# Patient Record
Sex: FEMALE | Race: OTHER | HISPANIC OR LATINO | Employment: FULL TIME | URBAN - METROPOLITAN AREA
[De-identification: names, ages, dates, MRNs, and addresses within clinical notes are randomized per-mention and may not be internally consistent; named-entity substitution may affect disease eponyms.]

---

## 2017-03-30 RX ORDER — ACETAMINOPHEN AND CODEINE PHOSPHATE 120; 12 MG/5ML; MG/5ML
1 SOLUTION ORAL DAILY
Qty: 28 TABLET | Refills: 0 | Status: SHIPPED | OUTPATIENT
Start: 2017-03-30 | End: 2017-04-19 | Stop reason: SDUPTHER

## 2017-03-30 NOTE — TELEPHONE ENCOUNTER
----- Message from Gabby Correa sent at 3/30/2017  1:46 PM CDT -----  Contact: [phone]  Patient called in requesting to speak with you. Did not specify why. Please advise.

## 2017-03-30 NOTE — TELEPHONE ENCOUNTER
Patient was originally scheduled for tomorrow morning but cycle came down. She only has one week of birth control left and would like to know if she can get one refill until she is seen 04/19 @10am.   Last GYN exam 02/15/16 (pap:WNL).  Patient was advised to make sure she keeps appt, if no shows or cancels no additional refills will be given. Patient verbalized understanding. Please advise.

## 2017-04-04 ENCOUNTER — PATIENT MESSAGE (OUTPATIENT)
Dept: OBSTETRICS AND GYNECOLOGY | Facility: CLINIC | Age: 32
End: 2017-04-04

## 2017-04-04 RX ORDER — NORETHINDRONE 0.35 MG
KIT ORAL
Qty: 28 TABLET | Refills: 11
Start: 2017-04-04

## 2017-04-05 ENCOUNTER — PATIENT MESSAGE (OUTPATIENT)
Dept: OBSTETRICS AND GYNECOLOGY | Facility: CLINIC | Age: 32
End: 2017-04-05

## 2017-04-05 NOTE — TELEPHONE ENCOUNTER
----- Message from Юлия Lake sent at 4/5/2017  2:25 PM CDT -----  [facility] called.   No. [phone]    Please call regarding Micronor.

## 2017-04-19 ENCOUNTER — OFFICE VISIT (OUTPATIENT)
Dept: OBSTETRICS AND GYNECOLOGY | Facility: CLINIC | Age: 32
End: 2017-04-19
Payer: COMMERCIAL

## 2017-04-19 VITALS
WEIGHT: 136.25 LBS | BODY MASS INDEX: 25.07 KG/M2 | HEIGHT: 62 IN | SYSTOLIC BLOOD PRESSURE: 120 MMHG | DIASTOLIC BLOOD PRESSURE: 66 MMHG

## 2017-04-19 DIAGNOSIS — Z01.419 WELL WOMAN EXAM WITH ROUTINE GYNECOLOGICAL EXAM: Primary | ICD-10-CM

## 2017-04-19 DIAGNOSIS — R10.9 FLANK PAIN: ICD-10-CM

## 2017-04-19 DIAGNOSIS — Z12.4 PAPANICOLAOU SMEAR FOR CERVICAL CANCER SCREENING: ICD-10-CM

## 2017-04-19 PROCEDURE — 99395 PREV VISIT EST AGE 18-39: CPT | Mod: S$GLB,,, | Performed by: OBSTETRICS & GYNECOLOGY

## 2017-04-19 PROCEDURE — 87086 URINE CULTURE/COLONY COUNT: CPT

## 2017-04-19 PROCEDURE — 88175 CYTOPATH C/V AUTO FLUID REDO: CPT

## 2017-04-19 PROCEDURE — 99999 PR PBB SHADOW E&M-EST. PATIENT-LVL II: CPT | Mod: PBBFAC,,, | Performed by: OBSTETRICS & GYNECOLOGY

## 2017-04-19 RX ORDER — ACETAMINOPHEN AND CODEINE PHOSPHATE 120; 12 MG/5ML; MG/5ML
1 SOLUTION ORAL DAILY
Qty: 28 TABLET | Refills: 10 | Status: SHIPPED | OUTPATIENT
Start: 2017-04-19 | End: 2017-04-22 | Stop reason: SDUPTHER

## 2017-04-19 NOTE — PROGRESS NOTES
"OBSTETRIC HISTORY:   OB History     This patient's OB History needs to be verified. Open OB History to review and resolve any issues.     Para Term  AB TAB SAB Ectopic Multiple Living    4 3 3 0 1 0 0 0 0 3         COMPREHENSIVE GYN HISTORY:  PAP History: Denies abnormal Paps.  Infection History: Denies STDs. Denies PID.  Benign History: Denies uterine fibroids. Denies ovarian cysts. Denies endometriosis.   Cancer History: Denies cervical cancer. Denies uterine cancer or hyperplasia. Denies ovarian cancer. Denies vulvar cancer or pre-cancer. Denies vaginal cancer or pre-cancer. Denies breast cancer. Denies colon cancer.  Sexual Activity History:   reports that she currently engages in sexual activity and has had male partners. She reports using the following methods of birth control/protection: None and OCP.   Menstrual History:  Every 28 days  Contraception: Mini pill      HPI:   31 y.o.  Patient's last menstrual period was 2017 (exact date).   Patient is  here for her annual gynecologic exam.  She has complaints of back pain, stomach pain, nausea and vomiting and temp 100.2. She denies bladder, breast and bowel complaints.    ROS:  GENERAL: Denies weight gain or weight loss. Feeling well overall.   SKIN: Denies rash or lesions.   HEAD: Denies headache.   NODES: Denies enlarged lymph nodes.   CHEST: Denies shortness of breath.   ABDOMEN: No  constipation, diarrhea, or rectal bleeding.   URINARY: No frequency, dysuria, hematuria, or burning on urination.  REPRODUCTIVE: See HPI.   BREASTS: The patient denies pain, lumps, or nipple discharge.   HEMATOLOGIC: No easy bruisability.   MUSCULOSKELETAL: Denies joint pain.   NEUROLOGIC: Denies weakness.   PSYCHIATRIC: Denies depression, anxiety or mood swings.    PE:   /66  Ht 5' 2" (1.575 m)  Wt 61.8 kg (136 lb 3.9 oz)  LMP 2017 (Exact Date)  BMI 24.92 kg/m2  APPEARANCE: Well nourished, well developed, in no acute distress.  NECK: Neck " symmetric without  thyromegaly.  NODES: No inguinal, cervical lymph node enlargement.  CHEST: Lungs clear to auscultation.  HEART: Regular rate and rhythm, no murmurs, rubs or gallops.  ABDOMEN: Soft. No tenderness or masses. No hernias. CVAT on the left  BREASTS: Symmetrical, no skin changes or visible lesions. No palpable masses, nipple discharge or adenopathy bilaterally.  PELVIC:   VULVA: No lesions. Normal female genitalia.  URETHRAL MEATUS: Normal size and location, no lesions, no prolapse.  URETHRA: No masses, tenderness, prolapse or scarring.  VAGINA: Moist and well rugated, no discharge, no significant cystocele or rectocele.  CERVIX: No lesions and discharge.  UTERUS: Normal size, regular shape, mobile, non-tender, bladder base nontender.  ADNEXA: No masses or tenderness.    PROCEDURES:  Pap smear  U/A: +blood    Assessment:  Normal Gynecologic Exam  Hematuria-- urine culture  Back pain-- maybe kidney stone, bladder infection or kidney infection--go to ED for any worsening   N/V-- declines anything for treatment but could be GI virus or above    Plan:  Mammogram and Colonoscopy if indicated by current recommendations.  Return to clinic in one year or for any problems or complaints.    Counseling:  Patient was counseled today on A.C.S. Pap guidelines and recommendations for yearly pelvic exams and monthly self breast exams; to see her PCP for other health maintenance. Regular exercise and healthy diet.

## 2017-04-21 LAB — BACTERIA UR CULT: NORMAL

## 2017-04-21 NOTE — TELEPHONE ENCOUNTER
----- Message from Aimee Forrest sent at 4/21/2017 10:37 AM CDT -----  Contact: Giselle Calling from [facility] - [phone]  Calling to talk to nurse concerning  norethindrone (MICRONOR) 0.35 mg tablet 28 tablet

## 2017-04-21 NOTE — TELEPHONE ENCOUNTER
Patient was recently seen 4/19/17 for annual.      Message left on secured pharmacy voice mailbox that ok to change Micronor 0.35 to 90 day with 3 additional refills.

## 2017-04-21 NOTE — TELEPHONE ENCOUNTER
----- Message from Luz Maria Durán sent at 4/21/2017 12:53 PM CDT -----  Contact: Giselle from [facility]/[phone]  Pharmacy said hey would like to see about a 90 day supply for patient  instead of 30 day supply. Please advise

## 2017-04-22 RX ORDER — ACETAMINOPHEN AND CODEINE PHOSPHATE 120; 12 MG/5ML; MG/5ML
1 SOLUTION ORAL DAILY
Qty: 84 TABLET | Refills: 3 | Status: SHIPPED | OUTPATIENT
Start: 2017-04-22 | End: 2017-06-15

## 2017-06-15 ENCOUNTER — LAB VISIT (OUTPATIENT)
Dept: LAB | Facility: HOSPITAL | Age: 32
End: 2017-06-15
Attending: OBSTETRICS & GYNECOLOGY
Payer: COMMERCIAL

## 2017-06-15 ENCOUNTER — INITIAL PRENATAL (OUTPATIENT)
Dept: OBSTETRICS AND GYNECOLOGY | Facility: CLINIC | Age: 32
End: 2017-06-15
Payer: COMMERCIAL

## 2017-06-15 VITALS
SYSTOLIC BLOOD PRESSURE: 100 MMHG | BODY MASS INDEX: 26.13 KG/M2 | WEIGHT: 142.88 LBS | DIASTOLIC BLOOD PRESSURE: 58 MMHG

## 2017-06-15 DIAGNOSIS — Z34.81 ENCOUNTER FOR SUPERVISION OF OTHER NORMAL PREGNANCY IN FIRST TRIMESTER: ICD-10-CM

## 2017-06-15 PROBLEM — Z34.91 ENCOUNTER FOR SUPERVISION OF NORMAL PREGNANCY IN FIRST TRIMESTER: Status: ACTIVE | Noted: 2017-06-15

## 2017-06-15 LAB
ABO + RH BLD: NORMAL
BLD GP AB SCN CELLS X3 SERPL QL: NORMAL
ERYTHROCYTE [DISTWIDTH] IN BLOOD BY AUTOMATED COUNT: 12.2 %
HCT VFR BLD AUTO: 35.5 %
HGB BLD-MCNC: 12.1 G/DL
MCH RBC QN AUTO: 31.8 PG
MCHC RBC AUTO-ENTMCNC: 34.1 %
MCV RBC AUTO: 93 FL
PLATELET # BLD AUTO: 265 K/UL
PMV BLD AUTO: 9.5 FL
RBC # BLD AUTO: 3.8 M/UL
WBC # BLD AUTO: 9.09 K/UL

## 2017-06-15 PROCEDURE — 87591 N.GONORRHOEAE DNA AMP PROB: CPT

## 2017-06-15 PROCEDURE — 85027 COMPLETE CBC AUTOMATED: CPT

## 2017-06-15 PROCEDURE — 86592 SYPHILIS TEST NON-TREP QUAL: CPT

## 2017-06-15 PROCEDURE — 86900 BLOOD TYPING SEROLOGIC ABO: CPT

## 2017-06-15 PROCEDURE — 86901 BLOOD TYPING SEROLOGIC RH(D): CPT

## 2017-06-15 PROCEDURE — 99999 PR PBB SHADOW E&M-EST. PATIENT-LVL II: CPT | Mod: PBBFAC,,, | Performed by: OBSTETRICS & GYNECOLOGY

## 2017-06-15 PROCEDURE — 86703 HIV-1/HIV-2 1 RESULT ANTBDY: CPT

## 2017-06-15 PROCEDURE — 87086 URINE CULTURE/COLONY COUNT: CPT

## 2017-06-15 PROCEDURE — 86762 RUBELLA ANTIBODY: CPT

## 2017-06-15 PROCEDURE — 36415 COLL VENOUS BLD VENIPUNCTURE: CPT

## 2017-06-15 PROCEDURE — 0500F INITIAL PRENATAL CARE VISIT: CPT | Mod: S$GLB,,, | Performed by: OBSTETRICS & GYNECOLOGY

## 2017-06-15 PROCEDURE — 87340 HEPATITIS B SURFACE AG IA: CPT

## 2017-06-15 NOTE — PROGRESS NOTES
OBSTETRIC HISTORY:   OB History     This patient's OB History needs to be verified. Open OB History to review and resolve any issues.     Para Term  AB Living    5 3 3 0 1 3    SAB TAB Ectopic Multiple Live Births    0 0 0 0 3             HPI:   31 y.o.  Patient's last menstrual period was 2017 (lmp unknown).   Patient is  here to confirm pregnancy and has a positive office UPT. She has some nausea.She denies bladder, breast and bowel complaints.    ROS:  GENERAL: Denies weight gain or weight loss. Feeling well overall.   SKIN: Denies rash or lesions.   HEAD: Denies headache.   NODES: Denies enlarged lymph nodes.   CHEST: Denies shortness of breath.   ABDOMEN: No abdominal pain, constipation, diarrhea, nausea, vomiting or rectal bleeding.   URINARY: No frequency, dysuria, hematuria, or burning on urination.  REPRODUCTIVE: See HPI.   BREASTS: The patient denies pain, lumps, or nipple discharge.   HEMATOLOGIC: No easy bruisability.   MUSCULOSKELETAL: Denies joint pain or back pain.   NEUROLOGIC: Denies weakness.   PSYCHIATRIC: Denies depression, anxiety or mood swings.    PE:   BP (!) 100/58   Wt 64.8 kg (142 lb 13.7 oz)   LMP 2017 (LMP Unknown)   BMI 26.13 kg/m²   APPEARANCE: Well nourished, well developed, in no acute distress.  ABDOMEN: Soft. No tenderness or masses. No hernias.  PELVIC:   VULVA: No lesions. Normal female genitalia.  URETHRAL MEATUS: Normal size and location, no lesions, no prolapse.  URETHRA: No masses, tenderness, prolapse or scarring.  VAGINA: Moist and well rugated, no discharge, no significant cystocele or rectocele.  CERVIX: No lesions and discharge.  UTERUS: Normal size, regular shape, mobile, non-tender, bladder base nontender.  ADNEXA: No masses or tenderness.    ASSESSMENT:  Pregnancy-- OB labs      PLAN:  RTO 4 weeks

## 2017-06-16 ENCOUNTER — PATIENT MESSAGE (OUTPATIENT)
Dept: OBSTETRICS AND GYNECOLOGY | Facility: CLINIC | Age: 32
End: 2017-06-16

## 2017-06-16 LAB
C TRACH DNA SPEC QL NAA+PROBE: NOT DETECTED
HBV SURFACE AG SERPL QL IA: NEGATIVE
HIV 1+2 AB+HIV1 P24 AG SERPL QL IA: NEGATIVE
N GONORRHOEA DNA SPEC QL NAA+PROBE: NOT DETECTED
RPR SER QL: NORMAL
RUBV IGG SER-ACNC: 180 IU/ML
RUBV IGG SER-IMP: REACTIVE

## 2017-06-17 LAB — BACTERIA UR CULT: NO GROWTH

## 2017-06-28 ENCOUNTER — ROUTINE PRENATAL (OUTPATIENT)
Dept: OBSTETRICS AND GYNECOLOGY | Facility: CLINIC | Age: 32
End: 2017-06-28
Payer: COMMERCIAL

## 2017-06-28 ENCOUNTER — TELEPHONE (OUTPATIENT)
Dept: OBSTETRICS AND GYNECOLOGY | Facility: CLINIC | Age: 32
End: 2017-06-28

## 2017-06-28 ENCOUNTER — OFFICE VISIT (OUTPATIENT)
Dept: OBSTETRICS AND GYNECOLOGY | Facility: CLINIC | Age: 32
End: 2017-06-28
Payer: COMMERCIAL

## 2017-06-28 VITALS
BODY MASS INDEX: 26.21 KG/M2 | SYSTOLIC BLOOD PRESSURE: 100 MMHG | DIASTOLIC BLOOD PRESSURE: 56 MMHG | WEIGHT: 143.31 LBS

## 2017-06-28 DIAGNOSIS — R30.0 DYSURIA: ICD-10-CM

## 2017-06-28 DIAGNOSIS — Z34.81 ENCOUNTER FOR SUPERVISION OF OTHER NORMAL PREGNANCY IN FIRST TRIMESTER: Primary | ICD-10-CM

## 2017-06-28 DIAGNOSIS — O20.0 THREATENED ABORTION: ICD-10-CM

## 2017-06-28 DIAGNOSIS — O20.0 THREATENED ABORTION: Primary | ICD-10-CM

## 2017-06-28 PROCEDURE — 87086 URINE CULTURE/COLONY COUNT: CPT

## 2017-06-28 PROCEDURE — 0502F SUBSEQUENT PRENATAL CARE: CPT | Mod: S$GLB,,, | Performed by: OBSTETRICS & GYNECOLOGY

## 2017-06-28 PROCEDURE — 81001 URINALYSIS AUTO W/SCOPE: CPT

## 2017-06-28 PROCEDURE — 76817 TRANSVAGINAL US OBSTETRIC: CPT | Mod: S$GLB,,, | Performed by: OBSTETRICS & GYNECOLOGY

## 2017-06-28 PROCEDURE — 99999 PR PBB SHADOW E&M-EST. PATIENT-LVL I: CPT | Mod: PBBFAC,,, | Performed by: OBSTETRICS & GYNECOLOGY

## 2017-06-28 RX ORDER — NITROFURANTOIN 25; 75 MG/1; MG/1
100 CAPSULE ORAL 2 TIMES DAILY
Qty: 14 CAPSULE | Refills: 0 | Status: SHIPPED | OUTPATIENT
Start: 2017-06-28 | End: 2017-07-05

## 2017-06-28 RX ORDER — ONDANSETRON 8 MG/1
8 TABLET, ORALLY DISINTEGRATING ORAL EVERY 8 HOURS PRN
Qty: 40 TABLET | Refills: 3 | Status: SHIPPED | OUTPATIENT
Start: 2017-06-28 | End: 2017-07-08

## 2017-06-28 NOTE — PROGRESS NOTES
Did not take temperature at home. Here it is at 99.0. She has left sided CVAT. Patient instructed bladder vs kidney infection and if starts running fever needs to be admitted to the hospital. Will start antibiotics while we wait for urine culture. Take Zofran 1 hour prior to antibiotic.

## 2017-06-28 NOTE — TELEPHONE ENCOUNTER
Patient contacted and scheduled for ultrasound at visit at 1400 6-28-17. Patient notified a clean catch urine needs to be given before her ultrasound. Patient verbalized understanding.

## 2017-06-28 NOTE — TELEPHONE ENCOUNTER
----- Message from Юлия Lake sent at 6/28/2017  9:12 AM CDT -----  No. [phone]   Patient is 8 weeks pregnant.  She has a UTI.   [facility] on [address]

## 2017-06-28 NOTE — PROGRESS NOTES
Complaining of dysuria, unable to void urine completely, left flank pain, headache, nausea, low back pain and chills

## 2017-06-28 NOTE — TELEPHONE ENCOUNTER
Returned patients call.   Patient is calling reporting L side pain and abdominal cramping, urinary urgency/frequency, urinary burning, feeling the urge to urinate but being unable to void when she attempts to, and drops of light pink/red blood present upon wiping that she believes is coming from her vagina. Patient states the symptoms began yesterday and have increased in severity today. Patient is negative for fever. Verbalized understanding. Notified the patient her message would be sent to Dr. Comer for review and the office would contact her with the physicians advisement. Patient verbalized understanding. Please advise.

## 2017-06-29 ENCOUNTER — PATIENT MESSAGE (OUTPATIENT)
Dept: OBSTETRICS AND GYNECOLOGY | Facility: CLINIC | Age: 32
End: 2017-06-29

## 2017-06-29 LAB
BACTERIA #/AREA URNS AUTO: NORMAL /HPF
BILIRUB UR QL STRIP: NEGATIVE
CLARITY UR REFRACT.AUTO: CLEAR
COLOR UR AUTO: COLORLESS
GLUCOSE UR QL STRIP: NEGATIVE
HGB UR QL STRIP: ABNORMAL
KETONES UR QL STRIP: NEGATIVE
LEUKOCYTE ESTERASE UR QL STRIP: NEGATIVE
MICROSCOPIC COMMENT: NORMAL
NITRITE UR QL STRIP: NEGATIVE
PH UR STRIP: 7 [PH] (ref 5–8)
PROT UR QL STRIP: NEGATIVE
RBC #/AREA URNS AUTO: 0 /HPF (ref 0–4)
SP GR UR STRIP: 1 (ref 1–1.03)
SQUAMOUS #/AREA URNS AUTO: 0 /HPF
URN SPEC COLLECT METH UR: ABNORMAL
UROBILINOGEN UR STRIP-ACNC: NEGATIVE EU/DL
WBC #/AREA URNS AUTO: 0 /HPF (ref 0–5)

## 2017-06-30 LAB — BACTERIA UR CULT: NORMAL

## 2017-07-13 ENCOUNTER — ROUTINE PRENATAL (OUTPATIENT)
Dept: OBSTETRICS AND GYNECOLOGY | Facility: CLINIC | Age: 32
End: 2017-07-13
Payer: COMMERCIAL

## 2017-07-13 VITALS
SYSTOLIC BLOOD PRESSURE: 110 MMHG | WEIGHT: 140.19 LBS | BODY MASS INDEX: 25.65 KG/M2 | DIASTOLIC BLOOD PRESSURE: 60 MMHG

## 2017-07-13 DIAGNOSIS — Z34.81 ENCOUNTER FOR SUPERVISION OF OTHER NORMAL PREGNANCY IN FIRST TRIMESTER: Primary | ICD-10-CM

## 2017-07-13 PROCEDURE — 99999 PR PBB SHADOW E&M-EST. PATIENT-LVL II: CPT | Mod: PBBFAC,,, | Performed by: OBSTETRICS & GYNECOLOGY

## 2017-07-13 PROCEDURE — 0502F SUBSEQUENT PRENATAL CARE: CPT | Mod: S$GLB,,, | Performed by: OBSTETRICS & GYNECOLOGY

## 2017-07-13 NOTE — PROGRESS NOTES
Complaining of constipation.  Has to take fiber gummies and stool softener then 5 hours later has a BM.  But the problem only repeats itself. Today is day two of not having a BM.    Patient wants to defer [facility]    Baby friendly ed given for weeks 9-12

## 2017-08-08 ENCOUNTER — INITIAL PRENATAL (OUTPATIENT)
Dept: OBSTETRICS AND GYNECOLOGY | Facility: CLINIC | Age: 32
End: 2017-08-08
Payer: COMMERCIAL

## 2017-08-08 ENCOUNTER — PATIENT MESSAGE (OUTPATIENT)
Dept: OBSTETRICS AND GYNECOLOGY | Facility: CLINIC | Age: 32
End: 2017-08-08

## 2017-08-08 VITALS
BODY MASS INDEX: 25.81 KG/M2 | WEIGHT: 141.13 LBS | DIASTOLIC BLOOD PRESSURE: 60 MMHG | SYSTOLIC BLOOD PRESSURE: 106 MMHG

## 2017-08-08 DIAGNOSIS — Z3A.13 13 WEEKS GESTATION OF PREGNANCY: ICD-10-CM

## 2017-08-08 DIAGNOSIS — O21.9 NAUSEA AND VOMITING DURING PREGNANCY: Primary | ICD-10-CM

## 2017-08-08 DIAGNOSIS — Z36.89 ENCOUNTER FOR FETAL ANATOMIC SURVEY: ICD-10-CM

## 2017-08-08 PROCEDURE — 99999 PR PBB SHADOW E&M-EST. PATIENT-LVL II: CPT | Mod: PBBFAC,,, | Performed by: OBSTETRICS & GYNECOLOGY

## 2017-08-08 PROCEDURE — 0502F SUBSEQUENT PRENATAL CARE: CPT | Mod: S$GLB,,, | Performed by: OBSTETRICS & GYNECOLOGY

## 2017-08-08 RX ORDER — DOXYLAMINE SUCCINATE AND PYRIDOXINE HYDROCHLORIDE, DELAYED RELEASE TABLETS 10 MG/10 MG 10; 10 MG/1; MG/1
2 TABLET, DELAYED RELEASE ORAL NIGHTLY
Qty: 120 TABLET | Refills: 1 | Status: SHIPPED | OUTPATIENT
Start: 2017-08-08 | End: 2017-09-12

## 2017-08-08 NOTE — PROGRESS NOTES
Wants to schedule C/S for 2/2/18  Has nausea.  Vomiting improving.  Will try diclegis  Declines genetic testing.

## 2017-09-12 ENCOUNTER — ROUTINE PRENATAL (OUTPATIENT)
Dept: OBSTETRICS AND GYNECOLOGY | Facility: CLINIC | Age: 32
End: 2017-09-12
Payer: COMMERCIAL

## 2017-09-12 VITALS
SYSTOLIC BLOOD PRESSURE: 106 MMHG | WEIGHT: 144.63 LBS | BODY MASS INDEX: 26.45 KG/M2 | DIASTOLIC BLOOD PRESSURE: 62 MMHG

## 2017-09-12 DIAGNOSIS — Z3A.18 18 WEEKS GESTATION OF PREGNANCY: Primary | ICD-10-CM

## 2017-09-12 DIAGNOSIS — Z34.82 ENCOUNTER FOR SUPERVISION OF OTHER NORMAL PREGNANCY, SECOND TRIMESTER: ICD-10-CM

## 2017-09-12 DIAGNOSIS — Z98.891 PREVIOUS CESAREAN SECTION: ICD-10-CM

## 2017-09-12 PROCEDURE — 0502F SUBSEQUENT PRENATAL CARE: CPT | Mod: S$GLB,,, | Performed by: OBSTETRICS & GYNECOLOGY

## 2017-09-12 PROCEDURE — 99999 PR PBB SHADOW E&M-EST. PATIENT-LVL II: CPT | Mod: PBBFAC,,, | Performed by: OBSTETRICS & GYNECOLOGY

## 2017-09-14 ENCOUNTER — OFFICE VISIT (OUTPATIENT)
Dept: MATERNAL FETAL MEDICINE | Facility: CLINIC | Age: 32
End: 2017-09-14
Attending: OBSTETRICS & GYNECOLOGY
Payer: COMMERCIAL

## 2017-09-14 DIAGNOSIS — Z36.89 ENCOUNTER FOR FETAL ANATOMIC SURVEY: ICD-10-CM

## 2017-09-14 PROCEDURE — 76805 OB US >/= 14 WKS SNGL FETUS: CPT | Mod: S$GLB,,, | Performed by: OBSTETRICS & GYNECOLOGY

## 2017-09-14 PROCEDURE — 99499 UNLISTED E&M SERVICE: CPT | Mod: S$GLB,,, | Performed by: OBSTETRICS & GYNECOLOGY

## 2017-09-28 ENCOUNTER — TELEPHONE (OUTPATIENT)
Dept: OBSTETRICS AND GYNECOLOGY | Facility: CLINIC | Age: 32
End: 2017-09-28

## 2017-09-28 ENCOUNTER — PATIENT MESSAGE (OUTPATIENT)
Dept: ADMINISTRATIVE | Facility: OTHER | Age: 32
End: 2017-09-28

## 2017-09-28 NOTE — TELEPHONE ENCOUNTER
Pt said she just went to  her connected moms packet and they told her to call the office to see if she needs to cancel her appt on 10/10. She will be 22 almost 23 weeks.

## 2017-09-28 NOTE — TELEPHONE ENCOUNTER
Advised pt that she will be 23 weeks at that visit and that she could be seen the following week instead. Informed her that the Connected Moms covers weeks 16, 20, and 35. So this week was her 20 week visit and she wouldn't have to return for 4 weeks. Pt verbalized understanding.

## 2017-09-29 ENCOUNTER — PATIENT OUTREACH (OUTPATIENT)
Dept: OTHER | Facility: OTHER | Age: 32
End: 2017-09-29

## 2017-09-29 NOTE — TELEPHONE ENCOUNTER
Called to introduce Patient to the Connected MOM program and also let her know her 20 week urine protein test is due. Was not able to leave voicemail (mailbox full), will try again Monday.

## 2017-10-04 NOTE — TELEPHONE ENCOUNTER
Initial introduction with Ms. Hillary SEGUNDO Александр completed and the role of the health coaches for Connected MOM was explained via [facility].     She is aware of the importance of taking weights and blood pressure readings.  She is aware that the health  can be used as a resource for physical activity and dietary habits.  Patient is aware to check [facility] account for messages containing information for weeks 20, 30, and 37 home urine tests.  She is aware that she should contact her OB for any specific questions regarding her pregnancy.   She is aware that she could contact [facility] on call or 911 in the case of a medical emergency.

## 2017-10-17 ENCOUNTER — ROUTINE PRENATAL (OUTPATIENT)
Dept: OBSTETRICS AND GYNECOLOGY | Facility: CLINIC | Age: 32
End: 2017-10-17
Payer: COMMERCIAL

## 2017-10-17 VITALS
BODY MASS INDEX: 28.02 KG/M2 | DIASTOLIC BLOOD PRESSURE: 62 MMHG | SYSTOLIC BLOOD PRESSURE: 118 MMHG | WEIGHT: 153.25 LBS

## 2017-10-17 DIAGNOSIS — Z3A.23 23 WEEKS GESTATION OF PREGNANCY: Primary | ICD-10-CM

## 2017-10-17 DIAGNOSIS — Z34.82 ENCOUNTER FOR SUPERVISION OF OTHER NORMAL PREGNANCY, SECOND TRIMESTER: ICD-10-CM

## 2017-10-17 PROCEDURE — 0502F SUBSEQUENT PRENATAL CARE: CPT | Mod: S$GLB,,, | Performed by: OBSTETRICS & GYNECOLOGY

## 2017-10-17 PROCEDURE — 99999 PR PBB SHADOW E&M-EST. PATIENT-LVL II: CPT | Mod: PBBFAC,,, | Performed by: OBSTETRICS & GYNECOLOGY

## 2017-11-14 ENCOUNTER — ROUTINE PRENATAL (OUTPATIENT)
Dept: OBSTETRICS AND GYNECOLOGY | Facility: CLINIC | Age: 32
End: 2017-11-14
Payer: COMMERCIAL

## 2017-11-14 ENCOUNTER — HOSPITAL ENCOUNTER (EMERGENCY)
Facility: OTHER | Age: 32
Discharge: HOME | End: 2017-11-14
Attending: OBSTETRICS & GYNECOLOGY
Payer: COMMERCIAL

## 2017-11-14 VITALS
SYSTOLIC BLOOD PRESSURE: 108 MMHG | OXYGEN SATURATION: 97 % | DIASTOLIC BLOOD PRESSURE: 59 MMHG | TEMPERATURE: 98 F | HEART RATE: 76 BPM

## 2017-11-14 VITALS
DIASTOLIC BLOOD PRESSURE: 60 MMHG | BODY MASS INDEX: 29.19 KG/M2 | WEIGHT: 159.63 LBS | SYSTOLIC BLOOD PRESSURE: 110 MMHG

## 2017-11-14 DIAGNOSIS — Z3A.27 27 WEEKS GESTATION OF PREGNANCY: ICD-10-CM

## 2017-11-14 DIAGNOSIS — O99.891 BACK PAIN AFFECTING PREGNANCY IN SECOND TRIMESTER: ICD-10-CM

## 2017-11-14 DIAGNOSIS — O23.40 UTI IN PREGNANCY, ANTEPARTUM: Primary | ICD-10-CM

## 2017-11-14 DIAGNOSIS — Z3A.27 27 WEEKS GESTATION OF PREGNANCY: Primary | ICD-10-CM

## 2017-11-14 DIAGNOSIS — O99.013 ANEMIA IN PREGNANCY, THIRD TRIMESTER: ICD-10-CM

## 2017-11-14 DIAGNOSIS — M54.9 BACK PAIN AFFECTING PREGNANCY IN SECOND TRIMESTER: ICD-10-CM

## 2017-11-14 DIAGNOSIS — Z34.83 ENCOUNTER FOR SUPERVISION OF OTHER NORMAL PREGNANCY, THIRD TRIMESTER: ICD-10-CM

## 2017-11-14 LAB
ALBUMIN SERPL BCP-MCNC: 2.9 G/DL
ALP SERPL-CCNC: 72 U/L
ALT SERPL W/O P-5'-P-CCNC: 13 U/L
ANION GAP SERPL CALC-SCNC: 7 MMOL/L
AST SERPL-CCNC: 19 U/L
BACTERIA #/AREA URNS HPF: NORMAL /HPF
BASOPHILS # BLD AUTO: 0.03 K/UL
BASOPHILS NFR BLD: 0.2 %
BILIRUB SERPL-MCNC: 0.3 MG/DL
BILIRUB UR QL STRIP: NEGATIVE
BUN SERPL-MCNC: 6 MG/DL
CALCIUM SERPL-MCNC: 9.2 MG/DL
CHLORIDE SERPL-SCNC: 106 MMOL/L
CLARITY UR: CLEAR
CO2 SERPL-SCNC: 24 MMOL/L
COLOR UR: YELLOW
CREAT SERPL-MCNC: 0.6 MG/DL
DIFFERENTIAL METHOD: ABNORMAL
EOSINOPHIL # BLD AUTO: 0.1 K/UL
EOSINOPHIL NFR BLD: 0.6 %
ERYTHROCYTE [DISTWIDTH] IN BLOOD BY AUTOMATED COUNT: 12.9 %
EST. GFR  (AFRICAN AMERICAN): >60 ML/MIN/1.73 M^2
EST. GFR  (NON AFRICAN AMERICAN): >60 ML/MIN/1.73 M^2
GLUCOSE SERPL-MCNC: 78 MG/DL
GLUCOSE UR QL STRIP: NEGATIVE
HCT VFR BLD AUTO: 31 %
HGB BLD-MCNC: 10.2 G/DL
HGB UR QL STRIP: ABNORMAL
KETONES UR QL STRIP: NEGATIVE
LEUKOCYTE ESTERASE UR QL STRIP: ABNORMAL
LYMPHOCYTES # BLD AUTO: 2 K/UL
LYMPHOCYTES NFR BLD: 16 %
MCH RBC QN AUTO: 30.9 PG
MCHC RBC AUTO-ENTMCNC: 32.9 G/DL
MCV RBC AUTO: 94 FL
MICROSCOPIC COMMENT: NORMAL
MONOCYTES # BLD AUTO: 0.9 K/UL
MONOCYTES NFR BLD: 7.1 %
NEUTROPHILS # BLD AUTO: 9.3 K/UL
NEUTROPHILS NFR BLD: 75.3 %
NITRITE UR QL STRIP: NEGATIVE
PH UR STRIP: 7 [PH] (ref 5–8)
PLATELET # BLD AUTO: 223 K/UL
PMV BLD AUTO: 10.1 FL
POTASSIUM SERPL-SCNC: 3.5 MMOL/L
PROT SERPL-MCNC: 6.9 G/DL
PROT UR QL STRIP: NEGATIVE
RBC # BLD AUTO: 3.3 M/UL
RBC #/AREA URNS HPF: 1 /HPF (ref 0–4)
SODIUM SERPL-SCNC: 137 MMOL/L
SP GR UR STRIP: <=1.005 (ref 1–1.03)
SQUAMOUS #/AREA URNS HPF: 1 /HPF
URN SPEC COLLECT METH UR: ABNORMAL
UROBILINOGEN UR STRIP-ACNC: NEGATIVE EU/DL
WBC # BLD AUTO: 12.32 K/UL
WBC #/AREA URNS HPF: 4 /HPF (ref 0–5)

## 2017-11-14 PROCEDURE — 81000 URINALYSIS NONAUTO W/SCOPE: CPT

## 2017-11-14 PROCEDURE — 99284 EMERGENCY DEPT VISIT MOD MDM: CPT | Mod: 25,,, | Performed by: OBSTETRICS & GYNECOLOGY

## 2017-11-14 PROCEDURE — 99999 PR PBB SHADOW E&M-EST. PATIENT-LVL II: CPT | Mod: PBBFAC,,, | Performed by: OBSTETRICS & GYNECOLOGY

## 2017-11-14 PROCEDURE — 80053 COMPREHEN METABOLIC PANEL: CPT

## 2017-11-14 PROCEDURE — 99284 EMERGENCY DEPT VISIT MOD MDM: CPT | Mod: 25,27

## 2017-11-14 PROCEDURE — 87077 CULTURE AEROBIC IDENTIFY: CPT

## 2017-11-14 PROCEDURE — 87086 URINE CULTURE/COLONY COUNT: CPT

## 2017-11-14 PROCEDURE — 99212 OFFICE O/P EST SF 10 MIN: CPT | Mod: PBBFAC,PN,25 | Performed by: OBSTETRICS & GYNECOLOGY

## 2017-11-14 PROCEDURE — 87088 URINE BACTERIA CULTURE: CPT

## 2017-11-14 PROCEDURE — 85025 COMPLETE CBC W/AUTO DIFF WBC: CPT

## 2017-11-14 PROCEDURE — 87186 SC STD MICRODIL/AGAR DIL: CPT

## 2017-11-14 PROCEDURE — 0502F SUBSEQUENT PRENATAL CARE: CPT | Mod: S$GLB,,, | Performed by: OBSTETRICS & GYNECOLOGY

## 2017-11-14 PROCEDURE — 59025 FETAL NON-STRESS TEST: CPT

## 2017-11-14 PROCEDURE — 59025 FETAL NON-STRESS TEST: CPT | Mod: 26,,, | Performed by: OBSTETRICS & GYNECOLOGY

## 2017-11-14 RX ORDER — NITROFURANTOIN 25; 75 MG/1; MG/1
100 CAPSULE ORAL 2 TIMES DAILY
Qty: 14 CAPSULE | Refills: 0 | Status: SHIPPED | OUTPATIENT
Start: 2017-11-14 | End: 2017-11-21

## 2017-11-14 NOTE — ED TRIAGE NOTES
Patient from clinic following abnormal urine sample and flank pain.  Patient states + kidney infections in the past

## 2017-11-14 NOTE — PROGRESS NOTES
Patient has a positive urine dip and mild left CVA tenderness  She has had kidney stones when not pregnant and pyelonephritis with and without pregnancy.  She was delivered at 37 weeks last pregnancy due to pyelonephritis.  She has no vomiting and mild nausea yesterday.  She denies fever.  To DAVID to w/u for possible pyelonephritis.  Will need 7 mth labs and Tdap at next visit

## 2017-11-14 NOTE — DISCHARGE INSTRUCTIONS
Call clinic [phone] or L & D after hours at [phone] for vaginal bleeding, leakage of fluids, contractions 4-5 in one hour, decreased fetal movements ( 10 kicks in 2 hours), headache not relieved by Tylenol, blurry vision, or temp of 100.4 or greater.  Begin doing fetal kick counts, at least 10 movements in 2 hours starting at 28 weeks gestation.  Keep next clinic appointment  Take all antibiotics as prescribed.

## 2017-11-14 NOTE — PROGRESS NOTES
Per MD, patient ok to dc home.  Patient given discharge instructions, verbalized understanding.  IV discontinued without difficulty.

## 2017-11-14 NOTE — ED PROVIDER NOTES
Encounter Date: 2017       History     Chief Complaint   Patient presents with    Urinary Tract Infection     vs pylo? sent from MD office     31 yo  @ 27  with left flank pain & nausea & possible UTI. Patient was sent from Dr Salmeron's office today for concern for pyelonephritis vs UTI vs nephrolithiasis.  Patient has a history of kidney stones; but none for a while.     She has 3  deliveries, including 1 delivery at 37 wga.  Her children at home have been sick with Upper respiratory infection.           Review of patient's allergies indicates:  No Known Allergies  Past Medical History:   Diagnosis Date    Kidney infection     kidney stones aand kidney infection 2012/and 13     Past Surgical History:   Procedure Laterality Date    BREAST SURGERY      augmentation     SECTION  2004     SECTION  2010     SECTION  2015    CHOLECYSTECTOMY      2012    DILATION AND CURETTAGE OF UTERUS      TAB     Family History   Problem Relation Age of Onset    Diabetes Maternal Grandfather     Diabetes Father     No Known Problems Mother     Diabetes Paternal Grandfather     Dementia Paternal Grandmother     No Known Problems Maternal Grandmother     No Known Problems Sister     No Known Problems Sister     Breast cancer Neg Hx     Colon cancer Neg Hx     Ovarian cancer Neg Hx     Hypertension Neg Hx     Stroke Neg Hx      Social History   Substance Use Topics    Smoking status: Never Smoker    Smokeless tobacco: Never Used    Alcohol use No     Review of Systems   Constitutional: Positive for fatigue. Negative for activity change, appetite change, chills and fever.   HENT: Negative.    Eyes: Negative.    Respiratory: Negative.    Cardiovascular: Negative.    Gastrointestinal: Positive for nausea. Negative for abdominal pain, constipation, diarrhea and vomiting.   Genitourinary: Positive for flank pain and urgency. Negative for decreased urine  volume, difficulty urinating, dyspareunia, enuresis, vaginal bleeding, vaginal discharge and vaginal pain.   Musculoskeletal: Positive for back pain. Negative for myalgias and neck pain.        Sharp shooting back pain off and on & some dull aching   Skin: Negative.    Neurological: Positive for light-headedness. Negative for dizziness, syncope and numbness.   Hematological: Negative.    Psychiatric/Behavioral: Negative.        Physical Exam     Initial Vitals [11/14/17 1233]   BP Pulse Resp Temp SpO2   (!) 108/59 76 -- 98.4 °F (36.9 °C) 97 %      MAP       75.33         Physical Exam    Vitals reviewed.  Constitutional: She appears well-developed and well-nourished. She is not diaphoretic. No distress.   HENT:   Head: Normocephalic and atraumatic.   Nose: Nose normal.   Mouth/Throat: Oropharynx is clear and moist.   Eyes: Conjunctivae are normal.   Neck: Neck supple.   Cardiovascular: Normal rate, regular rhythm, normal heart sounds and intact distal pulses.   Pulmonary/Chest: Breath sounds normal. No respiratory distress. She has no wheezes. She has no rales.   Abdominal: Soft. Bowel sounds are normal. There is tenderness. There is no rebound.   + suprapubic tenderness; mild tenderness left flank possible CVAT; no masses; no SI joint tenderness   Musculoskeletal: She exhibits no edema or tenderness.   Lymphadenopathy:     She has no cervical adenopathy.   Neurological: She is alert and oriented to person, place, and time. She has normal strength and normal reflexes.   Skin: Skin is warm and dry. Capillary refill takes less than 2 seconds.   Psychiatric: She has a normal mood and affect. Her behavior is normal. Judgment and thought content normal.         ED Course   Obtain Fetal nonstress test (NST)  Date/Time: 11/14/2017 1:41 PM  Performed by: DANIEL CARRASCO  Authorized by: KERRIE CHENG     Nonstress Test:     Variability:  6-25 BPM    Decelerations:  None    Accelerations:  10 bpm    Acoustic  Stimulator: No      Baseline:  140    Uterine Irritability: No      Contractions:  Not present  Biophysical Profile:     Nonstress Test Interpretation: appropriate for gestational age      Overall Impression:  Reassuring  Post-procedure:     Patient tolerance:  Patient tolerated the procedure well with no immediate complications      Labs Reviewed   CBC W/ AUTO DIFFERENTIAL - Abnormal; Notable for the following:        Result Value    RBC 3.30 (*)     Hemoglobin 10.2 (*)     Hematocrit 31.0 (*)     Gran # 9.3 (*)     Gran% 75.3 (*)     Lymph% 16.0 (*)     All other components within normal limits   COMPREHENSIVE METABOLIC PANEL - Abnormal; Notable for the following:     Albumin 2.9 (*)     Anion Gap 7 (*)     All other components within normal limits   CULTURE, URINE   URINALYSIS             Medical Decision Making:   History:   Old Medical Records: I decided to obtain old medical records.  Old Records Summarized: records from clinic visits and records from previous admission(s).       <> Summary of Records: Prenatal records reviewed. U/A in office +leuk & +nitrites; culture pending.  Initial Assessment:   UTI in pregnancy  Clinical Tests:   Lab Tests: Ordered and Reviewed  The following lab test(s) were unremarkable: CBC, CMP and Urinalysis       <> Summary of Lab: Stable anemia  Radiological Study: Ordered and Reviewed  ED Management:  Patient evaluated for pyelonephritis and nephrolithiasis. Findings negative except for UTI.  Cultures pending. Patient non-toxic appearing. Fetal heart rate tracing reassuring.   - Renal ultrasound negative/normal.                    ED Course      Clinical Impression:   The primary encounter diagnosis was UTI in pregnancy, antepartum. Diagnoses of 27 weeks gestation of pregnancy, Anemia in pregnancy, third trimester, and Back pain affecting pregnancy in second trimester were also pertinent to this visit.                           Uzma Gardner MD  11/14/17 5897

## 2017-11-16 LAB — BACTERIA UR CULT: NORMAL

## 2017-11-21 ENCOUNTER — ROUTINE PRENATAL (OUTPATIENT)
Dept: OBSTETRICS AND GYNECOLOGY | Facility: CLINIC | Age: 32
End: 2017-11-21
Payer: COMMERCIAL

## 2017-11-21 ENCOUNTER — CLINICAL SUPPORT (OUTPATIENT)
Dept: OBSTETRICS AND GYNECOLOGY | Facility: CLINIC | Age: 32
End: 2017-11-21
Payer: COMMERCIAL

## 2017-11-21 VITALS
BODY MASS INDEX: 29.84 KG/M2 | WEIGHT: 163.13 LBS | SYSTOLIC BLOOD PRESSURE: 120 MMHG | DIASTOLIC BLOOD PRESSURE: 70 MMHG

## 2017-11-21 DIAGNOSIS — Z34.83 ENCOUNTER FOR SUPERVISION OF OTHER NORMAL PREGNANCY, THIRD TRIMESTER: ICD-10-CM

## 2017-11-21 DIAGNOSIS — Z23 NEED FOR TDAP VACCINATION: Primary | ICD-10-CM

## 2017-11-21 DIAGNOSIS — O23.40 URINARY TRACT INFECTION IN MOTHER DURING PREGNANCY, ANTEPARTUM: ICD-10-CM

## 2017-11-21 DIAGNOSIS — Z3A.28 28 WEEKS GESTATION OF PREGNANCY: Primary | ICD-10-CM

## 2017-11-21 PROCEDURE — 99999 PR PBB SHADOW E&M-EST. PATIENT-LVL II: CPT | Mod: PBBFAC,,, | Performed by: OBSTETRICS & GYNECOLOGY

## 2017-11-21 PROCEDURE — 90472 IMMUNIZATION ADMIN EACH ADD: CPT | Mod: PBBFAC,PN

## 2017-11-21 PROCEDURE — 99212 OFFICE O/P EST SF 10 MIN: CPT | Mod: PBBFAC,27,PN,25 | Performed by: OBSTETRICS & GYNECOLOGY

## 2017-11-21 PROCEDURE — 0502F SUBSEQUENT PRENATAL CARE: CPT | Mod: S$GLB,,, | Performed by: OBSTETRICS & GYNECOLOGY

## 2017-11-21 PROCEDURE — 90471 IMMUNIZATION ADMIN: CPT | Mod: PBBFAC,PN

## 2017-11-21 PROCEDURE — 87086 URINE CULTURE/COLONY COUNT: CPT

## 2017-11-21 PROCEDURE — 99211 OFF/OP EST MAY X REQ PHY/QHP: CPT | Mod: PBBFAC,PN

## 2017-11-21 PROCEDURE — 99999 PR PBB SHADOW E&M-EST. PATIENT-LVL I: CPT | Mod: PBBFAC,,,

## 2017-11-21 PROCEDURE — 90715 TDAP VACCINE 7 YRS/> IM: CPT | Mod: PBBFAC,PN

## 2017-11-21 NOTE — PROGRESS NOTES
Educational flyer reviewed the Tdap vaccination reviewed and provided to pt. Pt received Tdap injection as ordered by Dr Salmeron to left deltoid, pt toelrated well. Pt denies pain. Pt instructed and observed for 15 min post injection. No reaction noted.

## 2017-11-21 NOTE — PROGRESS NOTES
+ucx 11/14/17 - treated with Macrobid  Repeat urine cx today. Finished meds yesterday.  No back pian.  No N/V  Tdap and flu today  Prev C/S x 3

## 2017-11-22 LAB — BACTERIA UR CULT: NO GROWTH

## 2017-11-28 ENCOUNTER — LAB VISIT (OUTPATIENT)
Dept: LAB | Facility: HOSPITAL | Age: 32
End: 2017-11-28
Attending: OBSTETRICS & GYNECOLOGY
Payer: COMMERCIAL

## 2017-11-28 DIAGNOSIS — Z3A.27 27 WEEKS GESTATION OF PREGNANCY: ICD-10-CM

## 2017-11-28 LAB
BASOPHILS # BLD AUTO: 0.06 K/UL
BASOPHILS NFR BLD: 0.5 %
DIFFERENTIAL METHOD: ABNORMAL
EOSINOPHIL # BLD AUTO: 0.1 K/UL
EOSINOPHIL NFR BLD: 0.7 %
ERYTHROCYTE [DISTWIDTH] IN BLOOD BY AUTOMATED COUNT: 13 %
GLUCOSE SERPL-MCNC: 151 MG/DL
HCT VFR BLD AUTO: 28.7 %
HGB BLD-MCNC: 9.3 G/DL
IMM GRANULOCYTES # BLD AUTO: 0.19 K/UL
IMM GRANULOCYTES NFR BLD AUTO: 1.6 %
LYMPHOCYTES # BLD AUTO: 1.6 K/UL
LYMPHOCYTES NFR BLD: 13.4 %
MCH RBC QN AUTO: 30.5 PG
MCHC RBC AUTO-ENTMCNC: 32.4 G/DL
MCV RBC AUTO: 94 FL
MONOCYTES # BLD AUTO: 0.8 K/UL
MONOCYTES NFR BLD: 6.9 %
NEUTROPHILS # BLD AUTO: 9.1 K/UL
NEUTROPHILS NFR BLD: 76.9 %
NRBC BLD-RTO: 0 /100 WBC
PLATELET # BLD AUTO: 220 K/UL
PMV BLD AUTO: 10.3 FL
RBC # BLD AUTO: 3.05 M/UL
WBC # BLD AUTO: 11.81 K/UL

## 2017-11-28 PROCEDURE — 85025 COMPLETE CBC W/AUTO DIFF WBC: CPT

## 2017-11-28 PROCEDURE — 82950 GLUCOSE TEST: CPT

## 2017-11-29 ENCOUNTER — TELEPHONE (OUTPATIENT)
Dept: OBSTETRICS AND GYNECOLOGY | Facility: CLINIC | Age: 32
End: 2017-11-29

## 2017-11-29 DIAGNOSIS — R73.09 ABNORMAL GLUCOSE TOLERANCE TEST (GTT): Primary | ICD-10-CM

## 2017-11-29 NOTE — TELEPHONE ENCOUNTER
----- Message from Tanisha Salmeron MD sent at 11/29/2017 11:58 AM CST -----  Please call pt.  She has anemia so please take fergon over the counter daily.  Also elevated 1hgtt so needs to schedule 3hgtt fasting x 8 hrs

## 2017-12-01 ENCOUNTER — PATIENT MESSAGE (OUTPATIENT)
Dept: ADMINISTRATIVE | Facility: OTHER | Age: 32
End: 2017-12-01

## 2017-12-08 ENCOUNTER — LAB VISIT (OUTPATIENT)
Dept: LAB | Facility: HOSPITAL | Age: 32
End: 2017-12-08
Attending: OBSTETRICS & GYNECOLOGY
Payer: COMMERCIAL

## 2017-12-08 DIAGNOSIS — R73.09 ABNORMAL GLUCOSE TOLERANCE TEST (GTT): ICD-10-CM

## 2017-12-08 LAB
GLUCOSE SERPL-MCNC: 125 MG/DL
GLUCOSE SERPL-MCNC: 133 MG/DL
GLUCOSE SERPL-MCNC: 90 MG/DL
GLUCOSE SERPL-MCNC: 98 MG/DL

## 2017-12-08 PROCEDURE — 82951 GLUCOSE TOLERANCE TEST (GTT): CPT

## 2017-12-15 ENCOUNTER — TELEPHONE (OUTPATIENT)
Dept: OBSTETRICS AND GYNECOLOGY | Facility: CLINIC | Age: 32
End: 2017-12-15

## 2017-12-19 ENCOUNTER — ROUTINE PRENATAL (OUTPATIENT)
Dept: OBSTETRICS AND GYNECOLOGY | Facility: CLINIC | Age: 32
End: 2017-12-19
Payer: COMMERCIAL

## 2017-12-19 ENCOUNTER — CLINICAL SUPPORT (OUTPATIENT)
Dept: OBSTETRICS AND GYNECOLOGY | Facility: CLINIC | Age: 32
End: 2017-12-19
Payer: COMMERCIAL

## 2017-12-19 VITALS
WEIGHT: 164.25 LBS | SYSTOLIC BLOOD PRESSURE: 128 MMHG | DIASTOLIC BLOOD PRESSURE: 76 MMHG | BODY MASS INDEX: 30.04 KG/M2

## 2017-12-19 DIAGNOSIS — O36.8130 DECREASED FETAL MOVEMENTS IN THIRD TRIMESTER, SINGLE OR UNSPECIFIED FETUS: Primary | ICD-10-CM

## 2017-12-19 DIAGNOSIS — Z34.83 ENCOUNTER FOR SUPERVISION OF OTHER NORMAL PREGNANCY, THIRD TRIMESTER: ICD-10-CM

## 2017-12-19 DIAGNOSIS — Z3A.32 32 WEEKS GESTATION OF PREGNANCY: Primary | ICD-10-CM

## 2017-12-19 PROCEDURE — 99999 PR PBB SHADOW E&M-EST. PATIENT-LVL II: CPT | Mod: PBBFAC,,, | Performed by: OBSTETRICS & GYNECOLOGY

## 2017-12-19 PROCEDURE — 0502F SUBSEQUENT PRENATAL CARE: CPT | Mod: S$GLB,,, | Performed by: OBSTETRICS & GYNECOLOGY

## 2017-12-19 PROCEDURE — 59025 FETAL NON-STRESS TEST: CPT | Mod: S$GLB,,, | Performed by: OBSTETRICS & GYNECOLOGY

## 2017-12-19 NOTE — PROCEDURES
Procedures    FETAL ASSESSMENT REPORT    RE: Hillary Fountain  MRN:  [id number]  :  1985  AGE:  32 y.o.    Date:  2017    Physician:  Dr. Tanisha Salmeron    Allergies: Patient has no known allergies.    Hillary is a 32 y.o.  at 32w5d gestational age here today for a NST.    2018, by Last Menstrual Period    MEDICATIONS AT TIME OF TEST:  Current Outpatient Prescriptions   Medication Sig Dispense Refill    PEDIATRIC MULTIVIT COMB #19/FA (CHILDREN'S MULTI-VIT GUMMIES ORAL) Take 1 tablet by mouth once daily.       No current facility-administered medications for this visit.        Indication: decreased fetal movement    Interpretation:  140 BPM baseline    Variability:  Good {> 6 bpm)    Accelerations:  Reactive    Decelerations:  none    Assessment: reactive    Plan:  NST reactive      Tanisha Salmeron MD  2017; 12:11 PM

## 2017-12-19 NOTE — PROGRESS NOTES
She complains of carpal tunnel on right side.  Normal  strength.  Also right sided sciatica.  Still has normal mobility. Position change and stretching recommended.  She complains of decreased fetal movement.  NST reactive

## 2018-01-02 ENCOUNTER — ROUTINE PRENATAL (OUTPATIENT)
Dept: OBSTETRICS AND GYNECOLOGY | Facility: CLINIC | Age: 33
End: 2018-01-02
Payer: COMMERCIAL

## 2018-01-02 VITALS
DIASTOLIC BLOOD PRESSURE: 82 MMHG | BODY MASS INDEX: 31.05 KG/M2 | SYSTOLIC BLOOD PRESSURE: 130 MMHG | WEIGHT: 169.75 LBS

## 2018-01-02 DIAGNOSIS — Z34.83 ENCOUNTER FOR SUPERVISION OF OTHER NORMAL PREGNANCY, THIRD TRIMESTER: ICD-10-CM

## 2018-01-02 DIAGNOSIS — Z3A.34 34 WEEKS GESTATION OF PREGNANCY: Primary | ICD-10-CM

## 2018-01-02 PROCEDURE — 0502F SUBSEQUENT PRENATAL CARE: CPT | Mod: S$GLB,,, | Performed by: OBSTETRICS & GYNECOLOGY

## 2018-01-02 PROCEDURE — 99999 PR PBB SHADOW E&M-EST. PATIENT-LVL II: CPT | Mod: PBBFAC,,, | Performed by: OBSTETRICS & GYNECOLOGY

## 2018-01-09 ENCOUNTER — ROUTINE PRENATAL (OUTPATIENT)
Dept: OBSTETRICS AND GYNECOLOGY | Facility: CLINIC | Age: 33
End: 2018-01-09
Payer: COMMERCIAL

## 2018-01-09 VITALS
DIASTOLIC BLOOD PRESSURE: 66 MMHG | BODY MASS INDEX: 30.67 KG/M2 | WEIGHT: 167.69 LBS | SYSTOLIC BLOOD PRESSURE: 114 MMHG

## 2018-01-09 DIAGNOSIS — Z34.83 ENCOUNTER FOR SUPERVISION OF OTHER NORMAL PREGNANCY, THIRD TRIMESTER: ICD-10-CM

## 2018-01-09 DIAGNOSIS — Z3A.35 35 WEEKS GESTATION OF PREGNANCY: Primary | ICD-10-CM

## 2018-01-09 PROCEDURE — 0502F SUBSEQUENT PRENATAL CARE: CPT | Mod: S$GLB,,, | Performed by: OBSTETRICS & GYNECOLOGY

## 2018-01-09 PROCEDURE — 87081 CULTURE SCREEN ONLY: CPT

## 2018-01-09 PROCEDURE — 99999 PR PBB SHADOW E&M-EST. PATIENT-LVL III: CPT | Mod: PBBFAC,,, | Performed by: OBSTETRICS & GYNECOLOGY

## 2018-01-09 RX ORDER — SODIUM CHLORIDE, SODIUM LACTATE, POTASSIUM CHLORIDE, CALCIUM CHLORIDE 600; 310; 30; 20 MG/100ML; MG/100ML; MG/100ML; MG/100ML
INJECTION, SOLUTION INTRAVENOUS CONTINUOUS
Status: CANCELLED | OUTPATIENT
Start: 2018-01-09

## 2018-01-09 RX ORDER — SODIUM CITRATE AND CITRIC ACID MONOHYDRATE 334; 500 MG/5ML; MG/5ML
30 SOLUTION ORAL
Status: CANCELLED | OUTPATIENT
Start: 2018-01-09

## 2018-01-09 RX ORDER — MISOPROSTOL 100 UG/1
800 TABLET ORAL
Status: CANCELLED | OUTPATIENT
Start: 2018-01-09

## 2018-01-09 RX ORDER — MUPIROCIN 20 MG/G
OINTMENT TOPICAL
Status: CANCELLED | OUTPATIENT
Start: 2018-01-09

## 2018-01-09 NOTE — PROGRESS NOTES
She complains of bilateral carpal tunnel.  Has brace.  Discussed elevation  and brace.  BP normal  No other complaints

## 2018-01-09 NOTE — H&P
History and Physical       Obstetrics - Planned           Subjective:       Hillary Fountain is a 32 y.o.  female with IUP at 39w0d weeks gestation on 18 who presents for scheduled  section due to previous  delivery x 3.    Patient denies contractions, denies vaginal bleeding, denies LOF.   Fetal Movement: normal.     PMHx:   Past Medical History:   Diagnosis Date    Kidney infection     kidney stones aand kidney infection 2012/and 13       PSHx:   Past Surgical History:   Procedure Laterality Date    BREAST SURGERY      augmentation     SECTION  2004     SECTION  2010     SECTION  2015    CHOLECYSTECTOMY      2012    DILATION AND CURETTAGE OF UTERUS  2005    TAB       All: Review of patient's allergies indicates:  No Known Allergies    Meds:   (Not in a hospital admission)    SH:   Social History     Social History    Marital status:      Spouse name: N/A    Number of children: N/A    Years of education: N/A     Occupational History    Not on file.     Social History Main Topics    Smoking status: Never Smoker    Smokeless tobacco: Never Used    Alcohol use No    Drug use: No    Sexual activity: Yes     Partners: Male     Birth control/ protection: None     Other Topics Concern    Not on file     Social History Narrative    No narrative on file       FH:   Family History   Problem Relation Age of Onset    Diabetes Maternal Grandfather     Diabetes Father     No Known Problems Mother     Diabetes Paternal Grandfather     Dementia Paternal Grandmother     No Known Problems Maternal Grandmother     No Known Problems Sister     No Known Problems Sister     Breast cancer Neg Hx     Colon cancer Neg Hx     Ovarian cancer Neg Hx     Hypertension Neg Hx     Stroke Neg Hx        OBHx:   Obstetric History       T3      L3     SAB0   TAB0   Ectopic0   Multiple0   Live Births3       # Outcome  Date GA Lbr Tex/2nd Weight Sex Delivery Anes PTL Lv   5 Current            4 Term 01/09/15 39w3d  3.502 kg (7 lb 11.5 oz) M CS-LTranv Spinal N FABIAN      Apgar1:  9                Apgar5: 9   3 Term 03/05/10 38w0d  2.977 kg (6 lb 9 oz) M CS-LTranv Spinal  FABIAN      Name: Dana   2 AB 2005           1 Term 08/18/04 37w0d  3.544 kg (7 lb 13 oz) M CS-LTranv EPI  FABIAN      Name: Joce      Complications: Fetal Intolerance      Obstetric Comments   Age at menarche 14       Objective:       /66   Wt 76.1 kg (167 lb 10.6 oz)   LMP 05/04/2017 (LMP Unknown)   BMI 30.67 kg/m²     [unfilled]    General:   alert, appears stated age and cooperative   Lungs:   clear to auscultation bilaterally   Heart:   regular rate and rhythm, S1, S2 normal, no murmur, click, rub or gallop   Abdomen:  soft, non-tender; bowel sounds normal; no masses,  no organomegaly   Extremities negative edema, negative erythema     Cervical exam: closed    Fetal Heart Tones: 150    Lab Review:  Blood Type A POS       1st Trimester (0-14 wks)     Test Value Date Time   ABORH - Soft  A POS  06/15/17 1545   ABSC  NEG  06/15/17 1545   HGB  12.1 g/dL 06/15/17 1545   HCT  35.5 % (L) 06/15/17 1545   MCV  93 fL 06/15/17 1545   Platelet Count  265 K/uL 06/15/17 1545   Rubella Immune Status  Reactive  06/15/17 1545   RPR  Non-reactive  06/15/17 1545   HBsAg  Negative  06/15/17 1545   HIV 1/2 Ab - Soft  Negative  06/15/17 1545   HIV 1/2 Ab      Chlamydia, Amplified  Not Detected  06/15/17 1534   GC, Amplified  Not Detected  06/15/17 1534   PAP      Hemoglobin Bands      Urine Culture  No growth  11/21/17 0927   Glucose - Random  78 mg/dL 11/14/17 1310   TSH      2nd Trimester (14-28 wks)     Test Value Date Time   ABSC      HGB  10.2 g/dL (L) 11/14/17 1310   HCT  31.0 % (L) 11/14/17 1310   MCV  94 fL 11/14/17 1310   Platelet Count  223 K/uL 11/14/17 1310   OB Glucose Screen  151 mg/dL (H) 11/28/17 1103   Gluc Fast  90 mg/dL 12/08/17 0830   Gluc 1 HR  133 mg/dL  17 0830   Gluc 2 HR  125 mg/dL 1730   Gluc 3 HR  98 mg/dL 17    3rd Trimester (28-40 wks)     Test Value Date Time   HGB  9.3 g/dL (L) 17 0959   HCT  28.7 % (L) 17 0959   MCV  94 fL 17 0959   Platelet Count  220 K/uL 17 0959   GBS      RPR      HIV 1/2 Ab - Soft      HIV 1/2 Ab      Chlamydia      GC           GBS pending  Assessment:       39w0d weeks gestation on 18 admitted for a  delivery due to previous  x 3  Declines BTL     Plan:   - Admit to Labor and Delivery unit  - Consents for delivery including vaginal or  section and blood transfusion signed and to chart  - Risks, benefits, alternatives and possible complications have been discussed in detail with the patient.     Post-Partum Hemorrhage risk - medium    Tanisha Salmeron MD

## 2018-01-11 ENCOUNTER — PATIENT MESSAGE (OUTPATIENT)
Dept: ADMINISTRATIVE | Facility: OTHER | Age: 33
End: 2018-01-11

## 2018-01-11 ENCOUNTER — TELEPHONE (OUTPATIENT)
Dept: OBSTETRICS AND GYNECOLOGY | Facility: CLINIC | Age: 33
End: 2018-01-11

## 2018-01-11 NOTE — TELEPHONE ENCOUNTER
----- Message from Tanisha Salmeron MD sent at 1/11/2018  9:24 AM CST -----  Call pt.  GBS negative

## 2018-01-12 LAB — BACTERIA SPEC AEROBE CULT: NORMAL

## 2018-01-15 ENCOUNTER — TELEPHONE (OUTPATIENT)
Dept: OBSTETRICS AND GYNECOLOGY | Facility: CLINIC | Age: 33
End: 2018-01-15

## 2018-01-15 NOTE — TELEPHONE ENCOUNTER
36 4/7 week OB c/o right flank pain and voiding.  States it doesn't feel like she is emptying bladder.  Denies pain today. Afebrile.  Scheduled tomorrow with Dr. Salmeron.  Advised if she starts having pain again or fever to go to the [facility].

## 2018-01-15 NOTE — TELEPHONE ENCOUNTER
Dr. Salmeron-- Pt is 36 wks ob and states that she may have a UTI or something going on with her kidneys. Pt states that this has happened before and she had to go to the ED for observation. Pt states that she has been experiencing right flank pain and nausea that started getting worse yesterday. Pt would like to know what she should do. Pt's # [phone]

## 2018-01-16 ENCOUNTER — ROUTINE PRENATAL (OUTPATIENT)
Dept: OBSTETRICS AND GYNECOLOGY | Facility: CLINIC | Age: 33
End: 2018-01-16
Payer: COMMERCIAL

## 2018-01-16 ENCOUNTER — LAB VISIT (OUTPATIENT)
Dept: LAB | Facility: HOSPITAL | Age: 33
End: 2018-01-16
Attending: OBSTETRICS & GYNECOLOGY
Payer: COMMERCIAL

## 2018-01-16 VITALS
DIASTOLIC BLOOD PRESSURE: 80 MMHG | WEIGHT: 167.56 LBS | BODY MASS INDEX: 30.65 KG/M2 | SYSTOLIC BLOOD PRESSURE: 122 MMHG

## 2018-01-16 DIAGNOSIS — Z34.83 ENCOUNTER FOR SUPERVISION OF OTHER NORMAL PREGNANCY, THIRD TRIMESTER: ICD-10-CM

## 2018-01-16 DIAGNOSIS — Z3A.36 36 WEEKS GESTATION OF PREGNANCY: Primary | ICD-10-CM

## 2018-01-16 DIAGNOSIS — R11.0 NAUSEA: ICD-10-CM

## 2018-01-16 DIAGNOSIS — R10.9 ACUTE RIGHT FLANK PAIN: ICD-10-CM

## 2018-01-16 DIAGNOSIS — Z3A.35 35 WEEKS GESTATION OF PREGNANCY: ICD-10-CM

## 2018-01-16 LAB
ALBUMIN SERPL BCP-MCNC: 2.7 G/DL
ALP SERPL-CCNC: 117 U/L
ALT SERPL W/O P-5'-P-CCNC: 10 U/L
ANION GAP SERPL CALC-SCNC: 7 MMOL/L
AST SERPL-CCNC: 19 U/L
BASOPHILS # BLD AUTO: 0.05 K/UL
BASOPHILS NFR BLD: 0.4 %
BILIRUB SERPL-MCNC: 0.2 MG/DL
BUN SERPL-MCNC: 9 MG/DL
CALCIUM SERPL-MCNC: 8.7 MG/DL
CHLORIDE SERPL-SCNC: 106 MMOL/L
CO2 SERPL-SCNC: 23 MMOL/L
CREAT SERPL-MCNC: 0.6 MG/DL
DIFFERENTIAL METHOD: ABNORMAL
EOSINOPHIL # BLD AUTO: 0.1 K/UL
EOSINOPHIL NFR BLD: 0.7 %
ERYTHROCYTE [DISTWIDTH] IN BLOOD BY AUTOMATED COUNT: 14.2 %
EST. GFR  (AFRICAN AMERICAN): >60 ML/MIN/1.73 M^2
EST. GFR  (NON AFRICAN AMERICAN): >60 ML/MIN/1.73 M^2
GLUCOSE SERPL-MCNC: 101 MG/DL
HCT VFR BLD AUTO: 28.4 %
HGB BLD-MCNC: 9.1 G/DL
IMM GRANULOCYTES # BLD AUTO: 0.14 K/UL
IMM GRANULOCYTES NFR BLD AUTO: 1.2 %
LYMPHOCYTES # BLD AUTO: 1.9 K/UL
LYMPHOCYTES NFR BLD: 16.5 %
MCH RBC QN AUTO: 29.2 PG
MCHC RBC AUTO-ENTMCNC: 32 G/DL
MCV RBC AUTO: 91 FL
MONOCYTES # BLD AUTO: 1.1 K/UL
MONOCYTES NFR BLD: 9.1 %
NEUTROPHILS # BLD AUTO: 8.3 K/UL
NEUTROPHILS NFR BLD: 72.1 %
NRBC BLD-RTO: 0 /100 WBC
PLATELET # BLD AUTO: 266 K/UL
PMV BLD AUTO: 10.8 FL
POTASSIUM SERPL-SCNC: 4 MMOL/L
PROT SERPL-MCNC: 6.7 G/DL
RBC # BLD AUTO: 3.12 M/UL
SODIUM SERPL-SCNC: 136 MMOL/L
WBC # BLD AUTO: 11.48 K/UL

## 2018-01-16 PROCEDURE — 99999 PR PBB SHADOW E&M-EST. PATIENT-LVL III: CPT | Mod: PBBFAC,,, | Performed by: OBSTETRICS & GYNECOLOGY

## 2018-01-16 PROCEDURE — 86703 HIV-1/HIV-2 1 RESULT ANTBDY: CPT

## 2018-01-16 PROCEDURE — 80053 COMPREHEN METABOLIC PANEL: CPT

## 2018-01-16 PROCEDURE — 85025 COMPLETE CBC W/AUTO DIFF WBC: CPT

## 2018-01-16 PROCEDURE — 87086 URINE CULTURE/COLONY COUNT: CPT

## 2018-01-16 PROCEDURE — 0502F SUBSEQUENT PRENATAL CARE: CPT | Mod: S$GLB,,, | Performed by: OBSTETRICS & GYNECOLOGY

## 2018-01-16 RX ORDER — CEPHALEXIN 500 MG/1
500 CAPSULE ORAL 4 TIMES DAILY
Qty: 28 CAPSULE | Refills: 0 | Status: SHIPPED | OUTPATIENT
Start: 2018-01-16 | End: 2018-01-21 | Stop reason: ALTCHOICE

## 2018-01-16 NOTE — PROGRESS NOTES
She has had a history of kidney infections and stones in the past.  Today she complains of nausea that started this weekend and flank pain.  No fever or vomiting.  Urine dip negative  + right CVA tenderness  CBC, CMP, Ucx  Treat with keflex 500 mg QID x 14 days just in case.  If symptoms worsen, go to [facility] and repeat imaging.

## 2018-01-17 LAB — HIV 1+2 AB+HIV1 P24 AG SERPL QL IA: NEGATIVE

## 2018-01-18 LAB
BACTERIA UR CULT: NORMAL
BACTERIA UR CULT: NORMAL

## 2018-01-21 ENCOUNTER — HOSPITAL ENCOUNTER (EMERGENCY)
Facility: OTHER | Age: 33
Discharge: HOME | End: 2018-01-21
Attending: OBSTETRICS & GYNECOLOGY
Payer: COMMERCIAL

## 2018-01-21 VITALS
SYSTOLIC BLOOD PRESSURE: 101 MMHG | TEMPERATURE: 97 F | HEART RATE: 85 BPM | RESPIRATION RATE: 16 BRPM | DIASTOLIC BLOOD PRESSURE: 60 MMHG | OXYGEN SATURATION: 99 %

## 2018-01-21 DIAGNOSIS — O99.013 ANEMIA IN PREGNANCY, THIRD TRIMESTER: ICD-10-CM

## 2018-01-21 DIAGNOSIS — Z3A.37 37 WEEKS GESTATION OF PREGNANCY: ICD-10-CM

## 2018-01-21 DIAGNOSIS — G44.209 ACUTE NON INTRACTABLE TENSION-TYPE HEADACHE: Primary | ICD-10-CM

## 2018-01-21 LAB
CREAT UR-MCNC: 26.7 MG/DL
PROT UR-MCNC: <7 MG/DL
PROT/CREAT RATIO, UR: NORMAL

## 2018-01-21 PROCEDURE — 99284 EMERGENCY DEPT VISIT MOD MDM: CPT | Mod: 25

## 2018-01-21 PROCEDURE — 25000003 PHARM REV CODE 250: Performed by: OBSTETRICS & GYNECOLOGY

## 2018-01-21 PROCEDURE — 99283 EMERGENCY DEPT VISIT LOW MDM: CPT | Mod: 25,,, | Performed by: OBSTETRICS & GYNECOLOGY

## 2018-01-21 PROCEDURE — 82570 ASSAY OF URINE CREATININE: CPT

## 2018-01-21 PROCEDURE — 59025 FETAL NON-STRESS TEST: CPT | Mod: 26,,, | Performed by: OBSTETRICS & GYNECOLOGY

## 2018-01-21 PROCEDURE — 59025 FETAL NON-STRESS TEST: CPT

## 2018-01-21 RX ORDER — BUTALBITAL, ACETAMINOPHEN AND CAFFEINE 50; 325; 40 MG/1; MG/1; MG/1
1 TABLET ORAL ONCE
Status: COMPLETED | OUTPATIENT
Start: 2018-01-21 | End: 2018-01-21

## 2018-01-21 RX ORDER — METOCLOPRAMIDE 10 MG/1
10 TABLET ORAL ONCE
Status: COMPLETED | OUTPATIENT
Start: 2018-01-21 | End: 2018-01-21

## 2018-01-21 RX ADMIN — METOCLOPRAMIDE 10 MG: 10 TABLET ORAL at 01:01

## 2018-01-21 RX ADMIN — BUTALBITAL, ACETAMINOPHEN AND CAFFEINE 1 TABLET: 50; 325; 40 TABLET ORAL at 01:01

## 2018-01-21 NOTE — ED TRIAGE NOTES
Pt presents to [facility] complaining of lower leg swelling that has increased over the last few days, mild nausea, lower back pain, blurry vision, spots in her vision, and a headache that was made slightly better by taking 350mg of Tylenol at 2345 on 1/20/18. She denies contractions, vaginal bleeding, and LOF. She states positive fetal movement. MD notified. Urine sample obtained. Will place on monitor and obtain vital signs.

## 2018-01-21 NOTE — ED PROVIDER NOTES
Encounter Date: 2018       History     Chief Complaint   Patient presents with    Blurred Vision     33 yo  @ 37 3/7 wga presents with frontal headache off and on x 1 day. Patient states she went to a wedding earlier in the day.  Then she noted a frontal headache and some blurry vision with spots, she took 1 acetaminophen. This got better; but she had then nausea. She thought she might vomit; but did not.  The headache returned, and she came in to the ED for evaluation.   The headache is frontal/parietal/bilateral. Throbbing in nature.  Her eyes feel blurry and earlier she saw some spots.  She denies numbness or tingling or weakness. She has + nausea. NO vomiting.     She notes mild swelling in her hands, feet/shins x 1 day.    She ate gumbo, etouffe & fried zuchini at a wedding earlier.      She reports + fetal movement. She denies contractions/vaginal bleeding.     Prenatal care with Dr Salmeron. Pregnancy complicated by C/S x 3 & hx of pyelonephritis. She was given Keflex for a possible UTI 5 days ago; but notified to stop taking it after her urine culture resulted.          Review of patient's allergies indicates:  No Known Allergies  Past Medical History:   Diagnosis Date    Kidney infection     kidney stones aand kidney infection 2012/and 13     Past Surgical History:   Procedure Laterality Date    BREAST SURGERY      augmentation     SECTION  2004     SECTION  2010     SECTION  2015    CHOLECYSTECTOMY      2012    DILATION AND CURETTAGE OF UTERUS      TAB     Family History   Problem Relation Age of Onset    Diabetes Maternal Grandfather     Diabetes Father     No Known Problems Mother     Diabetes Paternal Grandfather     Dementia Paternal Grandmother     No Known Problems Maternal Grandmother     No Known Problems Sister     No Known Problems Sister     Breast cancer Neg Hx     Colon cancer Neg Hx     Ovarian cancer Neg Hx      Hypertension Neg Hx     Stroke Neg Hx      Social History   Substance Use Topics    Smoking status: Never Smoker    Smokeless tobacco: Never Used    Alcohol use No     Review of Systems   Constitutional: Positive for chills. Negative for diaphoresis and fatigue.   HENT: Negative for congestion, ear pain, hearing loss, rhinorrhea, sinus pain, sinus pressure, sneezing, sore throat and tinnitus.    Eyes: Positive for visual disturbance. Negative for photophobia, discharge, redness and itching.   Respiratory: Negative.    Cardiovascular: Positive for leg swelling. Negative for chest pain and palpitations.   Gastrointestinal: Positive for nausea. Negative for constipation, diarrhea and vomiting.   Genitourinary: Negative for decreased urine volume, dysuria, frequency, pelvic pain, urgency, vaginal bleeding and vaginal discharge.   Musculoskeletal: Positive for back pain. Negative for neck pain.        1 episode of sharp back pain earlier today   Skin: Negative.    Neurological: Positive for headaches. Negative for dizziness, seizures, syncope, facial asymmetry, speech difficulty, weakness, light-headedness and numbness.   Hematological: Negative for adenopathy.       Physical Exam     Initial Vitals   BP Pulse Resp Temp SpO2   01/21/18 0039 01/21/18 0039 01/21/18 0039 01/21/18 0039 01/21/18 0041   120/62 75 16 97 °F (36.1 °C) 100 %      MAP       01/21/18 0039       81.33         Physical Exam    Vitals reviewed.  Constitutional: She appears well-developed and well-nourished. She is not diaphoretic. No distress.   HENT:   Head: Normocephalic and atraumatic.   Nose: Nose normal.   Mouth/Throat: Oropharynx is clear and moist.   Eyes: Conjunctivae and EOM are normal. Right eye exhibits no discharge. Left eye exhibits no discharge. No scleral icterus.   Neck: Neck supple. No thyromegaly present.   Cardiovascular: Normal rate, regular rhythm, normal heart sounds and intact distal pulses.   No murmur heard.  Abdominal:  Soft. Bowel sounds are normal. There is no tenderness. There is no guarding.   Gravid; size= dates   Musculoskeletal: Normal range of motion. She exhibits edema. She exhibits no tenderness.   Trace pedal edema   Lymphadenopathy:     She has no cervical adenopathy.   Neurological: She is alert and oriented to person, place, and time. She has normal strength and normal reflexes. No cranial nerve deficit.   Skin: Skin is warm and dry. Capillary refill takes less than 2 seconds.   Psychiatric: She has a normal mood and affect. Her behavior is normal. Judgment and thought content normal.         ED Course   Obtain Fetal nonstress test (NST)  Date/Time: 1/21/2018 1:03 AM  Performed by: DANIEL CARRASCO  Authorized by: DANIEL CARRASCO     Nonstress Test:     Variability:  6-25 BPM    Decelerations:  None    Accelerations:  15 bpm    Acoustic Stimulator: No      Baseline:  140    Uterine Irritability: No      Contractions:  Not present  Biophysical Profile:     Nonstress Test Interpretation: appropriate for gestational age      Overall Impression:  Reassuring  Post-procedure:     Patient tolerance:  Patient tolerated the procedure well with no immediate complications      Labs Reviewed   PROTEIN / CREATININE RATIO, URINE             Medical Decision Making:   History:   Old Medical Records: I decided to obtain old medical records.  Old Records Summarized: records from clinic visits.       <> Summary of Records: Prenatal records reviewed; anemia in pregnancy noted - stable HCT 28-29  No hx of preeclampsia or HTN  Urine culture reviewed  Initial Assessment:   Headache and nausea likely due to high sodium food/MSG  Pregnancy 37 wga  Prior CD x 3  Differential Diagnosis:   Preeclampsia  Migraine  Food poisoning  Dietary indescretion  Influenza - less likely, no body aches or fever or cough  Clinical Tests:   Lab Tests: Ordered and Reviewed  The following lab test(s) were unremarkable: Urinalysis       <> Summary of  Lab: P: C ratio  ED Management:  Patient evaluated. Fetal status reassuring. Patient well appearing.  BPs normal & urine dip clear.   Will give fioricet for headache & reglan for nausea/headache.  P:C urine ratio sent. Negative.  Anemia of pregnancy noted - patient advised to start iron tx daily to reduce chance of needing blood transfusion at delivery.   Patient did note that she had similar sx once prior to pregnancy after eating Chinese food; so possible MSG related.              Attending Attestation:   Physician Attestation Statement for Resident:  As the supervising MD   Physician Attestation Statement: I have personally seen and examined this patient.   I agree with the above history. -:   As the supervising MD I agree with the above treatment, course, plan, and disposition.  I was personally present during the critical portions of the procedure(s) performed by the resident and was immediately available in the ED to provide services and assistance as needed during the entire procedure.  I have reviewed and agree with the residents interpretation of the following: lab data.  I have reviewed the following: old records at this facility.                    ED Course      Clinical Impression:   The primary encounter diagnosis was Acute non intractable tension-type headache. Diagnoses of 37 weeks gestation of pregnancy and Anemia in pregnancy, third trimester were also pertinent to this visit.                           Uzma Gardner MD  01/25/18 0617

## 2018-01-21 NOTE — DISCHARGE INSTRUCTIONS
Stay well hydrated. OK to take acetaminophen again at 0600 on 1/21/8 if needed. Start Prenatal vitamin with iron daily for anemia.  Call clinic [phone] or L & D after hours at [phone] for vaginal bleeding, leakage of fluids, regular contractions every 5 mins for 2 hours, decreased fetal movements ( 10 kicks in 2 hours), headache not relieved by Tylenol, blurry vision, or temp of 100.4 or greater.  Begin doing fetal kick counts, at least 10 movements in 2 hours starting at 28 weeks gestation.  Keep next clinic appointment

## 2018-01-23 ENCOUNTER — ROUTINE PRENATAL (OUTPATIENT)
Dept: OBSTETRICS AND GYNECOLOGY | Facility: CLINIC | Age: 33
End: 2018-01-23
Payer: COMMERCIAL

## 2018-01-23 VITALS
SYSTOLIC BLOOD PRESSURE: 120 MMHG | BODY MASS INDEX: 31.45 KG/M2 | WEIGHT: 171.94 LBS | DIASTOLIC BLOOD PRESSURE: 68 MMHG

## 2018-01-23 DIAGNOSIS — Z34.83 ENCOUNTER FOR SUPERVISION OF OTHER NORMAL PREGNANCY, THIRD TRIMESTER: ICD-10-CM

## 2018-01-23 DIAGNOSIS — Z3A.37 37 WEEKS GESTATION OF PREGNANCY: Primary | ICD-10-CM

## 2018-01-23 PROCEDURE — 99999 PR PBB SHADOW E&M-EST. PATIENT-LVL II: CPT | Mod: PBBFAC,,, | Performed by: OBSTETRICS & GYNECOLOGY

## 2018-01-23 PROCEDURE — 0502F SUBSEQUENT PRENATAL CARE: CPT | Mod: S$GLB,,, | Performed by: OBSTETRICS & GYNECOLOGY

## 2018-01-23 NOTE — PROGRESS NOTES
No contractions.  Complains of mild carpal tunnel in right hand and sciatica.  Discussed elevating hand and that after delivery edema will resolve.    Labor precautions

## 2018-01-26 ENCOUNTER — TELEPHONE (OUTPATIENT)
Dept: OBSTETRICS AND GYNECOLOGY | Facility: CLINIC | Age: 33
End: 2018-01-26

## 2018-01-26 DIAGNOSIS — Z98.891 PREVIOUS CESAREAN SECTION: Primary | ICD-10-CM

## 2018-01-31 ENCOUNTER — TELEPHONE (OUTPATIENT)
Dept: OBSTETRICS AND GYNECOLOGY | Facility: CLINIC | Age: 33
End: 2018-01-31

## 2018-01-31 NOTE — TELEPHONE ENCOUNTER
Pt thinks she had one contraction at 11:22 but has not had another one since.  Recommended she hydrate and monitor.  Advised if she has more than 5 contractions in 1 hour to go to the [facility] since she is a repeat c/s.

## 2018-01-31 NOTE — TELEPHONE ENCOUNTER
Dr. Salmeron ob patient- going in tomorrow to have baby and just now felt a contraction, what should she do??

## 2018-02-01 ENCOUNTER — SURGERY (OUTPATIENT)
Age: 33
End: 2018-02-01

## 2018-02-01 ENCOUNTER — ANESTHESIA EVENT (OUTPATIENT)
Dept: OBSTETRICS AND GYNECOLOGY | Facility: OTHER | Age: 33
End: 2018-02-01
Payer: COMMERCIAL

## 2018-02-01 ENCOUNTER — HOSPITAL ENCOUNTER (INPATIENT)
Facility: OTHER | Age: 33
LOS: 3 days | Discharge: HOME | End: 2018-02-04
Attending: OBSTETRICS & GYNECOLOGY | Admitting: OBSTETRICS & GYNECOLOGY
Payer: COMMERCIAL

## 2018-02-01 ENCOUNTER — ANESTHESIA (OUTPATIENT)
Dept: OBSTETRICS AND GYNECOLOGY | Facility: OTHER | Age: 33
End: 2018-02-01
Payer: COMMERCIAL

## 2018-02-01 DIAGNOSIS — Z34.83 ENCOUNTER FOR SUPERVISION OF OTHER NORMAL PREGNANCY, THIRD TRIMESTER: ICD-10-CM

## 2018-02-01 DIAGNOSIS — Z3A.35 35 WEEKS GESTATION OF PREGNANCY: ICD-10-CM

## 2018-02-01 LAB
ABO + RH BLD: NORMAL
BASOPHILS # BLD AUTO: 0.04 K/UL
BASOPHILS NFR BLD: 0.4 %
BLD GP AB SCN CELLS X3 SERPL QL: NORMAL
DIFFERENTIAL METHOD: ABNORMAL
EOSINOPHIL # BLD AUTO: 0.1 K/UL
EOSINOPHIL NFR BLD: 0.5 %
ERYTHROCYTE [DISTWIDTH] IN BLOOD BY AUTOMATED COUNT: 15.5 %
HCT VFR BLD AUTO: 31.1 %
HGB BLD-MCNC: 9.8 G/DL
LYMPHOCYTES # BLD AUTO: 2 K/UL
LYMPHOCYTES NFR BLD: 19.6 %
MCH RBC QN AUTO: 28.9 PG
MCHC RBC AUTO-ENTMCNC: 31.5 G/DL
MCV RBC AUTO: 92 FL
MONOCYTES # BLD AUTO: 1 K/UL
MONOCYTES NFR BLD: 9.4 %
NEUTROPHILS # BLD AUTO: 7.1 K/UL
NEUTROPHILS NFR BLD: 68.7 %
PLATELET # BLD AUTO: 241 K/UL
PMV BLD AUTO: 10.2 FL
RBC # BLD AUTO: 3.39 M/UL
RPR SER QL: NORMAL
WBC # BLD AUTO: 10.4 K/UL

## 2018-02-01 PROCEDURE — 25000003 PHARM REV CODE 250: Performed by: OBSTETRICS & GYNECOLOGY

## 2018-02-01 PROCEDURE — 59510 CESAREAN DELIVERY: CPT | Mod: GB,,, | Performed by: OBSTETRICS & GYNECOLOGY

## 2018-02-01 PROCEDURE — 37000008 HC ANESTHESIA 1ST 15 MINUTES: Performed by: OBSTETRICS & GYNECOLOGY

## 2018-02-01 PROCEDURE — 25000003 PHARM REV CODE 250: Performed by: STUDENT IN AN ORGANIZED HEALTH CARE EDUCATION/TRAINING PROGRAM

## 2018-02-01 PROCEDURE — 86592 SYPHILIS TEST NON-TREP QUAL: CPT

## 2018-02-01 PROCEDURE — S0020 INJECTION, BUPIVICAINE HYDRO: HCPCS | Performed by: ANESTHESIOLOGY

## 2018-02-01 PROCEDURE — 59514 CESAREAN DELIVERY ONLY: CPT | Mod: 82,GB,, | Performed by: STUDENT IN AN ORGANIZED HEALTH CARE EDUCATION/TRAINING PROGRAM

## 2018-02-01 PROCEDURE — 11000001 HC ACUTE MED/SURG PRIVATE ROOM

## 2018-02-01 PROCEDURE — 63600175 PHARM REV CODE 636 W HCPCS: Performed by: STUDENT IN AN ORGANIZED HEALTH CARE EDUCATION/TRAINING PROGRAM

## 2018-02-01 PROCEDURE — 86850 RBC ANTIBODY SCREEN: CPT

## 2018-02-01 PROCEDURE — 25000003 PHARM REV CODE 250: Performed by: ANESTHESIOLOGY

## 2018-02-01 PROCEDURE — 59510 CESAREAN DELIVERY: CPT | Mod: ,,, | Performed by: ANESTHESIOLOGY

## 2018-02-01 PROCEDURE — 37000009 HC ANESTHESIA EA ADD 15 MINS: Performed by: OBSTETRICS & GYNECOLOGY

## 2018-02-01 PROCEDURE — 51702 INSERT TEMP BLADDER CATH: CPT

## 2018-02-01 PROCEDURE — 36000685 HC CESAREAN SECTION LEVEL I

## 2018-02-01 PROCEDURE — S0028 INJECTION, FAMOTIDINE, 20 MG: HCPCS | Performed by: OBSTETRICS & GYNECOLOGY

## 2018-02-01 PROCEDURE — 36415 COLL VENOUS BLD VENIPUNCTURE: CPT

## 2018-02-01 PROCEDURE — 85025 COMPLETE CBC W/AUTO DIFF WBC: CPT

## 2018-02-01 PROCEDURE — 63600175 PHARM REV CODE 636 W HCPCS: Performed by: ANESTHESIOLOGY

## 2018-02-01 RX ORDER — KETOROLAC TROMETHAMINE 30 MG/ML
INJECTION, SOLUTION INTRAMUSCULAR; INTRAVENOUS
Status: DISCONTINUED | OUTPATIENT
Start: 2018-02-01 | End: 2018-02-01

## 2018-02-01 RX ORDER — ONDANSETRON 8 MG/1
8 TABLET, ORALLY DISINTEGRATING ORAL EVERY 8 HOURS PRN
Status: DISCONTINUED | OUTPATIENT
Start: 2018-02-01 | End: 2018-02-04 | Stop reason: HOSPADM

## 2018-02-01 RX ORDER — SODIUM CHLORIDE, SODIUM LACTATE, POTASSIUM CHLORIDE, CALCIUM CHLORIDE 600; 310; 30; 20 MG/100ML; MG/100ML; MG/100ML; MG/100ML
INJECTION, SOLUTION INTRAVENOUS CONTINUOUS
Status: ACTIVE | OUTPATIENT
Start: 2018-02-01 | End: 2018-02-01

## 2018-02-01 RX ORDER — ACETAMINOPHEN 325 MG/1
650 TABLET ORAL EVERY 6 HOURS
Status: DISCONTINUED | OUTPATIENT
Start: 2018-02-01 | End: 2018-02-01

## 2018-02-01 RX ORDER — SIMETHICONE 80 MG
1 TABLET,CHEWABLE ORAL EVERY 6 HOURS PRN
Status: DISCONTINUED | OUTPATIENT
Start: 2018-02-01 | End: 2018-02-04 | Stop reason: HOSPADM

## 2018-02-01 RX ORDER — OXYTOCIN/RINGER'S LACTATE 20/1000 ML
41.65 PLASTIC BAG, INJECTION (ML) INTRAVENOUS CONTINUOUS
Status: DISCONTINUED | OUTPATIENT
Start: 2018-02-01 | End: 2018-02-01

## 2018-02-01 RX ORDER — AMOXICILLIN 250 MG
1 CAPSULE ORAL NIGHTLY PRN
Status: DISCONTINUED | OUTPATIENT
Start: 2018-02-01 | End: 2018-02-04 | Stop reason: HOSPADM

## 2018-02-01 RX ORDER — OXYCODONE HYDROCHLORIDE 5 MG/1
10 TABLET ORAL EVERY 4 HOURS PRN
Status: ACTIVE | OUTPATIENT
Start: 2018-02-01 | End: 2018-02-02

## 2018-02-01 RX ORDER — MUPIROCIN 20 MG/G
OINTMENT TOPICAL
Status: DISCONTINUED | OUTPATIENT
Start: 2018-02-01 | End: 2018-02-01

## 2018-02-01 RX ORDER — MUPIROCIN 20 MG/G
1 OINTMENT TOPICAL 2 TIMES DAILY
Status: DISCONTINUED | OUTPATIENT
Start: 2018-02-01 | End: 2018-02-04 | Stop reason: HOSPADM

## 2018-02-01 RX ORDER — BUPIVACAINE HYDROCHLORIDE 7.5 MG/ML
INJECTION, SOLUTION EPIDURAL; RETROBULBAR
Status: DISCONTINUED | OUTPATIENT
Start: 2018-02-01 | End: 2018-02-01

## 2018-02-01 RX ORDER — PHENYLEPHRINE HYDROCHLORIDE 10 MG/ML
INJECTION INTRAVENOUS
Status: DISCONTINUED | OUTPATIENT
Start: 2018-02-01 | End: 2018-02-01

## 2018-02-01 RX ORDER — MISOPROSTOL 200 UG/1
800 TABLET ORAL
Status: DISCONTINUED | OUTPATIENT
Start: 2018-02-01 | End: 2018-02-01

## 2018-02-01 RX ORDER — DIPHENHYDRAMINE HCL 25 MG
25 CAPSULE ORAL EVERY 4 HOURS PRN
Status: DISCONTINUED | OUTPATIENT
Start: 2018-02-01 | End: 2018-02-04 | Stop reason: HOSPADM

## 2018-02-01 RX ORDER — IBUPROFEN 600 MG/1
600 TABLET ORAL EVERY 6 HOURS
Status: DISCONTINUED | OUTPATIENT
Start: 2018-02-02 | End: 2018-02-04 | Stop reason: HOSPADM

## 2018-02-01 RX ORDER — MORPHINE SULFATE 0.5 MG/ML
INJECTION, SOLUTION EPIDURAL; INTRATHECAL; INTRAVENOUS
Status: DISCONTINUED | OUTPATIENT
Start: 2018-02-01 | End: 2018-02-01

## 2018-02-01 RX ORDER — CARBOPROST TROMETHAMINE 250 UG/ML
INJECTION, SOLUTION INTRAMUSCULAR
Status: DISCONTINUED
Start: 2018-02-01 | End: 2018-02-01 | Stop reason: WASHOUT

## 2018-02-01 RX ORDER — ACETAMINOPHEN 10 MG/ML
INJECTION, SOLUTION INTRAVENOUS
Status: DISCONTINUED | OUTPATIENT
Start: 2018-02-01 | End: 2018-02-01

## 2018-02-01 RX ORDER — FAMOTIDINE 10 MG/ML
20 INJECTION INTRAVENOUS
Status: COMPLETED | OUTPATIENT
Start: 2018-02-01 | End: 2018-02-01

## 2018-02-01 RX ORDER — OXYTOCIN 10 [USP'U]/ML
INJECTION, SOLUTION INTRAMUSCULAR; INTRAVENOUS
Status: DISCONTINUED | OUTPATIENT
Start: 2018-02-01 | End: 2018-02-01

## 2018-02-01 RX ORDER — BUTALBITAL, ACETAMINOPHEN AND CAFFEINE 50; 325; 40 MG/1; MG/1; MG/1
1 TABLET ORAL EVERY 4 HOURS
Status: DISCONTINUED | OUTPATIENT
Start: 2018-02-01 | End: 2018-02-04 | Stop reason: HOSPADM

## 2018-02-01 RX ORDER — CEFAZOLIN SODIUM 2 G/50ML
2 SOLUTION INTRAVENOUS
Status: DISCONTINUED | OUTPATIENT
Start: 2018-02-01 | End: 2018-02-01

## 2018-02-01 RX ORDER — MORPHINE SULFATE 0.5 MG/ML
INJECTION, SOLUTION EPIDURAL; INTRATHECAL; INTRAVENOUS
Status: DISPENSED
Start: 2018-02-01 | End: 2018-02-01

## 2018-02-01 RX ORDER — SODIUM CITRATE AND CITRIC ACID MONOHYDRATE 334; 500 MG/5ML; MG/5ML
30 SOLUTION ORAL
Status: DISCONTINUED | OUTPATIENT
Start: 2018-02-01 | End: 2018-02-01

## 2018-02-01 RX ORDER — OXYCODONE AND ACETAMINOPHEN 10; 325 MG/1; MG/1
1 TABLET ORAL EVERY 4 HOURS PRN
Status: DISCONTINUED | OUTPATIENT
Start: 2018-02-02 | End: 2018-02-04 | Stop reason: HOSPADM

## 2018-02-01 RX ORDER — SODIUM CHLORIDE, SODIUM LACTATE, POTASSIUM CHLORIDE, CALCIUM CHLORIDE 600; 310; 30; 20 MG/100ML; MG/100ML; MG/100ML; MG/100ML
INJECTION, SOLUTION INTRAVENOUS CONTINUOUS
Status: ACTIVE | OUTPATIENT
Start: 2018-02-02 | End: 2018-02-02

## 2018-02-01 RX ORDER — OXYCODONE HYDROCHLORIDE 5 MG/1
5 TABLET ORAL EVERY 4 HOURS PRN
Status: ACTIVE | OUTPATIENT
Start: 2018-02-01 | End: 2018-02-02

## 2018-02-01 RX ORDER — KETOROLAC TROMETHAMINE 30 MG/ML
30 INJECTION, SOLUTION INTRAMUSCULAR; INTRAVENOUS EVERY 6 HOURS
Status: DISPENSED | OUTPATIENT
Start: 2018-02-01 | End: 2018-02-02

## 2018-02-01 RX ORDER — METHYLERGONOVINE MALEATE 0.2 MG/ML
INJECTION INTRAVENOUS
Status: DISCONTINUED
Start: 2018-02-01 | End: 2018-02-01 | Stop reason: WASHOUT

## 2018-02-01 RX ORDER — BISACODYL 10 MG
10 SUPPOSITORY, RECTAL RECTAL ONCE AS NEEDED
Status: ACTIVE | OUTPATIENT
Start: 2018-02-01 | End: 2018-02-01

## 2018-02-01 RX ORDER — ONDANSETRON 2 MG/ML
4 INJECTION INTRAMUSCULAR; INTRAVENOUS EVERY 6 HOURS PRN
Status: DISCONTINUED | OUTPATIENT
Start: 2018-02-01 | End: 2018-02-04 | Stop reason: HOSPADM

## 2018-02-01 RX ORDER — SODIUM CHLORIDE, SODIUM LACTATE, POTASSIUM CHLORIDE, CALCIUM CHLORIDE 600; 310; 30; 20 MG/100ML; MG/100ML; MG/100ML; MG/100ML
INJECTION, SOLUTION INTRAVENOUS CONTINUOUS
Status: DISCONTINUED | OUTPATIENT
Start: 2018-02-01 | End: 2018-02-01

## 2018-02-01 RX ORDER — ONDANSETRON HYDROCHLORIDE 2 MG/ML
INJECTION, SOLUTION INTRAMUSCULAR; INTRAVENOUS
Status: DISCONTINUED | OUTPATIENT
Start: 2018-02-01 | End: 2018-02-01

## 2018-02-01 RX ORDER — HYDROCORTISONE 25 MG/G
CREAM TOPICAL 3 TIMES DAILY PRN
Status: DISCONTINUED | OUTPATIENT
Start: 2018-02-01 | End: 2018-02-04 | Stop reason: HOSPADM

## 2018-02-01 RX ORDER — LIDOCAINE HYDROCHLORIDE AND EPINEPHRINE 15; 5 MG/ML; UG/ML
INJECTION, SOLUTION EPIDURAL
Status: DISCONTINUED | OUTPATIENT
Start: 2018-02-01 | End: 2018-02-01

## 2018-02-01 RX ORDER — OXYCODONE AND ACETAMINOPHEN 5; 325 MG/1; MG/1
1 TABLET ORAL EVERY 4 HOURS PRN
Status: DISCONTINUED | OUTPATIENT
Start: 2018-02-02 | End: 2018-02-04 | Stop reason: HOSPADM

## 2018-02-01 RX ORDER — DOCUSATE SODIUM 100 MG/1
200 CAPSULE, LIQUID FILLED ORAL 2 TIMES DAILY
Status: DISCONTINUED | OUTPATIENT
Start: 2018-02-01 | End: 2018-02-04 | Stop reason: HOSPADM

## 2018-02-01 RX ORDER — ADHESIVE BANDAGE
30 BANDAGE TOPICAL 2 TIMES DAILY PRN
Status: DISCONTINUED | OUTPATIENT
Start: 2018-02-02 | End: 2018-02-04 | Stop reason: HOSPADM

## 2018-02-01 RX ORDER — FENTANYL CITRATE 50 UG/ML
INJECTION, SOLUTION INTRAMUSCULAR; INTRAVENOUS
Status: COMPLETED
Start: 2018-02-01 | End: 2018-02-01

## 2018-02-01 RX ORDER — FENTANYL CITRATE 50 UG/ML
INJECTION, SOLUTION INTRAMUSCULAR; INTRAVENOUS
Status: DISCONTINUED | OUTPATIENT
Start: 2018-02-01 | End: 2018-02-01

## 2018-02-01 RX ADMIN — BUPIVACAINE HYDROCHLORIDE 1.6 ML: 7.5 INJECTION, SOLUTION EPIDURAL; RETROBULBAR at 07:02

## 2018-02-01 RX ADMIN — KETOROLAC TROMETHAMINE 30 MG: 30 INJECTION, SOLUTION INTRAMUSCULAR at 07:02

## 2018-02-01 RX ADMIN — DEXTROSE 2 G: 50 INJECTION, SOLUTION INTRAVENOUS at 07:02

## 2018-02-01 RX ADMIN — SODIUM CHLORIDE, SODIUM LACTATE, POTASSIUM CHLORIDE, AND CALCIUM CHLORIDE: .6; .31; .03; .02 INJECTION, SOLUTION INTRAVENOUS at 11:02

## 2018-02-01 RX ADMIN — BUTALBITAL, ACETAMINOPHEN AND CAFFEINE 1 TABLET: 50; 325; 40 TABLET ORAL at 11:02

## 2018-02-01 RX ADMIN — ONDANSETRON 4 MG: 2 INJECTION, SOLUTION INTRAMUSCULAR; INTRAVENOUS at 07:02

## 2018-02-01 RX ADMIN — DOCUSATE SODIUM 200 MG: 100 CAPSULE, LIQUID FILLED ORAL at 08:02

## 2018-02-01 RX ADMIN — PHENYLEPHRINE HYDROCHLORIDE 200 MCG: 10 INJECTION INTRAVENOUS at 07:02

## 2018-02-01 RX ADMIN — KETOROLAC TROMETHAMINE 30 MG: 30 INJECTION, SOLUTION INTRAMUSCULAR; INTRAVENOUS at 08:02

## 2018-02-01 RX ADMIN — PHENYLEPHRINE HYDROCHLORIDE 100 MCG: 10 INJECTION INTRAVENOUS at 07:02

## 2018-02-01 RX ADMIN — SODIUM CITRATE AND CITRIC ACID MONOHYDRATE 30 ML: 500; 334 SOLUTION ORAL at 06:02

## 2018-02-01 RX ADMIN — ACETAMINOPHEN 650 MG: 325 TABLET ORAL at 07:02

## 2018-02-01 RX ADMIN — SODIUM CHLORIDE, SODIUM LACTATE, POTASSIUM CHLORIDE, AND CALCIUM CHLORIDE: 600; 310; 30; 20 INJECTION, SOLUTION INTRAVENOUS at 07:02

## 2018-02-01 RX ADMIN — ACETAMINOPHEN 1000 MG: 10 INJECTION, SOLUTION INTRAVENOUS at 07:02

## 2018-02-01 RX ADMIN — FAMOTIDINE 20 MG: 10 INJECTION, SOLUTION INTRAVENOUS at 06:02

## 2018-02-01 RX ADMIN — OXYTOCIN 2 UNITS: 10 INJECTION, SOLUTION INTRAMUSCULAR; INTRAVENOUS at 07:02

## 2018-02-01 RX ADMIN — OXYTOCIN 2 UNITS: 10 INJECTION, SOLUTION INTRAMUSCULAR; INTRAVENOUS at 08:02

## 2018-02-01 RX ADMIN — Medication 0.15 MG: at 07:02

## 2018-02-01 RX ADMIN — LIDOCAINE HYDROCHLORIDE,EPINEPHRINE BITARTRATE 3 ML: 15; .005 INJECTION, SOLUTION EPIDURAL; INFILTRATION; INTRACAUDAL; PERINEURAL at 07:02

## 2018-02-01 RX ADMIN — FENTANYL CITRATE 10 MCG: 50 INJECTION, SOLUTION INTRAMUSCULAR; INTRAVENOUS at 07:02

## 2018-02-01 RX ADMIN — ONDANSETRON 8 MG: 8 TABLET, ORALLY DISINTEGRATING ORAL at 11:02

## 2018-02-01 NOTE — DISCHARGE INSTRUCTIONS
Breastfeeding Discharge Instructions       Feed the baby at the earliest sign of hunger or comfort  o Hands to mouth, sucking motions  o Rooting or searching for something to suck on  o Dont wait for crying - it is a late sign of hunger and comfort.     The feedings may be 8-12 times per 24hrs and will not follow a schedule   Avoid pacifiers and bottles for the first 4 weeks   Alternate the breast you start the feeding with, or start with the breast that feels the fullest   Switch breasts when the baby takes himself off the breast or falls asleep   Keep offering breasts until the baby looks full, no longer gives hunger signs, and stays asleep when placed on his back in the crib   If the baby is sleepy and wont wake for a feeding, put the baby skin-to-skin dressed in a diaper against the mothers bare chest   Sleep near your baby   The baby should be positioned and latched on to the breast correctly  o Chest-to-chest, chin in the breast  o Babys lips are flipped outward  o Babys mouth is stretched open wide like a shout  o Babys sucking should feel like tugging to the mother  - The baby should be drinking at the breast:  o You should hear swallowing or gulping throughout the feeding  o You should see milk on the babys lips when he comes off the breast  o Your breasts should be softer when the baby is finished feeding  o The baby should look relaxed at the end of feedings  o After the 4th day and your milk is in:  o The babys poop should turn bright yellow and be loose, watery, and seedy  o The baby should have at least 3-4 poops the size of the palm of your hand per day  o The baby should have at least 6-8 wet diapers per day  o The urine should be light yellow in color  You should drink when you are thirsty and eat a healthy diet when you are    hungry.     Take naps to get the rest you need.   Take medications and/or drink alcohol only with permission of your obstetrician    or the babys  pediatrician.  You can also call the [facility] Risk [facility],   ([phone]), Monday-Friday, 8am-5pm Central time, to get the most   up-to-date evidence-based information on the use of medications during   pregnancy and breastfeeding.      The baby should be examined by a pediatrician at 3-5 days of age.  Once your   milk comes in, the baby should be gaining at least ½ - 1oz each day and should be back to birthweight no later than 10-14 days of age.          Community Resources    [facility] Breastfeeding Warmline: [phone]   Local [facility] clinics: provide incentives and breastpumps to eligible mothers  La Leche League International (LLLI):  mother-to-mother support group website        www.[employer].Virtual 3-D Display for Smartphones  Local La Leche League mother-to-mother support groups:        www.MXP4        La Leche League [facility]   Dr. Norberto Whatley website for latch videos and general information:        www.breastfeedinginc.ca  [facility] is a call center that provides information about the safety of taking medications while breastfeeding.  Call 6-748-876-2135, M-F, 8am-5pm, CT.  International Lactation Consultant Association provides resources for assistance:        www.ilca.org  [facility] Breastfeeding Coalition provides informationand resources for parents  and the community    http://louisianabreastfeeding.org     Meghan Estevez is a mom-to-mom support group:                             www.[employer]com//breastfeedng-support/  [employer] for Healthy Babies:  3-396-674-BABY(0215)  [employer]: a breastfeeding support group for women of color, 851.688.3417

## 2018-02-01 NOTE — ANESTHESIA PREPROCEDURE EVALUATION
2018  Hillary Fountain is a 32 y.o., female.    Hillary Fountain is a 32 y.o. female  @ 39w0d     OB History    Para Term  AB Living   5 3 3 0 1 3   SAB TAB Ectopic Multiple Live Births   0 0 0 0 3      # Outcome Date GA Lbr Tex/2nd Weight Sex Delivery Anes PTL Lv   5 Current            4 Term 01/09/15 39w3d  3.502 kg (7 lb 11.5 oz) M CS-LTranv Spinal N FABIAN   3 Term 03/05/10 38w0d  2.977 kg (6 lb 9 oz) M CS-LTranv Spinal  FABIAN   2 AB 2005           1 Term 04 37w0d  3.544 kg (7 lb 13 oz) M CS-LTranv EPI  FABIAN      Complications: Fetal Intolerance      Obstetric Comments   Age at menarche 14       Wt Readings from Last 1 Encounters:   18 0837 78 kg (171 lb 15.3 oz)       BP Readings from Last 3 Encounters:   18 120/68   18 101/60   18 122/80       Patient Active Problem List   Diagnosis    Encounter for supervision of normal pregnancy in first trimester    Previous  section    35 weeks gestation of pregnancy       Past Surgical History:   Procedure Laterality Date    BREAST SURGERY      augmentation     SECTION  2004     SECTION  2010     SECTION  2015    CHOLECYSTECTOMY      2012    DILATION AND CURETTAGE OF UTERUS  2005    TAB       Social History     Social History    Marital status:      Spouse name: N/A    Number of children: N/A    Years of education: N/A     Occupational History    Not on file.     Social History Main Topics    Smoking status: Never Smoker    Smokeless tobacco: Never Used    Alcohol use No    Drug use: No    Sexual activity: Yes     Partners: Male     Birth control/ protection: None     Other Topics Concern    Not on file     Social History Narrative    No narrative on file         Chemistry        Component Value Date/Time     2018 1511    K 4.0  01/16/2018 1511     01/16/2018 1511    CO2 23 01/16/2018 1511    BUN 9 01/16/2018 1511    CREATININE 0.6 01/16/2018 1511     01/16/2018 1511        Component Value Date/Time    CALCIUM 8.7 01/16/2018 1511    ALKPHOS 117 01/16/2018 1511    AST 19 01/16/2018 1511    ALT 10 01/16/2018 1511    BILITOT 0.2 01/16/2018 1511    ESTGFRAFRICA >60.0 01/16/2018 1511    EGFRNONAA >60.0 01/16/2018 1511            Lab Results   Component Value Date    WBC 11.48 01/16/2018    HGB 9.1 (L) 01/16/2018    HCT 28.4 (L) 01/16/2018    MCV 91 01/16/2018     01/16/2018       No results for input(s): PT, INR, PROTIME, APTT in the last 72 hours.      Anesthesia Evaluation         Review of Systems         Anesthesia Plan  Type of Anesthesia, risks & benefits discussed:  Anesthesia Type:  CSE  Patient's Preference:   Intra-op Monitoring Plan: standard ASA monitors  Intra-op Monitoring Plan Comments:   Post Op Pain Control Plan:   Post Op Pain Control Plan Comments:   Induction:    Beta Blocker:  Patient is not currently on a Beta-Blocker (No further documentation required).       Informed Consent: Patient understands risks and agrees with Anesthesia plan.  Questions answered. Anesthesia consent signed with patient.  ASA Score: 2     Day of Surgery Review of History & Physical:            Ready For Surgery From Anesthesia Perspective.

## 2018-02-01 NOTE — TRANSFER OF CARE
"Anesthesia Transfer of Care Note    Patient: Hillary Fountain    Procedure(s) Performed: Procedure(s) (LRB):  DELIVERY- SECTION (N/A)    Patient location: PACU    Anesthesia Type: spinal    Transport from OR: Transported from OR on room air with adequate spontaneous ventilation    Post pain: adequate analgesia    Post assessment: no apparent anesthetic complications    Post vital signs: stable    Level of consciousness: awake, alert and oriented    Nausea/Vomiting: no nausea/vomiting    Complications: none    Transfer of care protocol was followed      Last vitals:   Visit Vitals  /65   Pulse 75   Resp 16   Ht 5' 2" (1.575 m)   Wt 76.7 kg (169 lb)   LMP 2017 (LMP Unknown)   SpO2 100%   Breastfeeding? No   BMI 30.91 kg/m²     "

## 2018-02-01 NOTE — H&P
H&P completed on 18 has been reviewed, the patient has been examined and:  I concur with the findings and no changes have occurred since H&P was written.    Active Hospital Problems    Diagnosis  POA    * delivery delivered [O82]  Unknown    35 weeks gestation of pregnancy [Z3A.35]  Not Applicable      Resolved Hospital Problems    Diagnosis Date Resolved POA   No resolved problems to display.

## 2018-02-01 NOTE — OP NOTE
DATE OF PROCEDURE:  2018    PREOPERATIVE DIAGNOSES:  1.  A 32-year-old G5, P3-0-1-3 at 39 weeks.  2.  Previous  section x3.    POSTOPERATIVE DIAGNOSES:  1.  A 32-year-old G5, P3-0-1-3 at 39-weeks.  2.  Previous  section x3.    PROCEDURE:  Repeat low transverse  section via Pfannenstiel skin   incision with scar revision.    SURGEON:  Tanisha Salmeron M.D.    ASSISTANT:  Kirsten Tai MD.  No qualified resident available.    COMPLICATIONS:  None.    ANESTHESIA:  Spinal.    ESTIMATED BLOOD LOSS:  500 mL.    URINE OUTPUT AND INTRAVENOUS FLUIDS:  Per Anesthesia sheet.    INDICATIONS:  Mrs. Fountain is a 32-year-old G5, P3-0-1-3 at 39 weeks with history of 3   previous  deliveries, risks and benefits of repeat  delivery   explained including bleeding, infection, injury to any internal organ or vessel,   possible hysterectomy, thromboembolism and death.  The patient understood the   risks and benefits and wished to proceed.    PROCEDURE IN DETAIL:  After adequate anesthesia was obtained, the patient was   prepped and draped in usual sterile fashion.  The incision was made to previous   skin incision site and that scar was removed.  The fascia was then incised in   both directions using curved Mayos and the rectus muscles dissected off   superiorly and inferiorly using the curved Mayos.  The rectus muscles were   scarred in the midline, so I used the scalpel to make an incision superficially   midline until I could see the peritoneum.  At this point, the peritoneum was   entered into bluntly using the surgeon's fingers and extended through traction.    The bladder blade was inserted.  A transverse incision was made in the lower   uterine segment above the level of bladder.  The amniotic sac was entered into   using the surgeon's finger, bluntly and clear fluid was noted and the incision   was extended to traction.  The infant was delivered without difficulty followed   by  the rest of the infant's body.  The cord was clamped and cut, 2 g Ancef was   given prior to making the initial skin incision and the mouth and nose were   suctioned with the bulb prior to handing off to pediatric team.  At this point,   the placenta was manually extracted.  The uterus then exteriorized and cleared   of all clots and debris using a moist laparotomy sponge.  The uterine incision   was repaired in a running locked fashion using a 0 chromic and an imbricating   layer 0 chromic was placed.  I then irrigated behind the uterus to remove all   clots and debris and placed uterus back into the abdomen, there was no bleeding   at the incision site.  I then irrigated both gutters with 60 mL of normal   saline.  We then reapproximated the peritoneum and rectus muscle in midline   using 2-0 Vicryl in a running fashion.  All bleeding vessels were then   cauterized on the rectus muscles.  The fascia was reapproximated in running   fashion using #1 Vicryl, subcutaneous tissue was irrigated copiously and all   bleeding vessels were cauterized and 2-0 plain gut was used to reapproximate   this layer.  The skin was then reapproximated using 4-0 Monocryl in a   subcuticular manner.  Pressure dressing was applied.  Sponge, lap, needle counts   correct x2.  The patient returned to Recovery Room in stable condition.    FINDINGS:  Viable female infant in a vertex presentation, weight 7 pounds 9.7   ounces, Apgars 9 and 9.            /ls [id number] review        KS/REESE  dd: 02/01/2018 13:04:11 (CST)  td: 02/01/2018 19:33:07 (CST)  Doc ID   #[id number]  Job ID #[id number]    CC:

## 2018-02-01 NOTE — LACTATION NOTE
Seen pt for lactation counseling.  Latch assist done, encouraged breast compression to transfer more milk to baby.  Discussed basics of breastfeeding.  Encouraged skin to skin during feeding.  Observed feeding session.  number on the board.      02/01/18 5288   Maternal Medical Surgical History   Surgical History yes   Surgical Procedure breast augmentation   Maternal Infant Assessment   Breast Shape pendulous   Breast Density soft   Areola elastic   Nipple(s) everted   Infant Assessment   Sucking Reflex present   Rooting Reflex present   Swallow Reflex present   LATCH Score   Latch 1-->repeated attempts, holds nipple in mouth, stimulate to suck   Audible Swallowing 1-->a few with stimulation   Type Of Nipple 2-->everted (after stimulation)   Comfort (Breast/Nipple) 2-->soft/nontender   Hold (Positioning) 1-->minimal assist, teach one side: mother does other, staff holds   Score (less than 7 for 2/more consecutive times, consult Lactation Consultant) 7       Number Scale   Presence of Pain denies   Maternal Infant Feeding   Maternal Emotional State assist needed   Signs of Milk Transfer audible swallow   Time Spent (min) 15-30 min   Latch Assistance yes   Breastfeeding Education adequate infant intake;adequate milk volume;importance of skin-to-skin contact;milk expression, hand   Feeding Infant   Effective Latch During Feeding yes   Audible Swallow yes   Skin-to-Skin Contact During Feeding yes   Lactation Referrals   Lactation Consult Breastfeeding assessment;Initial assessment   Lactation Interventions   Attachment Promotion skin-to-skin contact encouraged   Breastfeeding Assistance assisted with positioning;support offered;feeding session observed   Maternal Breastfeeding Support lactation counseling provided;encouragement offered   Latch Promotion suck stimulated with colostrum drop

## 2018-02-01 NOTE — ANESTHESIA PROCEDURE NOTES
CSE    Patient location during procedure: OR  Start time: 2/1/2018 7:17 AM  Timeout: 2/1/2018 7:16 AM  Staffing  Anesthesiologist: COBY LANGE  Resident/CRNA: BUD PATRICIO  Performed: anesthesiologist and resident/CRNA   Preanesthetic Checklist  Completed: patient identified, site marked, surgical consent, pre-op evaluation, timeout performed, IV checked, risks and benefits discussed and monitors and equipment checked  CSE  Patient position: sitting  Prep: ChloraPrep  Patient monitoring: heart rate, continuous pulse ox and frequent blood pressure checks  Approach: midline  Spinal Needle  Needle type: pencil-tip   Needle gauge: 25 G  Needle length: 3.5 in  Epidural Needle  Injection technique: RODRIGUEZ saline  Needle type: Tuohy   Needle gauge: 17 G  Needle length: 3.5 in  Needle insertion depth: 5 cm  Catheter  Catheter type: springwound  Catheter size: 19 G  Catheter at skin depth: 9 cm  Assessment  Intrathecal Medications:  Bolus administered: 1.6 mL of 0.75 bupivacaine  administered: primary anesthetic and 10 mcg of  fentanyl  Additional Notes  Accidental dural puncture on 1st attempt

## 2018-02-02 LAB
BASOPHILS # BLD AUTO: 0.03 K/UL
BASOPHILS NFR BLD: 0.3 %
DIFFERENTIAL METHOD: ABNORMAL
EOSINOPHIL # BLD AUTO: 0.1 K/UL
EOSINOPHIL NFR BLD: 0.6 %
ERYTHROCYTE [DISTWIDTH] IN BLOOD BY AUTOMATED COUNT: 15.7 %
HCT VFR BLD AUTO: 27.1 %
HGB BLD-MCNC: 8.4 G/DL
LYMPHOCYTES # BLD AUTO: 1.1 K/UL
LYMPHOCYTES NFR BLD: 10.8 %
MCH RBC QN AUTO: 28.6 PG
MCHC RBC AUTO-ENTMCNC: 31 G/DL
MCV RBC AUTO: 92 FL
MONOCYTES # BLD AUTO: 0.9 K/UL
MONOCYTES NFR BLD: 8.9 %
NEUTROPHILS # BLD AUTO: 8.1 K/UL
NEUTROPHILS NFR BLD: 78.5 %
PLATELET # BLD AUTO: 208 K/UL
PMV BLD AUTO: 9.8 FL
RBC # BLD AUTO: 2.94 M/UL
WBC # BLD AUTO: 10.35 K/UL

## 2018-02-02 PROCEDURE — 85025 COMPLETE CBC W/AUTO DIFF WBC: CPT

## 2018-02-02 PROCEDURE — 11000001 HC ACUTE MED/SURG PRIVATE ROOM

## 2018-02-02 PROCEDURE — 36415 COLL VENOUS BLD VENIPUNCTURE: CPT

## 2018-02-02 PROCEDURE — 99024 POSTOP FOLLOW-UP VISIT: CPT | Mod: ,,, | Performed by: OBSTETRICS & GYNECOLOGY

## 2018-02-02 PROCEDURE — 25000003 PHARM REV CODE 250: Performed by: OBSTETRICS & GYNECOLOGY

## 2018-02-02 PROCEDURE — 63600175 PHARM REV CODE 636 W HCPCS: Performed by: STUDENT IN AN ORGANIZED HEALTH CARE EDUCATION/TRAINING PROGRAM

## 2018-02-02 PROCEDURE — 25000003 PHARM REV CODE 250: Performed by: STUDENT IN AN ORGANIZED HEALTH CARE EDUCATION/TRAINING PROGRAM

## 2018-02-02 RX ADMIN — OXYCODONE HYDROCHLORIDE AND ACETAMINOPHEN 1 TABLET: 10; 325 TABLET ORAL at 05:02

## 2018-02-02 RX ADMIN — KETOROLAC TROMETHAMINE 30 MG: 30 INJECTION, SOLUTION INTRAMUSCULAR at 02:02

## 2018-02-02 RX ADMIN — BUTALBITAL, ACETAMINOPHEN AND CAFFEINE 1 TABLET: 50; 325; 40 TABLET ORAL at 05:02

## 2018-02-02 RX ADMIN — DOCUSATE SODIUM 200 MG: 100 CAPSULE, LIQUID FILLED ORAL at 08:02

## 2018-02-02 RX ADMIN — BUTALBITAL, ACETAMINOPHEN AND CAFFEINE 1 TABLET: 50; 325; 40 TABLET ORAL at 09:02

## 2018-02-02 RX ADMIN — BUTALBITAL, ACETAMINOPHEN AND CAFFEINE 1 TABLET: 50; 325; 40 TABLET ORAL at 03:02

## 2018-02-02 RX ADMIN — BUTALBITAL, ACETAMINOPHEN AND CAFFEINE 1 TABLET: 50; 325; 40 TABLET ORAL at 08:02

## 2018-02-02 RX ADMIN — IBUPROFEN 600 MG: 600 TABLET, FILM COATED ORAL at 04:02

## 2018-02-02 RX ADMIN — IBUPROFEN 600 MG: 600 TABLET, FILM COATED ORAL at 09:02

## 2018-02-02 NOTE — ANESTHESIA POSTPROCEDURE EVALUATION
"Anesthesia Post Evaluation    Patient: Hillary Fountain    Procedure(s) Performed: Procedure(s) (LRB):  DELIVERY- SECTION (N/A)    Final Anesthesia Type: CSE  Patient location during evaluation: floor  Patient participation: Yes- Able to Participate  Level of consciousness: awake and alert and oriented  Post-procedure vital signs: reviewed and stable  Pain management: adequate  Airway patency: patent  PONV status at discharge: No PONV  Anesthetic complications: yes  Perioperative Events: post-dural puncture headache    Cardiovascular status: blood pressure returned to baseline and hemodynamically stable  Respiratory status: unassisted, room air and spontaneous ventilation  Hydration status: euvolemic  Follow-up needed (Continue follow up for PDPH. Currently doing well after receiving continous IVF and Fiorcet scheduled. Will continue with this regimen and re-assess tomorrow.)         Visit Vitals  BP (!) 101/51 (BP Location: Right arm, Patient Position: Standing)   Pulse 83   Temp 36.9 °C (98.4 °F) (Oral)   Resp 18   Ht 5' 2" (1.575 m)   Wt 76.7 kg (169 lb)   LMP 2017 (LMP Unknown)   SpO2 100%   Breastfeeding? Yes   BMI 30.91 kg/m²       Pain/Apple Score: Pain Rating Prior to Med Admin: 2 (2018  8:39 AM)  Pain Rating Post Med Admin: 0 (2018  2:31 PM)      "

## 2018-02-02 NOTE — ASSESSMENT & PLAN NOTE
Continue routine care  Anesthesia monitoring headache - mild -but could be Spinal due to difficulty getting Spinal anesthesia.

## 2018-02-02 NOTE — LACTATION NOTE
"This note was copied from a baby's chart.     02/02/18 1200   Maternal Infant Assessment   Breast Density soft   Areola elastic   Nipple(s) everted   Nipple Symptoms tender   LATCH Score   Latch 2-->grasps breast, tongue down, lips flanged, rhythmic sucking   Audible Swallowing 1-->a few with stimulation   Type Of Nipple 2-->everted (after stimulation)   Comfort (Breast/Nipple) 2-->soft/nontender   Hold (Positioning) 1-->minimal assist, teach one side: mother does other, staff holds   Score (less than 7 for 2/more consecutive times, consult Lactation Consultant) 8   Maternal Infant Feeding   Maternal Emotional State assist needed   Infant Positioning clutch/"football";cross-cradle   Time Spent (min) 15-30 min   Milk Ejection Reflex present   Latch Assistance yes   Breastfeeding Education milk expression, hand    Following Delivery yes   Lactation Referrals   Lactation Consult Breastfeeding assessment;Follow up;Knowledge deficit   Lactation Interventions   Attachment Promotion breastfeeding assistance provided;face-to-face positioning promoted;skin-to-skin contact encouraged;rooming-in promoted   Breastfeeding Assistance assisted with positioning;infant latch-on verified;infant suck/swallow verified;support offered;feeding session observed   Maternal Breastfeeding Support lactation counseling provided   LC asst latching baby in cross chest. Baby nursing with swallows.  "

## 2018-02-02 NOTE — SUBJECTIVE & OBJECTIVE
Hospital course: HD#1 -  delivery  - no complications.   POD#1 - patient doing well; but mild mid-line headache possibly due to CSE - anesthesia treating with Fioricet and PO fluids.     Interval History: POD#1    She is doing well this morning. She is tolerating a regular diet without nausea or vomiting. She is voiding spontaneously. She is ambulating. She has passed flatus, and has not a BM. Vaginal bleeding is mild. She denies fever or chills. Abdominal pain is moderate and controlled with oral medications. She is breastfeeding. Headache midline , radiates from back to front, improved with fioricet.  No N&V.    Objective:     Vital Signs (Most Recent):  Temp: 98 °F (36.7 °C) (18)  Pulse: 83 (18)  Resp: 18 (18)  BP: (!) 104/58 (18)  SpO2: 98 % (18) Vital Signs (24h Range):  Temp:  [97.6 °F (36.4 °C)-98.2 °F (36.8 °C)] 98 °F (36.7 °C)  Pulse:  [55-83] 83  Resp:  [18-20] 18  SpO2:  [96 %-100 %] 98 %  BP: ()/(50-62) 104/58     Weight: 76.7 kg (169 lb)  Body mass index is 30.91 kg/m².      Intake/Output Summary (Last 24 hours) at 18 09  Last data filed at 18 0234   Gross per 24 hour   Intake                0 ml   Output             3150 ml   Net            -3150 ml       Significant Labs:  Lab Results   Component Value Date    GROUPTRH A POS 2018    HEPBSAG Negative 06/15/2017    STREPBCULT No Group B Streptococcus isolated 2018    AFP 0.69 MoM (21.7 ng/mL) 2009       Recent Labs  Lab 18  0410   HGB 8.4*   HCT 27.1*       CBC:   Recent Labs  Lab 18  0410   WBC 10.35   RBC 2.94*   HGB 8.4*   HCT 27.1*      MCV 92   MCH 28.6   MCHC 31.0*     I have personallly reviewed all pertinent lab results from the last 24 hours.    Physical Exam:   Constitutional: She is oriented to person, place, and time. She appears well-developed and well-nourished. No distress.    HENT:   Head: Normocephalic and atraumatic.    Nose: Nose normal.    Eyes: Conjunctivae are normal.    Neck: Neck supple.    Cardiovascular: Normal rate, regular rhythm, normal heart sounds and intact distal pulses.  Exam reveals edema.     Pulmonary/Chest: Effort normal and breath sounds normal. She has no wheezes.        Abdominal: Soft. Bowel sounds are normal. She exhibits abdominal incision. There is tenderness. There is no guarding.   Incision: bandaged, clean & dry  Uterus firm, non-tender and below the umbilicus               Musculoskeletal: Moves all extremeties. She exhibits edema. She exhibits no tenderness.       Neurological: She is alert and oriented to person, place, and time.    Skin: Skin is warm and dry. No rash noted. She is not diaphoretic.    Psychiatric: She has a normal mood and affect. Her behavior is normal. Judgment and thought content normal.

## 2018-02-02 NOTE — HOSPITAL COURSE
HD#1 -  delivery  - no complications.   POD#1 - patient doing well; but mild mid-line headache possibly due to CSE - anesthesia treating with Fioricet and PO fluids.   POD#2 - patient is without complaints, HA resolved, pain controlled.    POD#3 - Ms Fountain is resting, no major complaints.  HA controlled with fioricet

## 2018-02-02 NOTE — ASSESSMENT & PLAN NOTE
showing mild anemia due to intrapartum blood loss. The patient is asymptomatic of the anemia - no intervention is indicated

## 2018-02-02 NOTE — PROGRESS NOTES
[facility]  Obstetrics  Postpartum Progress Note    Patient Name: Hillary Fountain  MRN: [id number]  Admission Date: 2018  Hospital Length of Stay: 1 days  Attending Physician: Tanisha Salmeron MD  Primary Care Provider: Primary Doctor No    Subjective:     Principal Problem: delivery delivered    Hospital course: HD#1 -  delivery  - no complications.   POD#1 - patient doing well; but mild mid-line headache possibly due to CSE - anesthesia treating with Fioricet and PO fluids.     Interval History: POD#1    She is doing well this morning. She is tolerating a regular diet without nausea or vomiting. She is voiding spontaneously. She is ambulating. She has passed flatus, and has not a BM. Vaginal bleeding is mild. She denies fever or chills. Abdominal pain is moderate and controlled with oral medications. She is breastfeeding. Headache midline , radiates from back to front, improved with fioricet.  No N&V.    Objective:     Vital Signs (Most Recent):  Temp: 98 °F (36.7 °C) (18)  Pulse: 83 (18)  Resp: 18 (18)  BP: (!) 104/58 (18)  SpO2: 98 % (18) Vital Signs (24h Range):  Temp:  [97.6 °F (36.4 °C)-98.2 °F (36.8 °C)] 98 °F (36.7 °C)  Pulse:  [55-83] 83  Resp:  [18-20] 18  SpO2:  [96 %-100 %] 98 %  BP: ()/(50-62) 104/58     Weight: 76.7 kg (169 lb)  Body mass index is 30.91 kg/m².      Intake/Output Summary (Last 24 hours) at 18  Last data filed at 18 0234   Gross per 24 hour   Intake                0 ml   Output             3150 ml   Net            -3150 ml       Significant Labs:  Lab Results   Component Value Date    GROUPTRH A POS 2018    HEPBSAG Negative 06/15/2017    STREPBCULT No Group B Streptococcus isolated 2018    AFP 0.69 MoM (21.7 ng/mL) 2009       Recent Labs  Lab 18  0410   HGB 8.4*   HCT 27.1*       CBC:   Recent Labs  Lab 18   WBC 10.35   RBC 2.94*   HGB  8.4*   HCT 27.1*      MCV 92   MCH 28.6   MCHC 31.0*     I have personallly reviewed all pertinent lab results from the last 24 hours.    Physical Exam:   Constitutional: She is oriented to person, place, and time. She appears well-developed and well-nourished. No distress.    HENT:   Head: Normocephalic and atraumatic.   Nose: Nose normal.    Eyes: Conjunctivae are normal.    Neck: Neck supple.    Cardiovascular: Normal rate, regular rhythm, normal heart sounds and intact distal pulses.  Exam reveals edema.     Pulmonary/Chest: Effort normal and breath sounds normal. She has no wheezes.        Abdominal: Soft. Bowel sounds are normal. She exhibits abdominal incision. There is tenderness. There is no guarding.   Incision: bandaged, clean & dry  Uterus firm, non-tender and below the umbilicus               Musculoskeletal: Moves all extremeties. She exhibits edema. She exhibits no tenderness.       Neurological: She is alert and oriented to person, place, and time.    Skin: Skin is warm and dry. No rash noted. She is not diaphoretic.    Psychiatric: She has a normal mood and affect. Her behavior is normal. Judgment and thought content normal.       Assessment/Plan:     32 y.o. female  for:    *  delivery delivered    Continue routine care  Anesthesia monitoring headache - mild -but could be Spinal due to difficulty getting Spinal anesthesia.        Anemia in pregnancy, delivered, current hospitalization    showing mild anemia due to intrapartum blood loss. The patient is asymptomatic of the anemia - no intervention is indicated              Disposition: As patient meets milestones, will plan to discharge POD3.    Uzma Gardner MD  Obstetrics  [facility]

## 2018-02-02 NOTE — PROGRESS NOTES
Pt c/o severe headache at this time. Blood pressure 110/55. Notified on call anesthesia Dr. Meng to inform him of patient's status. M.D. rounded on patient and discussed plan of care. Orders received to hang continuous IV fluids on patient and scheduled fioricet to be administered. Will encourage pt to lay flat when not breastfeeding and to drink plenty of fluids as recommended by M.D. Will continue to monitor patient throughout the night and update M.BETTY. If h/a continues.

## 2018-02-02 NOTE — PROGRESS NOTES
Nursing called ~2230 about the pt's c/o HA. At bedside, pt states her HA was initially frontal, but has since worsened and encompasses her entire head. No relief from tylenol or toradol. HA is constant and positional in nature. No other deficits noted. Discussed with pt that she is likely experiencing a PDPH and how it occurred and what to expect. Advised pt to lay as flat as is comfortable and drink lots of fluids. Ordered IVF cont @ 125ml/h and Fiorecets q4 scheduled. Will cont to monitor.    D/W OB team and staff.    Taqueria Lainez, CA-2

## 2018-02-03 PROCEDURE — 25000003 PHARM REV CODE 250: Performed by: STUDENT IN AN ORGANIZED HEALTH CARE EDUCATION/TRAINING PROGRAM

## 2018-02-03 PROCEDURE — 11000001 HC ACUTE MED/SURG PRIVATE ROOM

## 2018-02-03 PROCEDURE — 99024 POSTOP FOLLOW-UP VISIT: CPT | Mod: ,,, | Performed by: OBSTETRICS & GYNECOLOGY

## 2018-02-03 PROCEDURE — 25000003 PHARM REV CODE 250: Performed by: OBSTETRICS & GYNECOLOGY

## 2018-02-03 RX ADMIN — BUTALBITAL, ACETAMINOPHEN AND CAFFEINE 1 TABLET: 50; 325; 40 TABLET ORAL at 01:02

## 2018-02-03 RX ADMIN — BUTALBITAL, ACETAMINOPHEN AND CAFFEINE 1 TABLET: 50; 325; 40 TABLET ORAL at 05:02

## 2018-02-03 RX ADMIN — DOCUSATE SODIUM 200 MG: 100 CAPSULE, LIQUID FILLED ORAL at 08:02

## 2018-02-03 RX ADMIN — BUTALBITAL, ACETAMINOPHEN AND CAFFEINE 1 TABLET: 50; 325; 40 TABLET ORAL at 06:02

## 2018-02-03 RX ADMIN — IBUPROFEN 600 MG: 600 TABLET, FILM COATED ORAL at 05:02

## 2018-02-03 RX ADMIN — IBUPROFEN 600 MG: 600 TABLET, FILM COATED ORAL at 06:02

## 2018-02-03 RX ADMIN — IBUPROFEN 600 MG: 600 TABLET, FILM COATED ORAL at 11:02

## 2018-02-03 RX ADMIN — OXYCODONE HYDROCHLORIDE AND ACETAMINOPHEN 1 TABLET: 5; 325 TABLET ORAL at 04:02

## 2018-02-03 RX ADMIN — BUTALBITAL, ACETAMINOPHEN AND CAFFEINE 1 TABLET: 50; 325; 40 TABLET ORAL at 10:02

## 2018-02-03 RX ADMIN — BUTALBITAL, ACETAMINOPHEN AND CAFFEINE 1 TABLET: 50; 325; 40 TABLET ORAL at 02:02

## 2018-02-03 RX ADMIN — DOCUSATE SODIUM 200 MG: 100 CAPSULE, LIQUID FILLED ORAL at 10:02

## 2018-02-03 NOTE — PROGRESS NOTES
[facility]  Obstetrics  Postpartum Progress Note    Patient Name: Hillary Fountain  MRN: [id number]  Admission Date: 2018  Hospital Length of Stay: 2 days  Attending Physician: Tanisha Salmeron MD  Primary Care Provider: Primary Doctor No    Subjective:     Principal Problem: delivery delivered    Hospital course: HD#1 -  delivery  - no complications.   POD#1 - patient doing well; but mild mid-line headache possibly due to CSE - anesthesia treating with Fioricet and PO fluids.   POD#2 - patient is without complaints, HA resolved, pain controlled.      She is doing well this morning. She is tolerating a regular diet without nausea or vomiting. She is voiding spontaneously. She is ambulating. She has passed flatus, and has not a BM. Vaginal bleeding is mild. She denies fever or chills. Abdominal pain is mild and controlled with oral medications. She is breastfeeding.     Objective:     Vital Signs (Most Recent):  Temp: 98.7 °F (37.1 °C) (18)  Pulse: 74 (18)  Resp: 18 (18)  BP: (!) 96/51 (18)  SpO2: 97 % (18) Vital Signs (24h Range):  Temp:  [98 °F (36.7 °C)-98.7 °F (37.1 °C)] 98.7 °F (37.1 °C)  Pulse:  [70-83] 74  Resp:  [17-18] 18  SpO2:  [97 %-100 %] 97 %  BP: ()/(51-60) 96/51     Weight: 76.7 kg (169 lb)  Body mass index is 30.91 kg/m².      Intake/Output Summary (Last 24 hours) at 18 0825  Last data filed at 18 1000   Gross per 24 hour   Intake                0 ml   Output             1200 ml   Net            -1200 ml       Significant Labs:  Lab Results   Component Value Date    GROUPTRH A POS 2018    HEPBSAG Negative 06/15/2017    STREPBCULT No Group B Streptococcus isolated 2018    AFP 0.69 MoM (21.7 ng/mL) 2009       Recent Labs  Lab 18  0410   HGB 8.4*   HCT 27.1*       I have personallly reviewed all pertinent lab results from the last 24 hours.    Physical Exam:    Constitutional: She is oriented to person, place, and time. She appears well-developed and well-nourished. No distress.       Cardiovascular: Normal rate.     Pulmonary/Chest: No respiratory distress.        Abdominal: Soft. She exhibits abdominal incision. She exhibits no distension. There is no tenderness. There is no rebound and no guarding.   Incision healing well without drainage or erythema     Genitourinary:   Genitourinary Comments: Fundus firm, Nt, below umbilicus           Musculoskeletal: She exhibits no edema or tenderness.       Neurological: She is alert and oriented to person, place, and time.    Skin: Skin is warm and dry.    Psychiatric: She has a normal mood and affect.       Assessment/Plan:     32 y.o. female  for:    *  delivery delivered    Continue routine care  Anesthesia monitoring headache - mild -but could be Spinal due to difficulty getting Spinal anesthesia.  HA now resolved, continue routine post op care, anticipate discharge tomorrow.         Anemia in pregnancy, delivered, current hospitalization    showing mild anemia due to intrapartum blood loss. The patient is asymptomatic of the anemia - no intervention is indicated              Disposition: As patient meets milestones, will plan to discharge tomorrow.    Elisa Power MD  Obstetrics  [facility]

## 2018-02-03 NOTE — PROGRESS NOTES
Went by to speak with Mrs. Fountain today. Pt is doing well with no headache at the time of the visit. Pt states Fioricet and caffeine alleviate her headache. She is concerned about going home and wants a prescription prior to going home. Reassured her that someone from the anesthesia team will be by to see her tomorrow prior to discharge.     Vipul Johnson MD  CA-3

## 2018-02-03 NOTE — ADDENDUM NOTE
Addendum  created 02/03/18 5564 by Vipul Johnson Jr., MD    Actions taken from a BestPractice Advisory, Sign clinical note

## 2018-02-03 NOTE — LACTATION NOTE
"   02/03/18 9320   Maternal Infant Assessment   Breast Shape Bilateral:;round   Breast Density Bilateral:;soft   Areola Bilateral:;elastic   Nipple(s) Bilateral:;everted   Infant Assessment   Sucking Reflex present   Rooting Reflex present   Swallow Reflex present   LATCH Score   Latch 1-->repeated attempts, holds nipple in mouth, stimulate to suck   Audible Swallowing 1-->a few with stimulation   Type Of Nipple 2-->everted (after stimulation)   Comfort (Breast/Nipple) 2-->soft/nontender   Hold (Positioning) 1-->minimal assist, teach one side: mother does other, staff holds   Score (less than 7 for 2/more consecutive times, consult Lactation Consultant) 7   Maternal Infant Feeding   Maternal Emotional State tense   Infant Positioning clutch/"football";cross-cradle   Signs of Milk Transfer audible swallow   Time Spent (min) 30-60 min   Latch Assistance yes   Breastfeeding Education adequate infant intake;importance of skin-to-skin contact;increasing milk supply   Breastfeeding History   Currently Breastfeeding yes   Feeding Infant   Effective Latch During Feeding yes   Audible Swallow yes   Suck/Swallow Coordination present   Skin-to-Skin Contact During Feeding yes   Lactation Referrals   Lactation Consult Breastfeeding assessment;Follow up   Lactation Interventions   Attachment Promotion breastfeeding assistance provided;counseling provided;skin-to-skin contact encouraged   Breastfeeding Assistance assisted with positioning;feeding cue recognition promoted;infant latch-on verified;infant suck/swallow verified;support offered   Maternal Breastfeeding Support lactation counseling provided   Latch Promotion positioning assisted;infant moved to breast   pt states baby was "on breast all night." Discuss with pt if baby was sucking while latch or just latched. Pt said baby did not suck much and would come off breast and become fussy. Assisted pt with position and latch. Baby was able to latch easily to breast in football " and cross cradle. Baby latches to breast and sucks a few times. Pt shown how to keep baby actively sucking using breast compression and stimulation to keep baby actively nursing. Breastfeeding education given. Questions answered.

## 2018-02-03 NOTE — ASSESSMENT & PLAN NOTE
Continue routine care  Anesthesia monitoring headache - mild -but could be Spinal due to difficulty getting Spinal anesthesia.  HA now resolved, continue routine post op care, anticipate discharge tomorrow.

## 2018-02-03 NOTE — SUBJECTIVE & OBJECTIVE
Hospital course: HD#1 -  delivery  - no complications.   POD#1 - patient doing well; but mild mid-line headache possibly due to CSE - anesthesia treating with Fioricet and PO fluids.   POD#2 - patient is without complaints, HA resolved, pain controlled.      She is doing well this morning. She is tolerating a regular diet without nausea or vomiting. She is voiding spontaneously. She is ambulating. She has passed flatus, and has not a BM. Vaginal bleeding is mild. She denies fever or chills. Abdominal pain is mild and controlled with oral medications. She is breastfeeding.     Objective:     Vital Signs (Most Recent):  Temp: 98.7 °F (37.1 °C) (18)  Pulse: 74 (18)  Resp: 18 (18)  BP: (!) 96/51 (18)  SpO2: 97 % (18) Vital Signs (24h Range):  Temp:  [98 °F (36.7 °C)-98.7 °F (37.1 °C)] 98.7 °F (37.1 °C)  Pulse:  [70-83] 74  Resp:  [17-18] 18  SpO2:  [97 %-100 %] 97 %  BP: ()/(51-60) 96/51     Weight: 76.7 kg (169 lb)  Body mass index is 30.91 kg/m².      Intake/Output Summary (Last 24 hours) at 18 0825  Last data filed at 18 1000   Gross per 24 hour   Intake                0 ml   Output             1200 ml   Net            -1200 ml       Significant Labs:  Lab Results   Component Value Date    GROUPTRH A POS 2018    HEPBSAG Negative 06/15/2017    STREPBCULT No Group B Streptococcus isolated 2018    AFP 0.69 MoM (21.7 ng/mL) 2009       Recent Labs  Lab 18  0410   HGB 8.4*   HCT 27.1*       I have personallly reviewed all pertinent lab results from the last 24 hours.    Physical Exam:   Constitutional: She is oriented to person, place, and time. She appears well-developed and well-nourished. No distress.       Cardiovascular: Normal rate.     Pulmonary/Chest: No respiratory distress.        Abdominal: Soft. She exhibits abdominal incision. She exhibits no distension. There is no tenderness. There is no rebound and no  guarding.   Incision healing well without drainage or erythema     Genitourinary:   Genitourinary Comments: Fundus firm, Nt, below umbilicus           Musculoskeletal: She exhibits no edema or tenderness.       Neurological: She is alert and oriented to person, place, and time.    Skin: Skin is warm and dry.    Psychiatric: She has a normal mood and affect.

## 2018-02-04 VITALS
HEART RATE: 101 BPM | SYSTOLIC BLOOD PRESSURE: 118 MMHG | DIASTOLIC BLOOD PRESSURE: 61 MMHG | RESPIRATION RATE: 18 BRPM | TEMPERATURE: 98 F | OXYGEN SATURATION: 97 % | WEIGHT: 169 LBS | BODY MASS INDEX: 31.1 KG/M2 | HEIGHT: 62 IN

## 2018-02-04 PROBLEM — Z3A.35 35 WEEKS GESTATION OF PREGNANCY: Status: RESOLVED | Noted: 2018-02-01 | Resolved: 2018-02-04

## 2018-02-04 PROCEDURE — 25000003 PHARM REV CODE 250: Performed by: OBSTETRICS & GYNECOLOGY

## 2018-02-04 PROCEDURE — 25000003 PHARM REV CODE 250: Performed by: STUDENT IN AN ORGANIZED HEALTH CARE EDUCATION/TRAINING PROGRAM

## 2018-02-04 PROCEDURE — 99024 POSTOP FOLLOW-UP VISIT: CPT | Mod: ,,, | Performed by: OBSTETRICS & GYNECOLOGY

## 2018-02-04 RX ORDER — OXYCODONE AND ACETAMINOPHEN 5; 325 MG/1; MG/1
1 TABLET ORAL EVERY 4 HOURS PRN
Qty: 40 TABLET | Refills: 0 | Status: SHIPPED | OUTPATIENT
Start: 2018-02-04 | End: 2018-02-21

## 2018-02-04 RX ORDER — IBUPROFEN 600 MG/1
600 TABLET ORAL EVERY 6 HOURS
Qty: 45 TABLET | Refills: 1 | Status: SHIPPED | OUTPATIENT
Start: 2018-02-04 | End: 2018-02-21

## 2018-02-04 RX ORDER — OXYCODONE AND ACETAMINOPHEN 5; 325 MG/1; MG/1
1 TABLET ORAL EVERY 4 HOURS PRN
Qty: 40 TABLET | Refills: 0 | Status: SHIPPED | OUTPATIENT
Start: 2018-02-04 | End: 2018-02-04

## 2018-02-04 RX ORDER — BUTALBITAL, ACETAMINOPHEN AND CAFFEINE 50; 325; 40 MG/1; MG/1; MG/1
1 TABLET ORAL EVERY 4 HOURS
Qty: 30 TABLET | Refills: 0 | Status: SHIPPED | OUTPATIENT
Start: 2018-02-04 | End: 2018-02-21

## 2018-02-04 RX ORDER — LANOLIN ALCOHOL/MO/W.PET/CERES
400 CREAM (GRAM) TOPICAL ONCE
Status: COMPLETED | OUTPATIENT
Start: 2018-02-04 | End: 2018-02-04

## 2018-02-04 RX ORDER — LANOLIN ALCOHOL/MO/W.PET/CERES
400 CREAM (GRAM) TOPICAL DAILY
Refills: 0 | COMMUNITY
Start: 2018-02-04 | End: 2018-02-21

## 2018-02-04 RX ADMIN — BUTALBITAL, ACETAMINOPHEN AND CAFFEINE 1 TABLET: 50; 325; 40 TABLET ORAL at 02:02

## 2018-02-04 RX ADMIN — OXYCODONE HYDROCHLORIDE AND ACETAMINOPHEN 1 TABLET: 10; 325 TABLET ORAL at 12:02

## 2018-02-04 RX ADMIN — IBUPROFEN 600 MG: 600 TABLET, FILM COATED ORAL at 06:02

## 2018-02-04 RX ADMIN — IBUPROFEN 600 MG: 600 TABLET, FILM COATED ORAL at 12:02

## 2018-02-04 RX ADMIN — Medication 400 MG: at 02:02

## 2018-02-04 RX ADMIN — BUTALBITAL, ACETAMINOPHEN AND CAFFEINE 1 TABLET: 50; 325; 40 TABLET ORAL at 10:02

## 2018-02-04 RX ADMIN — OXYCODONE HYDROCHLORIDE AND ACETAMINOPHEN 1 TABLET: 10; 325 TABLET ORAL at 08:02

## 2018-02-04 RX ADMIN — BUTALBITAL, ACETAMINOPHEN AND CAFFEINE 1 TABLET: 50; 325; 40 TABLET ORAL at 06:02

## 2018-02-04 RX ADMIN — DOCUSATE SODIUM 200 MG: 100 CAPSULE, LIQUID FILLED ORAL at 08:02

## 2018-02-04 NOTE — PROGRESS NOTES
Pt seen again, she did not remember being seen this AM.  We discussed her headaches, and she has read online that fioricet causes feeding problems, and her baby is having feeding problems and losing weight.  This has her very concerned.  She asked if there are alternatives to fioricet.    She is first, reassured that we use fioricet safely post-partum with minimal complications.  As far as headache relief, she could use tylenol, percocet and motrin - with care not to exceed 4g of tylenol a day.  We could also try magensium oxide, although I am not sure this would help with HA caused by spinal placement.  Magnesium, however, is safe in breast feeding, so I will give her the dosage so that she can buy it OTC.  She can see if it helps.  If not, she can always pump and dump while taking fioricet, and supplement with formula until headache improves which should be over the next week.      All of these options d/w Ms. Fountain.  She will sent home with fioricet and magnesium oxide so that she can use either one.     Aishwarya Reinoso, DO

## 2018-02-04 NOTE — PLAN OF CARE
Problem: Patient Care Overview  Goal: Plan of Care Review  Outcome: Ongoing (interventions implemented as appropriate)  Patient is nursing baby on cue 8 or more times/24 hours; if baby does not latch and nurse with audible swallows until content she will pump x 20-30 min then supplement baby with EBM/formula only if needed.

## 2018-02-04 NOTE — NURSING
Pt states she was instructed by Lesley evans to pump beginning tonight.      Medela Symphony pump, tubing, collections containers and labels brought to bedside.  Discussed proper pump setting, Preemie+ or Standard for babys gestational age.  Instructed on proper usage of pump and to adjust suction according to maximum comfort level.  Verified appropriate flange fit.  Educated on the frequency and duration of pumping in order to promote and maintain a full milk supply.  Hands on pumping technique reviewed.  Encouraged hand expression after pumping.  Instructed on cleaning of breast pump parts.  Written instructions also given.  Pt verbalized understanding and appropriate recall for proper milk handling, collection, labeling, storage and transportation.

## 2018-02-04 NOTE — SUBJECTIVE & OBJECTIVE
Hospital course: HD#1 -  delivery  - no complications.   POD#1 - patient doing well; but mild mid-line headache possibly due to CSE - anesthesia treating with Fioricet and PO fluids.   POD#2 - patient is without complaints, HA resolved, pain controlled.    POD#3 - Ms Fountain is resting, no major complaints.  HA controlled with fioricet      She is doing well this morning. She is tolerating a regular diet without nausea or vomiting. She is voiding spontaneously. She is ambulating. She has passed flatus, and has not a BM. Vaginal bleeding is moderate. She denies fever or chills. Abdominal pain is mild and controlled with oral medications. She is breastfeeding.     Objective:     Vital Signs (Most Recent):  Temp: 98.4 °F (36.9 °C) (18 0005)  Pulse: 101 (18 0005)  Resp: 18 (18 0005)  BP: 118/61 (18 0005)  SpO2: 97 % (18 0005) Vital Signs (24h Range):  Temp:  [98.4 °F (36.9 °C)-98.8 °F (37.1 °C)] 98.4 °F (36.9 °C)  Pulse:  [] 101  Resp:  [18] 18  SpO2:  [97 %] 97 %  BP: (114-118)/(57-61) 118/61     Weight: 76.7 kg (169 lb)  Body mass index is 30.91 kg/m².    No intake or output data in the 24 hours ending 18 0821    Significant Labs:  Lab Results   Component Value Date    GROUPTRH A POS 2018    HEPBSAG Negative 06/15/2017    STREPBCULT No Group B Streptococcus isolated 2018    AFP 0.69 MoM (21.7 ng/mL) 2009     No results for input(s): HGB, HCT in the last 48 hours.    Physical Exam:   Constitutional: She appears well-developed and well-nourished. No distress.    HENT:   Head: Normocephalic and atraumatic.       Pulmonary/Chest: Effort normal. No respiratory distress.        Abdominal: Soft. She exhibits abdominal incision (healing well without sign of infection or breakdown). She exhibits no distension. There is no tenderness. There is no rebound and no guarding.   Fundus firm, NT, below umbilicus               Musculoskeletal: She exhibits no edema.        Neurological: She is alert.    Skin: Skin is warm and dry. She is not diaphoretic.    Psychiatric: She has a normal mood and affect.

## 2018-02-04 NOTE — PLAN OF CARE
Problem: Patient Care Overview  Goal: Plan of Care Review  Pt doing well, ok to d/c home with baby. Pt will rtc in 2 weeks.

## 2018-02-04 NOTE — LACTATION NOTE
"   02/04/18 0930   Maternal Medical Surgical History   Surgical History yes   Surgical Procedure breast augmentation   Maternal Infant Assessment   Breast Shape Bilateral:;round;pendulous   Breast Density Bilateral:;full   Areola Bilateral:;elastic   Nipple(s) Bilateral:;everted   Infant Assessment   Sucking Reflex present   Rooting Reflex present   Swallow Reflex present   LATCH Score   Latch 1-->repeated attempts, holds nipple in mouth, stimulate to suck   Audible Swallowing 1-->a few with stimulation   Type Of Nipple 2-->everted (after stimulation)   Comfort (Breast/Nipple) 1-->filling, red/small blisters/bruises, mild/mod discomfort   Hold (Positioning) 0-->full assist (staff holds infant at breast)   Score (less than 7 for 2/more consecutive times, consult Lactation Consultant) 5   Maternal Infant Feeding   Maternal Emotional State assist needed   Infant Positioning clutch/"football"   Time Spent (min) 60-90 min   Latch Assistance yes   Engorgement Measures complete emptying encouraged   Breastfeeding History   Currently Breastfeeding yes   Breastfeeding History yes   Previous Exclusive Breastfeeding yes   Previous Breastfeeding Success other (see comments)  (second chikd unsuccessful)   Feeding Infant   Feeding Readiness Cues crying;rooting   Feeding Tolerance/Success arousal required;suck inconsistent   Effective Latch During Feeding yes   Audible Swallow yes  (several only with breast compression and let down)   Skin-to-Skin Contact During Feeding yes   Equipment Type/Education   Pump Type Symphony   Breast Pump Type double electric, hospital grade   Breast Pump Flange Type hard   Breast Pump Flange Size 30 mm   Breast Pumping Bilateral Breasts:;pumped until emptied   Lactation Referrals   Lactation Consult Breastfeeding assessment;Knowledge deficit;Pump teaching;Follow up   Lactation Interventions   Attachment Promotion breastfeeding assistance provided;counseling provided;family involvement promoted;role " responsibility promoted;skin-to-skin contact encouraged   Breastfeeding Assistance assisted with positioning;electric breast pump used;feeding cue recognition promoted;feeding session observed;infant latch-on verified;infant stimulated to wakeful state;infant suck/swallow verified;milk expression/pumping;support offered;supplemental feeding provided   Maternal Breastfeeding Support diary/feeding log utilized;encouragement offered;lactation counseling provided;maternal hydration promoted;maternal nutrition promoted;maternal rest encouraged   Latch Promotion positioning assisted;infant moved to breast;suck stimulated with breast milk drop   Baby laying in crib rooting and crying. Patient stated she had fed the baby a little over an hour ago. Discussed feeding baby on cue and signs of milk transfer. FOB states baby has had one large and one smear stool in the past 24 hours. Parents also state baby never seems satisfied with feeding and often will not suck at the breast. With permission, assisted mother with feeding session. Mother's breasts are full. States she was engorged last night and was brought a pump that she used several times. EBM stored in refrigerator at present. Mother positioned baby to L breast in cross cradle with assistance. Baby latches well with wide gape but will not suck consistently and will either unlatch, cry, or just keep nipple in her mouth without suckling. After attempting for 5-10 min with no success baby repositioned to L breast in football and latched with wide gape. Baby continued to show same pattern of inconsistent sucking. With maximum assistance, breast compression, and stimulation baby nursed with several audible swallows with mother's let down (flow). After 15-20 min of nursing baby was unlatched. Assisted mother setting up pump and pumped x 20 minutes until empty. Demonstrated cleaning of pump pieces. Demonstrated also paced bottle feeding and FOB was able to return demonstrate and  fed the baby 30 mls of EBM (from earlier pumping). Baby fell asleep and was content. Mother has Medela Pump In Style and Spectra pump at home. Reinforced proper flange fit (provided with 30 mm flanges) and suction level. Discussed POC for feedings for discharge: attempt to nurse the baby at each feeding using breast compression and stimulation; if after 10-15 min of attempts if baby is not nursing vigorously with audible swallows (looking for signs of milk transfer discussed) pump x20-30 min or 2 min beyond last drop seen and supplement baby with breast milk until baby is content. Use formula if no breast milk available. Encouraged to keep I&O log and to call pediatrician and/or Lactation for any questions or concerns. Parents verbalized understanding.

## 2018-02-04 NOTE — PROGRESS NOTES
[facility]  Obstetrics  Postpartum Progress Note    Patient Name: Hillary Fountain  MRN: [id number]  Admission Date: 2018  Hospital Length of Stay: 3 days  Attending Physician: Tanisha Salmeron MD  Primary Care Provider: Primary Doctor No    Subjective:     Principal Problem: delivery delivered    Hospital course: HD#1 -  delivery  - no complications.   POD#1 - patient doing well; but mild mid-line headache possibly due to CSE - anesthesia treating with Fioricet and PO fluids.   POD#2 - patient is without complaints, HA resolved, pain controlled.    POD#3 - Ms Fountain is resting, no major complaints.  HA controlled with fioricet      She is doing well this morning. She is tolerating a regular diet without nausea or vomiting. She is voiding spontaneously. She is ambulating. She has passed flatus, and has not a BM. Vaginal bleeding is moderate. She denies fever or chills. Abdominal pain is mild and controlled with oral medications. She is breastfeeding.     Objective:     Vital Signs (Most Recent):  Temp: 98.4 °F (36.9 °C) (18 0005)  Pulse: 101 (18 0005)  Resp: 18 (18 0005)  BP: 118/61 (18 0005)  SpO2: 97 % (18 0005) Vital Signs (24h Range):  Temp:  [98.4 °F (36.9 °C)-98.8 °F (37.1 °C)] 98.4 °F (36.9 °C)  Pulse:  [] 101  Resp:  [18] 18  SpO2:  [97 %] 97 %  BP: (114-118)/(57-61) 118/61     Weight: 76.7 kg (169 lb)  Body mass index is 30.91 kg/m².    No intake or output data in the 24 hours ending 18 0821    Significant Labs:  Lab Results   Component Value Date    GROUPTRH A POS 2018    HEPBSAG Negative 06/15/2017    STREPBCULT No Group B Streptococcus isolated 2018    AFP 0.69 MoM (21.7 ng/mL) 2009     No results for input(s): HGB, HCT in the last 48 hours.    Physical Exam:   Constitutional: She appears well-developed and well-nourished. No distress.    HENT:   Head: Normocephalic and atraumatic.       Pulmonary/Chest:  Effort normal. No respiratory distress.        Abdominal: Soft. She exhibits abdominal incision (healing well without sign of infection or breakdown). She exhibits no distension. There is no tenderness. There is no rebound and no guarding.   Fundus firm, NT, below umbilicus               Musculoskeletal: She exhibits no edema.       Neurological: She is alert.    Skin: Skin is warm and dry. She is not diaphoretic.    Psychiatric: She has a normal mood and affect.       Assessment/Plan:     32 y.o. female  for:    *  delivery delivered    Stable for d/c home        Anemia in pregnancy, delivered, current hospitalization    showing mild anemia due to intrapartum blood loss. The patient is asymptomatic of the anemia - no intervention is indicated              Disposition: As patient meets milestones, will plan to discharge today.    Aishwarya Reinoso DO  Obstetrics  [facility]

## 2018-02-06 ENCOUNTER — TELEPHONE (OUTPATIENT)
Dept: OBSTETRICS AND GYNECOLOGY | Facility: CLINIC | Age: 33
End: 2018-02-06

## 2018-02-06 ENCOUNTER — SOCIAL WORK (OUTPATIENT)
Dept: CASE MANAGEMENT | Facility: OTHER | Age: 33
End: 2018-02-06

## 2018-02-06 ENCOUNTER — TELEPHONE (OUTPATIENT)
Dept: LACTATION | Facility: CLINIC | Age: 33
End: 2018-02-06

## 2018-02-06 ENCOUNTER — HOSPITAL ENCOUNTER (EMERGENCY)
Facility: OTHER | Age: 33
Discharge: HOME | End: 2018-02-06
Attending: OBSTETRICS & GYNECOLOGY
Payer: COMMERCIAL

## 2018-02-06 VITALS
TEMPERATURE: 97 F | SYSTOLIC BLOOD PRESSURE: 116 MMHG | RESPIRATION RATE: 20 BRPM | DIASTOLIC BLOOD PRESSURE: 63 MMHG | OXYGEN SATURATION: 99 % | HEART RATE: 79 BPM

## 2018-02-06 DIAGNOSIS — M25.571 ACUTE RIGHT ANKLE PAIN: ICD-10-CM

## 2018-02-06 DIAGNOSIS — G57.21 FEMORAL NEUROPATHY OF RIGHT LOWER EXTREMITY: ICD-10-CM

## 2018-02-06 DIAGNOSIS — R29.898 WEAKNESS OF RIGHT LOWER EXTREMITY: Primary | ICD-10-CM

## 2018-02-06 PROCEDURE — 59025 FETAL NON-STRESS TEST: CPT

## 2018-02-06 PROCEDURE — 99284 EMERGENCY DEPT VISIT MOD MDM: CPT | Mod: 25

## 2018-02-06 PROCEDURE — 99284 EMERGENCY DEPT VISIT MOD MDM: CPT | Mod: ,,, | Performed by: OBSTETRICS & GYNECOLOGY

## 2018-02-06 NOTE — PLAN OF CARE
Problem: Physical Therapy Goal  Goal: Physical Therapy Goal  Outcome: Outcome(s) achieved Date Met: 02/06/18  PT evaluation completed. Pt requires supervision for gait with axillary crutches. PT findings significant for bilateral LE pitting edema, anterior ankle pain, point tenderness to R SI joint, impaired sensation R posterior calf and ankle, impaired strength throughout R LE. R hip flexion weakness seems more associated with post surgical abdominal pain. R knee flexion weakness and R ankle plantarflexion weakness seems more associated with neurological impairment and consistent with nerve impingement (consider L5/S1/S2 nerve roots versus sciatic/tibial/sural peripheral nerves). Recommend patient follows up with neuro and outpatient PT for above impairments. Axillary crutches provided to patient.

## 2018-02-06 NOTE — TELEPHONE ENCOUNTER
Swing pp pt - pt had a  on  and thinks she may have a blood clot. Her right arm and leg are swollen and won't go down.

## 2018-02-06 NOTE — PROGRESS NOTES
"Called by ED OB regarding Mrs. Fountain's RLE weakness. Evaluated patient at bedside. She states that the weakness began yesterday and was sudden onset. Denies trauma at or before weakness began. She states that the weakness is throughout the entire leg all the way up to the hip. She also endorses a "strange" sensation but denies pain. She does have some lower back pain which has been present since giving birth. Per , he has to  her right leg to get into bed, in car, walk, etc.     On my exam, patient has bilateral LE weakness with 4/5 plantar and dorsiflexion. She does have 5/5 motor strength in left thigh and is able to lift it off the bed. She cannot lift right leg off bed. On sensory exam, she has full sensation on left and right side but notes that "it feels different" on the right.     As this does not appear to be a central neurologic problem or problem related to spinal anesthetic, recommend consulting neurology to better elucidate distribution of potential neuropathy and to help guide imaging if warranted. Discussed with staff, Dr. Gusman.    Geneva Christine MD  CA1, Anesthesiology  "

## 2018-02-06 NOTE — TELEPHONE ENCOUNTER
Pt states baby is breastfeeding better. Baby's weight is 7#2oz at peds visit. Pt presently in maycol with swelling but is getting discharged now. Pt has been pumping but not today. Encouraged pt to pump at least a couple times daily and use breast compression to keep baby actively nursing. Pt to call lc prn.

## 2018-02-06 NOTE — TELEPHONE ENCOUNTER
"She did agree to go but was hesitant and said she will tell "him" when he gets home.  i'm not sure when she plans to go to the ER but I did tell her to go ASAP.    "

## 2018-02-06 NOTE — ED PROVIDER NOTES
Encounter Date: 2018       History     Chief Complaint   Patient presents with    Leg Swelling     Hillary Fountain is a 32 y.o. [id number] POD#5 s/p repeat  section presents complaining of swelling in her extremities and right-sided leg weakness and ankle pain. She states the swelling has occurred intermittently since before delivery.  However, she has new onset right leg weakness which began yesterday morning after she woke up; she noticed that she was having progressively more difficulty getting in and out of bed. Eventually, her  has had to help her and carry her right leg when she gets in an out of bed and when she got into the car. Her pain is in her right ankle and is intermittent, local pain on the inside of her ankle. She denies SOB or chest pain. She has a mild headache.          Review of patient's allergies indicates:  No Known Allergies  Past Medical History:   Diagnosis Date    Kidney infection     kidney stones aand kidney infection 2012/and 13     Past Surgical History:   Procedure Laterality Date    BREAST SURGERY      augmentation     SECTION  2004     SECTION  2010     SECTION  2015    CHOLECYSTECTOMY      2012    DILATION AND CURETTAGE OF UTERUS  2005    TAB     Family History   Problem Relation Age of Onset    Diabetes Maternal Grandfather     Diabetes Father     No Known Problems Mother     Diabetes Paternal Grandfather     Dementia Paternal Grandmother     No Known Problems Maternal Grandmother     No Known Problems Sister     No Known Problems Sister     Breast cancer Neg Hx     Colon cancer Neg Hx     Ovarian cancer Neg Hx     Hypertension Neg Hx     Stroke Neg Hx      Social History   Substance Use Topics    Smoking status: Never Smoker    Smokeless tobacco: Never Used    Alcohol use No     Review of Systems   Constitutional: Negative for chills and fever.   Eyes: Negative for visual disturbance.    Respiratory: Negative for shortness of breath.    Cardiovascular: Negative for chest pain.   Gastrointestinal: Negative for abdominal pain, nausea and vomiting.   Genitourinary: Negative for vaginal discharge.   Neurological: Positive for weakness (weakness at all joints of right lower extremity), numbness (partial numbness of right lower extremity) and headaches (mild). Negative for dizziness, syncope, facial asymmetry, speech difficulty and light-headedness.       Physical Exam     Initial Vitals   BP Pulse Resp Temp SpO2   02/06/18 1416 02/06/18 1416 02/06/18 1417 02/06/18 1417 02/06/18 1417   113/68 80 18 97.3 °F (36.3 °C) 98 %      MAP       02/06/18 1416       83         Physical Exam    Vitals reviewed.  Constitutional: She appears well-developed and well-nourished. She is not diaphoretic. No distress.   HENT:   Head: Normocephalic and atraumatic.   Eyes: EOM are normal.   Neck: Normal range of motion.   Cardiovascular: Normal rate, regular rhythm and normal heart sounds.   Pulmonary/Chest: Breath sounds normal. No respiratory distress. She has no wheezes. She has no rhonchi. She has no rales.   Abdominal: Soft. There is no tenderness.   Low transverse incision, clean/dry/intact, no erythema or discharge.   Neurological: She is alert and oriented to person, place, and time.   Skin: Skin is warm and dry.   Psychiatric: She has a normal mood and affect. Her behavior is normal. Judgment and thought content normal.         ED Course   Procedures  Labs Reviewed - No data to display          Medical Decision Making:   ED Management:  VSS. No h/o elevated blood pressures.  Anesthesia called, came to see patient. Do not believe etiology is related to anesthesia procedures while admitted.  CT without contrast (spoke with radiology, neurology - agree with this imaging) ordered. No intracranial abnormality - reviewed by neurologist on call who agrees.  Neurology recommended if negative head imaging to f/u with  neurology outpatient for EMG.   PT/SW consulted to evaluate patient for walking aid during interval until outpatient neurology follow up. PT recommends crutches; gave patient crutches to use. Recommended outpatient consult to physical therapy.   Lower extremity doppler ultrasound done. No evidence of DVT  Patient discharged with outpatient consults to PT and neurology for further workup of weakness. Crutches given to aid in mobility until outpatient follow up.    Outpatient consult to [facility]  [phone]  Other:   I have discussed this case with another health care provider.       <> Summary of the Discussion: D/w Dr. Harper, Dr. Galarza (neurologist on call), Anesthesiology resident on call.              Attending Attestation:   Physician Attestation Statement for Resident:  As the supervising MD   Physician Attestation Statement: I have personally seen and examined this patient.   I agree with the above history. -:   As the supervising MD I agree with the above PE.    As the supervising MD I agree with the above treatment, course, plan, and disposition.   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  I was personally present during the critical portions of the procedure(s) performed by the resident and was immediately available in the ED to provide services and assistance as needed during the entire procedure.  I have reviewed and agree with the residents interpretation of the following: lab data and CT scans.  I have reviewed the following: old records at this facility.                    ED Course      Clinical Impression:   The primary encounter diagnosis was Weakness of right lower extremity. Diagnoses of Postpartum edema, Acute right ankle pain, and  delivery delivered were also pertinent to this  visit.             Damaris Bower MD  Resident  02/06/18 2004       Damaris Bower MD  Resident  02/06/18 2009       Yoon Harper MD  02/06/18 1581

## 2018-02-06 NOTE — TELEPHONE ENCOUNTER
Pt states her right side (arm and leg) is more swollen than left.  Pain on right side mainly by ankle but states she cannot lift leg to get in bed or the car.  Denies erythema.  Recommended she go to ER now.

## 2018-02-07 NOTE — DISCHARGE INSTRUCTIONS
Consults were ordered for you to follow up with neurology and physical therapy outpatient. You should get calls to schedule appointments. The phone number for physical therapy is [phone] - call if you have not heard from them by the end of the week.

## 2018-02-07 NOTE — OPERATIVE NOTE ADDENDUM
Certification of Assistant at Surgery       Surgery Date: 2018     Participating Surgeons:  Surgeon(s) and Role:     * Tanisha Salmeron MD - Primary     * Kirsten Tai MD - Assisting    Procedures:  Procedure(s) (LRB):  DELIVERY- SECTION (N/A)    Assistant Surgeon's Certification of Necessity:  I understand that section 1842 (b) (6) (d) of the Social Security Act generally prohibits Medicare Part B reasonable charge payment for the services of assistants at surgery in [facility] hospitals when qualified residents are available to furnish such services. I certify that the services for which payment is claimed were medically necessary, and that no qualified resident was available to perform the services. I further understand that these services are subject to post-payment review by the Medicare carrier.      Kirsten Tai MD    2018  11:25 AM

## 2018-02-08 NOTE — PROGRESS NOTES
Met with pt after consult ordered for DME.    Met with pt and introduced self. Explained sw role. Awaited PT eval to determine DME needed. After PT eval, it was determined pt only needed crutches. They were provided to pt by PT. PT also rec'd outpt PT. Referal sent to [facility] out[facility] PT my resident OB.   No further d/c planning needs.    Rebecca Bishop LCSW    [facility]  [email or web address]    (phone) [phone] or  Ext. [phone]  (fax) [phone]

## 2018-02-12 NOTE — PHYSICIAN QUERY
"PT Name: Hillary Fountain  MR #: [id number]    Physician Query Form - Hematology Clarification      CDS/: KM Donaldson,[email or web address]           Contact information:[email or web address]    This form is a permanent document in the medical record.      Query Date: 2018    By submitting this query, we are merely seeking further clarification of documentation. Please utilize your independent clinical judgment when addressing the question(s) below.    The Medical record contains the following:   Indicators  Supporting Clinical Findings Location in Medical Record   X "Anemia" documented Anemia in pregnancy, delivered, current hospitalization    showing mild anemia due to intrapartum blood loss. The patient is asymptomatic of the anemia - no intervention is indicated OB Progress note @822am   X H & H = Hgb=8.4-9.8  Hct=27.1-31.1 LAB -    BP =                     HR=      "GI bleeding" documented     X Acute bleeding (Non GI site) ESTIMATED BLOOD LOSS:  500 mL. Op note 2/5    Transfusion(s)      Treatment:     X Other:  PROCEDURE:  Repeat low transverse  section via Pfannenstiel skin   incision with scar revision. Op note 2/5     Provider, please specify diagnosis or diagnoses associated with above clinical findings.    [  ] Acute blood loss anemia expected post-operatively  [  ] Acute blood loss anemia  [x  ] Other Hematological Diagnosis (please specify): __chronic iron deficiency anemia_______________________________  [  ] Clinically Undetermined       Please document in your progress notes daily for the duration of treatment, until resolved, and include in your discharge summary.                                                                                                      "

## 2018-02-12 NOTE — PHYSICIAN QUERY
PT Name: Hillary Fountain  MR #: [id number]     Physician Query Form - Diagnosis Clarification      CDS/: KM Donaldson,RNC-MNN           Contact information:[email or web address]    This form is a permanent document in the medical record.     Query Date: February 12, 2018    By submitting this query, we are merely seeking further clarification of documentation.  Please utilize your independent clinical judgment when addressing the question(s) below.     The medical record contains the following:      Findings Supporting Clinical Information Location in Medical Record   mild mid-line headache possibly due to CSE          but mild mid-line headache possibly due to CSE - anesthesia treating with Fioricet and PO fluids.     Continue routine care  Anesthesia monitoring headache - mild -but could be Spinal due to difficulty getting Spinal anesthesia.  HA now resolved, continue routine post op care, anticipate discharge tomorrow.       Went by to speak with Mrs. Fountain today. Pt is doing well with no headache at the time of the visit. Pt states Fioricet and caffeine alleviate her headache.     Nursing called ~2230 about the pt's c/o HA. At bedside, pt states her HA was initially frontal, but has since worsened and encompasses her entire head. No relief from tylenol or toradol. HA is constant and positional in nature. No other deficits noted. Discussed with pt that she is likely experiencing a PDPH and how it occurred and what to expect. Advised pt to lay as flat as is comfortable and drink lots of fluids. Ordered IVF cont @ 125ml/h and Fiorecets q4 scheduled. Will cont to monitor. OB Progress note 2/4@822am              OB Progress note 2/3@827am              Anesthesiology note 2/3@451pm        Anesthesiology note 2/1@1107pm     Please clarify if the PDPH diagnosis has been:    [  ] Ruled In  [  ] Ruled In, Now Resolved  [  ] Ruled Out  [  ] Clinically insignificant  [  ] Clinically undetermined  [ x ]  Other/Clarification of findings (please specify)____I was not involved in ths part of her care.  Please send to anesthesia or OB MD who saw her.___________________________    Please document in your progress notes daily for the duration of treatment, until resolved, and include in your discharge summary.

## 2018-02-16 NOTE — PHYSICIAN QUERY
PT Name: Hillary Fountain  MR #: [id number]     Physician Query Form - Diagnosis Clarification      CDS/: KM Donaldson,RNC-MNN           Contact information:[email or web address]    This form is a permanent document in the medical record.     Query Date: February 16, 2018    By submitting this query, we are merely seeking further clarification of documentation.  Please utilize your independent clinical judgment when addressing the question(s) below.     The medical record contains the following:      Findings Supporting Clinical Information Location in Medical Record   mild mid-line headache possibly due to CSE          but mild mid-line headache possibly due to CSE - anesthesia treating with Fioricet and PO fluids.     Continue routine care  Anesthesia monitoring headache - mild -but could be Spinal due to difficulty getting Spinal anesthesia.  HA now resolved, continue routine post op care, anticipate discharge tomorrow.       Went by to speak with Mrs. Fountain today. Pt is doing well with no headache at the time of the visit. Pt states Fioricet and caffeine alleviate her headache.     Nursing called ~2230 about the pt's c/o HA. At bedside, pt states her HA was initially frontal, but has since worsened and encompasses her entire head. No relief from tylenol or toradol. HA is constant and positional in nature. No other deficits noted. Discussed with pt that she is likely experiencing a PDPH and how it occurred and what to expect. Advised pt to lay as flat as is comfortable and drink lots of fluids. Ordered IVF cont @ 125ml/h and Fiorecets q4 scheduled. Will cont to monitor. OB Progress note 2/4@822am              OB Progress note 2/3@827am              Anesthesiology note 2/3@451pm        Anesthesiology note 2/1@1107pm     Please clarify if the PDPH diagnosis has been:    [ x ] Ruled In  [  ] Ruled In, Now Resolved  [  ] Ruled Out  [  ] Clinically insignificant  [  ] Clinically undetermined  [  ]  "Other/Clarification of findings (please specify)_______________________________    Please document in your progress notes daily for the duration of treatment, until resolved, and include in your discharge summary.      In anesthesia note on 2/2/18 at 1518 by Dr. Malik Brower, in the follow up needed section, he states "continue follow up for PDPH".  Also, in the Anesthesia note on 2/1/18 for the procedure note placement of epidural, it states that there was a dural puncture on 1st attempt.  Based on this documentation, I would assume that anesthesia made the diagnosis of PDPH    Aishwarya Reinoso DO                                                                                "

## 2018-02-21 ENCOUNTER — POSTPARTUM VISIT (OUTPATIENT)
Dept: OBSTETRICS AND GYNECOLOGY | Facility: CLINIC | Age: 33
End: 2018-02-21
Payer: COMMERCIAL

## 2018-02-21 VITALS
DIASTOLIC BLOOD PRESSURE: 68 MMHG | HEIGHT: 62 IN | BODY MASS INDEX: 28.21 KG/M2 | WEIGHT: 153.31 LBS | SYSTOLIC BLOOD PRESSURE: 104 MMHG

## 2018-02-21 DIAGNOSIS — Z09 POSTOP CHECK: Primary | ICD-10-CM

## 2018-02-21 PROCEDURE — 99024 POSTOP FOLLOW-UP VISIT: CPT | Mod: S$GLB,,, | Performed by: OBSTETRICS & GYNECOLOGY

## 2018-02-21 PROCEDURE — 99999 PR PBB SHADOW E&M-EST. PATIENT-LVL III: CPT | Mod: PBBFAC,,, | Performed by: OBSTETRICS & GYNECOLOGY

## 2018-03-15 ENCOUNTER — POSTPARTUM VISIT (OUTPATIENT)
Dept: OBSTETRICS AND GYNECOLOGY | Facility: CLINIC | Age: 33
End: 2018-03-15
Payer: COMMERCIAL

## 2018-03-15 VITALS
BODY MASS INDEX: 27.36 KG/M2 | HEIGHT: 62 IN | DIASTOLIC BLOOD PRESSURE: 64 MMHG | SYSTOLIC BLOOD PRESSURE: 118 MMHG | WEIGHT: 148.69 LBS

## 2018-03-15 DIAGNOSIS — Z30.41 ENCOUNTER FOR SURVEILLANCE OF CONTRACEPTIVE PILLS: Primary | ICD-10-CM

## 2018-03-15 PROCEDURE — 99999 PR PBB SHADOW E&M-EST. PATIENT-LVL III: CPT | Mod: PBBFAC,,, | Performed by: OBSTETRICS & GYNECOLOGY

## 2018-03-15 RX ORDER — ACETAMINOPHEN AND CODEINE PHOSPHATE 120; 12 MG/5ML; MG/5ML
1 SOLUTION ORAL DAILY
Qty: 30 TABLET | Refills: 11 | Status: SHIPPED | OUTPATIENT
Start: 2018-03-15 | End: 2018-03-23 | Stop reason: SDUPTHER

## 2018-03-15 NOTE — PROGRESS NOTES
Hillary Fountain is a 32 y.o. female  presents for a postpartum visit.  She is status post  delivery 6 weeks ago.  Her hospitalization was not complicated.  She is breastfeeding.  She desires no method for contraception.  She denies postpartum depression.    Last annual exam:  17    Current Outpatient Prescriptions:     PEDI MULTIVIT 17/IRON FUMARATE (FLINTSTONES PLUS IRON ORAL), Take 1 tablet by mouth once daily., Disp: , Rfl:     There were no vitals filed for this visit.  There is no height or weight on file to calculate BMI.    Physical Exam:  General: healthy, alert, no distress  Abdomen: Normal, benign. and no masses, hepatosplenomegaly, no hernias  Incision:  Clean, dry, and intact  External genitalia: normal, well-healed, without lesions or masses  Vagina: normal, well-healed, physiologic discharge, without lesions  Cervix: normal, well-healed, without lesions  Uterus: normal size, well involuted, firm, non-tender  Adnexa: no masses palpable and nontender    Assessment:  Normal postpartum exam    Plan:    There are no diagnoses linked to this encounter. status post  delivery.    Regular activity.  Follow up in 2 months for an annual exam.

## 2018-03-21 ENCOUNTER — TELEPHONE (OUTPATIENT)
Dept: OBSTETRICS AND GYNECOLOGY | Facility: CLINIC | Age: 33
End: 2018-03-21

## 2018-03-23 DIAGNOSIS — Z30.41 ENCOUNTER FOR SURVEILLANCE OF CONTRACEPTIVE PILLS: ICD-10-CM

## 2018-03-23 RX ORDER — ACETAMINOPHEN AND CODEINE PHOSPHATE 120; 12 MG/5ML; MG/5ML
1 SOLUTION ORAL DAILY
Qty: 90 TABLET | Refills: 3 | Status: SHIPPED | OUTPATIENT
Start: 2018-03-23 | End: 2018-12-12

## 2018-07-16 ENCOUNTER — OFFICE VISIT (OUTPATIENT)
Dept: OBSTETRICS AND GYNECOLOGY | Facility: CLINIC | Age: 33
End: 2018-07-16
Attending: OBSTETRICS & GYNECOLOGY
Payer: COMMERCIAL

## 2018-07-16 VITALS
BODY MASS INDEX: 27.36 KG/M2 | DIASTOLIC BLOOD PRESSURE: 64 MMHG | HEIGHT: 62 IN | SYSTOLIC BLOOD PRESSURE: 112 MMHG | WEIGHT: 148.69 LBS

## 2018-07-16 DIAGNOSIS — Z01.419 ENCOUNTER FOR GYNECOLOGICAL EXAMINATION: Primary | ICD-10-CM

## 2018-07-16 PROCEDURE — 99395 PREV VISIT EST AGE 18-39: CPT | Mod: S$GLB,,, | Performed by: OBSTETRICS & GYNECOLOGY

## 2018-07-16 PROCEDURE — 99999 PR PBB SHADOW E&M-EST. PATIENT-LVL II: CPT | Mod: PBBFAC,,, | Performed by: OBSTETRICS & GYNECOLOGY

## 2018-07-16 RX ORDER — IBUPROFEN 600 MG/1
TABLET ORAL
Refills: 0 | COMMUNITY
Start: 2018-05-04 | End: 2018-12-12

## 2018-07-16 NOTE — PROGRESS NOTES
Subjective:       Patient ID: Hillary Fountain is a 32 y.o. female.    Chief Complaint:  No chief complaint on file.      History of Present Illness.  Hillary Fountain is a 32 y.o. female.  She has no breast or urinary symptoms.  She has no postcoital bleeding, dysmenorrhea, pelvic pain, dyspareunia, vaginal dryness, vaginal discharge, or sexual complaints.  She is breastfeeding so no period yet.  She is taking Aishwarya and  just got vasectomy.    GYN & OB History  No LMP recorded.   Date of Last Pap: 2017 Normal    OB History    Para Term  AB Living   5 4 4 0 1 4   SAB TAB Ectopic Multiple Live Births   0 0 0 0 4      # Outcome Date GA Lbr Tex/2nd Weight Sex Delivery Anes PTL Lv   5 Term 18 39w0d  3.45 kg (7 lb 9.7 oz) F CS-LTranv Spinal N FABIAN   4 Term 01/09/15 39w3d  3.502 kg (7 lb 11.5 oz) M CS-LTranv Spinal N FABIAN   3 Term 03/05/10 38w0d  2.977 kg (6 lb 9 oz) M CS-LTranv Spinal  FABIAN   2 AB 2005 8w0d          1 Term 04 37w0d  3.544 kg (7 lb 13 oz) M CS-LTranv EPI  FABIAN      Complications: Fetal Intolerance      Obstetric Comments   Age at menarche 14       Past Medical History:   Diagnosis Date    History of urinary calculi     Kidney infection     kidney stones aand kidney infection 2012/and 13     Past Surgical History:   Procedure Laterality Date    BREAST AUGMENTATION       SECTION  2004    Boy     SECTION  2010    Boy     SECTION  2015    Boy     SECTION  2018    Girl    CHOLECYSTECTOMY  2012    DILATION AND CURETTAGE OF UTERUS  2005    TAB     Family History   Problem Relation Age of Onset    Diabetes Maternal Grandfather     Diabetes Father     Hypertension Father     Anemia Mother     Diabetes Paternal Grandfather     Dementia Paternal Grandmother     No Known Problems Maternal Grandmother     No Known Problems Sister     No Known Problems Sister     Breast cancer Neg Hx     Colon cancer  Neg Hx     Ovarian cancer Neg Hx     Stroke Neg Hx     Cancer Neg Hx      Social History   Substance Use Topics    Smoking status: Never Smoker    Smokeless tobacco: Never Used    Alcohol use No       Current Outpatient Prescriptions:     norethindrone (ORTHO MICRONOR) 0.35 mg tablet, Take 1 tablet (0.35 mg total) by mouth once daily., Disp: 90 tablet, Rfl: 3    PEDI MULTIVIT 17/IRON FUMARATE (FLINTSTONES PLUS IRON ORAL), Take 1 tablet by mouth once daily., Disp: , Rfl:     Review of patient's allergies indicates:  No Known Allergies    Review of Systems  Review of Systems   Constitutional: Negative for fatigue.   HENT: Negative for trouble swallowing.    Eyes: Negative for visual disturbance.   Respiratory: Negative for cough and shortness of breath.    Cardiovascular: Negative for chest pain.   Gastrointestinal: Negative for abdominal distention, abdominal pain, blood in stool, nausea and vomiting.   Genitourinary: Negative for difficulty urinating, dyspareunia, dysuria, flank pain, frequency, hematuria, pelvic pain, urgency, vaginal bleeding, vaginal discharge and vaginal pain.   Musculoskeletal: Negative for arthralgias.   Skin: Negative for rash.   Neurological: Negative for dizziness and headaches.   Psychiatric/Behavioral: Negative for sleep disturbance. The patient is not nervous/anxious.         Objective:   There were no vitals filed for this visit.  There is no height or weight on file to calculate BMI.      Physical Exam:   Constitutional: She is oriented to person, place, and time. Vital signs are normal. She appears well-developed and well-nourished.    HENT:   Head: Normocephalic.     Neck: Normal range of motion. No thyromegaly present.     Pulmonary/Chest: Right breast exhibits no mass, no nipple discharge, no skin change, no tenderness and no swelling. Left breast exhibits no mass, no nipple discharge, no skin change, no tenderness and no swelling. Breasts are symmetrical.        Abdominal:  Soft. Normal appearance and bowel sounds are normal. She exhibits no distension. There is no tenderness.     Genitourinary: Vagina normal and uterus normal. Pelvic exam was performed with patient supine. There is no rash, tenderness, lesion or injury on the right labia. There is no rash, tenderness, lesion or injury on the left labia. Cervix is normal. Right adnexum displays no mass, no tenderness and no fullness. Left adnexum displays no mass, no tenderness and no fullness. No erythema in the vagina. No vaginal discharge found. Cervix exhibits no motion tenderness and no discharge.           Musculoskeletal: Normal range of motion.      Lymphadenopathy:        Right: No inguinal and no supraclavicular adenopathy present.        Left: No inguinal and no supraclavicular adenopathy present.    Neurological: She is alert and oriented to person, place, and time.    Skin: Skin is warm and dry.    Psychiatric: She has a normal mood and affect.        Assessment/ Plan:     Encounter for gynecological examination        Routine pap smears.  Self breast exam  Diet and exercise discussed.  Contraception reviewed/discussed.  Follow-up with me in 1 year.

## 2018-12-10 ENCOUNTER — OFFICE VISIT (OUTPATIENT)
Dept: URGENT CARE | Facility: CLINIC | Age: 33
End: 2018-12-10
Payer: COMMERCIAL

## 2018-12-10 ENCOUNTER — TELEPHONE (OUTPATIENT)
Dept: OBSTETRICS AND GYNECOLOGY | Facility: CLINIC | Age: 33
End: 2018-12-10

## 2018-12-10 VITALS — BODY MASS INDEX: 27.23 KG/M2 | RESPIRATION RATE: 18 BRPM | WEIGHT: 148 LBS | OXYGEN SATURATION: 98 % | HEIGHT: 62 IN

## 2018-12-10 DIAGNOSIS — R30.0 DYSURIA: Primary | ICD-10-CM

## 2018-12-10 DIAGNOSIS — B34.9 ACUTE VIRAL SYNDROME: ICD-10-CM

## 2018-12-10 LAB
BILIRUB UR QL STRIP: NEGATIVE
GLUCOSE UR QL STRIP: NEGATIVE
KETONES UR QL STRIP: NEGATIVE
LEUKOCYTE ESTERASE UR QL STRIP: NEGATIVE
PH, POC UA: 5.5 (ref 5–8)
POC BLOOD, URINE: NEGATIVE
POC NITRATES, URINE: NEGATIVE
PROT UR QL STRIP: NEGATIVE
SP GR UR STRIP: 1.01 (ref 1–1.03)
UROBILINOGEN UR STRIP-ACNC: NORMAL (ref 0.1–1.1)

## 2018-12-10 PROCEDURE — 3008F BODY MASS INDEX DOCD: CPT | Mod: CPTII,S$GLB,, | Performed by: NURSE PRACTITIONER

## 2018-12-10 PROCEDURE — 99203 OFFICE O/P NEW LOW 30 MIN: CPT | Mod: 25,S$GLB,, | Performed by: NURSE PRACTITIONER

## 2018-12-10 PROCEDURE — 81003 URINALYSIS AUTO W/O SCOPE: CPT | Mod: QW,S$GLB,, | Performed by: NURSE PRACTITIONER

## 2018-12-10 RX ORDER — NITROFURANTOIN 25; 75 MG/1; MG/1
100 CAPSULE ORAL 2 TIMES DAILY
Qty: 10 CAPSULE | Refills: 0 | Status: SHIPPED | OUTPATIENT
Start: 2018-12-10 | End: 2018-12-12 | Stop reason: ALTCHOICE

## 2018-12-10 RX ORDER — ONDANSETRON 4 MG/1
4 TABLET, ORALLY DISINTEGRATING ORAL EVERY 8 HOURS PRN
Qty: 12 TABLET | Refills: 0 | Status: SHIPPED | OUTPATIENT
Start: 2018-12-10 | End: 2019-03-19 | Stop reason: SDUPTHER

## 2018-12-10 RX ORDER — PHENAZOPYRIDINE HYDROCHLORIDE 200 MG/1
200 TABLET, FILM COATED ORAL 3 TIMES DAILY PRN
Qty: 6 TABLET | Refills: 0 | Status: SHIPPED | OUTPATIENT
Start: 2018-12-10 | End: 2018-12-12

## 2018-12-10 NOTE — TELEPHONE ENCOUNTER
Swing pt - pt thinks she has a UTI and would like to see if she can come in to drop off a urine sample.

## 2018-12-10 NOTE — PATIENT INSTRUCTIONS
Please follow up with your Primary care provider within 2-5 days if your signs and symptoms have not resolved or worsen.     If your condition worsens or fails to improve we recommend that you receive another evaluation at the emergency room immediately or contact your primary medical clinic to discuss your concerns.    You must understand that you have received an Urgent Care treatment only and that you may be released before all of your medical problems are known or treated.   You, the patient, will arrange for follow up care as instructed.     You have been given an antibiotic to treat your condition today.  Please complete the antibiotic as directed on the bottle.     As with any antibiotics, use probiotics and/or high culture yogurt about 2 hours apart from the antibiotic and about 1 week after the antibiotic to replace the gut collette lost with antibiotic use.      If you are female and on BCP use additional methods to prevent pregnancy while on antibiotics and for one cycle after.     Nausea/Vomiting/Diarrhea    Increase fluids and advance your diet as tolerated.      Start with liquids, soft foods, and progress into regular diet.  Please drink Pedialyte or use Pedialyte ice pops for electrolyte replacement daily as needed for fluid loss from diarrhea or vomiting.      Keep hydrated by drinking fluids regularly, can start with ice chips or sips.      Take over the counter Imodium as directed/ as needed for diarrhea.    Take probiotics or yogurt to replace lost gut collette from GI issues.     If you were given Zofran, please wait 15-30 minutes after taking it to attempt fluid or food consumption.     Watch for any increase pain, fever, localized pain to right lower abdomen or continued vomiting or diarrhea. Follow up in ER for these symptoms.          Dysuria     Painful urination (dysuria) is often caused by a problem in the urinary tract.   Dysuria is pain felt during urination. It is often described as a  "burning. Learn more about this problem and how it can be treated.  What causes dysuria?  Possible causes include:  · Infection with a bacteria or virus such as a urinary tract infection (UTI or a sexually transmitted infection (STI)  · Sensitivity or allergy to chemicals such as those found in lotions and other products  · Prostate or bladder problems  · Radiation therapy to the pelvic area  How is dysuria diagnosed?  Your healthcare provider will examine you. He or she will ask about your symptoms and health. After talking with you and doing a physical exam, your healthcare provider may know what is causing your dysuria. He or she will usually request  a sample of your urine. Tests of your urine, or a "urinalysis," are done. A urinalysis may include:  · Looking at the urine sample (visual exam)  · Checking for substances (chemical exam)  · Looking at a small amount under a microscope (microscopic exam)  Some parts of the urinalysis may be done in the provider's office and some in a lab. And, the urine sample may be checked for bacteria and yeast (urine culture). Your healthcare provider will tell you more about these tests if they are needed.  How is dysuria treated?  Treatment depends on the cause. If you have a bacterial infection, you may need antibiotics. You may be given medicines to make it easier for you to urinate and help relieve pain. Your healthcare provider can tell you more about your treatment options. Untreated, symptoms may get worse.  When to call your healthcare provider  Call the healthcare provider right away if you have any of the following:  · Fever of 100.4°F (38°C) or higher   · No improvement after three days of treatment  · Trouble urinating because of pain  · New or increased discharge from the vagina or penis  · Rash or joint pain  · Increased back or abdominal pain  · Enlarged painful lymph nodes (lumps) in the groin   Date Last Reviewed: 1/1/2017  © 2473-3159 [employer] " [address] [place] [address]. All rights reserved. This information is not intended as a substitute for professional medical care. Always follow your healthcare professional's instructions.

## 2018-12-10 NOTE — PROGRESS NOTES
"Subjective:       Patient ID: Hillary Fountain is a 33 y.o. female.    Vitals:  height is 5' 2" (1.575 m) and weight is 67.1 kg (148 lb). Her respiration is 18 and oxygen saturation is 98%.     Chief Complaint: Dysuria    3 weeks ago had flank pain and attributed it to her menses as it came on later.       Dysuria    This is a new problem. The current episode started today. The problem has been gradually worsening. The quality of the pain is described as aching. The pain is at a severity of 6/10. The pain is moderate. There has been no fever. She is sexually active. There is no history of pyelonephritis. Associated symptoms include flank pain, hesitancy, nausea and urgency. Pertinent negatives include no chills, discharge, frequency, hematuria, possible pregnancy, vomiting, bubble bath use, constipation or rash. Associated symptoms comments: Abdominal pain  . She has tried nothing for the symptoms.       Constitution: Negative for chills and fever.   Neck: Negative for painful lymph nodes.   Gastrointestinal: Positive for nausea. Negative for abdominal pain, vomiting and constipation.   Genitourinary: Positive for dysuria, urgency and flank pain. Negative for frequency, urine decreased, hematuria, history of kidney stones, painful menstruation, irregular menstruation, missed menses, heavy menstrual bleeding, ovarian cysts, genital trauma, vaginal pain, vaginal discharge, vaginal bleeding, vaginal odor, painful intercourse, genital sore, painful ejaculation and pelvic pain.   Musculoskeletal: Negative for back pain.   Skin: Negative for rash and lesion.   Hematologic/Lymphatic: Negative for swollen lymph nodes.       Objective:      Physical Exam   Constitutional: She is oriented to person, place, and time. She appears well-developed and well-nourished.   HENT:   Head: Normocephalic and atraumatic.   Right Ear: External ear normal.   Left Ear: External ear normal.   Nose: Nose normal.   Mouth/Throat: Mucous " membranes are normal.   Eyes: Conjunctivae and lids are normal.   Neck: Trachea normal and full passive range of motion without pain. Neck supple.   Cardiovascular: Normal rate, regular rhythm and normal heart sounds.   Pulmonary/Chest: Effort normal and breath sounds normal. No respiratory distress.   Abdominal: Soft. Normal appearance and bowel sounds are normal. She exhibits no distension, no abdominal bruit, no pulsatile midline mass and no mass. There is no hepatosplenomegaly, splenomegaly or hepatomegaly. There is no tenderness. There is no rigidity, no rebound, no guarding, no CVA tenderness, no tenderness at McBurney's point and negative Pantoja's sign.   Musculoskeletal: Normal range of motion. She exhibits no edema.   Neurological: She is alert and oriented to person, place, and time. She has normal strength.   Skin: Skin is warm, dry and intact. She is not diaphoretic. No pallor.   Psychiatric: She has a normal mood and affect. Her speech is normal and behavior is normal. Judgment and thought content normal. Cognition and memory are normal.   Nursing note and vitals reviewed.      Assessment:       1. Dysuria        Plan:      recently had vasectomy.     Results for orders placed or performed in visit on 12/10/18   POCT Urinalysis, Dipstick, Automated, W/O Scope   Result Value Ref Range    POC Blood, Urine Negative Negative    POC Bilirubin, Urine Negative Negative    POC Urobilinogen, Urine Normal 0.1 - 1.1    POC Ketones, Urine Negative Negative    POC Protein, Urine Negative Negative    POC Nitrates, Urine Negative Negative    POC Glucose, Urine Negative Negative    pH, UA 5.5 5 - 8    POC Specific Gravity, Urine 1.015 1.003 - 1.029    POC Leukocytes, Urine Negative Negative       Dysuria  -     POCT Urinalysis, Dipstick, Automated, W/O Scope      Patient Instructions     Please follow up with your Primary care provider within 2-5 days if your signs and symptoms have not resolved or worsen.      If your condition worsens or fails to improve we recommend that you receive another evaluation at the emergency room immediately or contact your primary medical clinic to discuss your concerns.    You must understand that you have received an Urgent Care treatment only and that you may be released before all of your medical problems are known or treated.   You, the patient, will arrange for follow up care as instructed.     You have been given an antibiotic to treat your condition today.  Please complete the antibiotic as directed on the bottle.     As with any antibiotics, use probiotics and/or high culture yogurt about 2 hours apart from the antibiotic and about 1 week after the antibiotic to replace the gut collette lost with antibiotic use.      If you are female and on BCP use additional methods to prevent pregnancy while on antibiotics and for one cycle after.     Nausea/Vomiting/Diarrhea    Increase fluids and advance your diet as tolerated.      Start with liquids, soft foods, and progress into regular diet.  Please drink Pedialyte or use Pedialyte ice pops for electrolyte replacement daily as needed for fluid loss from diarrhea or vomiting.      Keep hydrated by drinking fluids regularly, can start with ice chips or sips.      Take over the counter Imodium as directed/ as needed for diarrhea.    Take probiotics or yogurt to replace lost gut collette from GI issues.     If you were given Zofran, please wait 15-30 minutes after taking it to attempt fluid or food consumption.     Watch for any increase pain, fever, localized pain to right lower abdomen or continued vomiting or diarrhea. Follow up in ER for these symptoms.          Dysuria     Painful urination (dysuria) is often caused by a problem in the urinary tract.   Dysuria is pain felt during urination. It is often described as a burning. Learn more about this problem and how it can be treated.  What causes dysuria?  Possible causes include:  · Infection  "with a bacteria or virus such as a urinary tract infection (UTI or a sexually transmitted infection (STI)  · Sensitivity or allergy to chemicals such as those found in lotions and other products  · Prostate or bladder problems  · Radiation therapy to the pelvic area  How is dysuria diagnosed?  Your healthcare provider will examine you. He or she will ask about your symptoms and health. After talking with you and doing a physical exam, your healthcare provider may know what is causing your dysuria. He or she will usually request  a sample of your urine. Tests of your urine, or a "urinalysis," are done. A urinalysis may include:  · Looking at the urine sample (visual exam)  · Checking for substances (chemical exam)  · Looking at a small amount under a microscope (microscopic exam)  Some parts of the urinalysis may be done in the provider's office and some in a lab. And, the urine sample may be checked for bacteria and yeast (urine culture). Your healthcare provider will tell you more about these tests if they are needed.  How is dysuria treated?  Treatment depends on the cause. If you have a bacterial infection, you may need antibiotics. You may be given medicines to make it easier for you to urinate and help relieve pain. Your healthcare provider can tell you more about your treatment options. Untreated, symptoms may get worse.  When to call your healthcare provider  Call the healthcare provider right away if you have any of the following:  · Fever of 100.4°F (38°C) or higher   · No improvement after three days of treatment  · Trouble urinating because of pain  · New or increased discharge from the vagina or penis  · Rash or joint pain  · Increased back or abdominal pain  · Enlarged painful lymph nodes (lumps) in the groin   Date Last Reviewed: 1/1/2017  © 8359-4311 [employer]. [address] [place] [address], [place] [address]. All rights reserved. This information is not intended as a substitute for " professional medical care. Always follow your healthcare professional's instructions.

## 2018-12-12 ENCOUNTER — TELEPHONE (OUTPATIENT)
Dept: OBSTETRICS AND GYNECOLOGY | Facility: CLINIC | Age: 33
End: 2018-12-12

## 2018-12-12 NOTE — TELEPHONE ENCOUNTER
Dr Salmeron pt calling, pt went to urgent care on Monday and still in pain and wants to speak to the nurse. Pt # [phone]

## 2018-12-12 NOTE — TELEPHONE ENCOUNTER
Pt called Monday when we were in a meeting with flank pain and nausea so she went to [facility].  UA was negative but they are running a culture.  Started her on Macrobid, pyridium and zofran.  Nausea has resolved but still has flank pain.  She feels like she is retaining urine, has to strain to empty bladder.  Denies dysuria, urgency and frequency.  She was told it wasn't a kidney stone because she didn't have blood in urine.  All results are in Epic.  Recommended she go to the ER as she probably needs to r/o kidney stone with an US.

## 2018-12-15 ENCOUNTER — TELEPHONE (OUTPATIENT)
Dept: URGENT CARE | Facility: CLINIC | Age: 33
End: 2018-12-15

## 2018-12-15 LAB
BACTERIA UR CULT: ABNORMAL
BACTERIA UR CULT: ABNORMAL
OTHER ANTIBIOTIC SUSC ISLT: ABNORMAL

## 2018-12-15 RX ORDER — DOXYCYCLINE 100 MG/1
100 CAPSULE ORAL EVERY 12 HOURS
Qty: 20 CAPSULE | Refills: 0 | Status: SHIPPED | OUTPATIENT
Start: 2018-12-15 | End: 2018-12-25

## 2018-12-15 RX ORDER — FLUCONAZOLE 150 MG/1
150 TABLET ORAL DAILY
Qty: 1 TABLET | Refills: 0 | Status: SHIPPED | OUTPATIENT
Start: 2018-12-15 | End: 2018-12-16

## 2018-12-15 NOTE — TELEPHONE ENCOUNTER
Patient stated it is better, but noted to have some joint pain in her lower legs following starting the Cipro.  Concerned for side effects, so patient changed to Doxy which is sensitive to the bacteria noted in chart.  Advised to follow up with Urology as advised by ER physician.  Also, sent Diflucan for antibiotic changes.  Verbalized understanding.

## 2019-03-19 ENCOUNTER — OFFICE VISIT (OUTPATIENT)
Dept: UROLOGY | Facility: CLINIC | Age: 34
End: 2019-03-19
Payer: COMMERCIAL

## 2019-03-19 ENCOUNTER — HOSPITAL ENCOUNTER (EMERGENCY)
Facility: HOSPITAL | Age: 34
Discharge: LEFT BEFORE DISCHARGE | End: 2019-03-19
Payer: COMMERCIAL

## 2019-03-19 VITALS
DIASTOLIC BLOOD PRESSURE: 60 MMHG | RESPIRATION RATE: 18 BRPM | OXYGEN SATURATION: 97 % | TEMPERATURE: 99 F | BODY MASS INDEX: 28.34 KG/M2 | SYSTOLIC BLOOD PRESSURE: 124 MMHG | HEART RATE: 74 BPM | HEIGHT: 62 IN | WEIGHT: 154 LBS

## 2019-03-19 VITALS
WEIGHT: 152 LBS | DIASTOLIC BLOOD PRESSURE: 70 MMHG | BODY MASS INDEX: 27.97 KG/M2 | RESPIRATION RATE: 15 BRPM | HEIGHT: 62 IN | HEART RATE: 74 BPM | SYSTOLIC BLOOD PRESSURE: 114 MMHG

## 2019-03-19 DIAGNOSIS — N20.0 KIDNEY STONES: Primary | ICD-10-CM

## 2019-03-19 LAB
BILIRUB UR QL STRIP: NEGATIVE
CLARITY UR REFRACT.AUTO: CLEAR
COLOR UR AUTO: NORMAL
GLUCOSE UR QL STRIP: NEGATIVE
HGB UR QL STRIP: NEGATIVE
KETONES UR QL STRIP: NEGATIVE
LEUKOCYTE ESTERASE UR QL STRIP: NEGATIVE
NITRITE UR QL STRIP: NEGATIVE
PH UR STRIP: 8 [PH] (ref 5–8)
PROT UR QL STRIP: NEGATIVE
SP GR UR STRIP: 1 (ref 1–1.03)
URN SPEC COLLECT METH UR: NORMAL

## 2019-03-19 PROCEDURE — 99203 PR OFFICE/OUTPT VISIT, NEW, LEVL III, 30-44 MIN: ICD-10-PCS | Mod: S$GLB,,, | Performed by: UROLOGY

## 2019-03-19 PROCEDURE — 99499 NO LOS: ICD-10-PCS | Mod: ,,, | Performed by: EMERGENCY MEDICINE

## 2019-03-19 PROCEDURE — 3008F BODY MASS INDEX DOCD: CPT | Mod: CPTII,S$GLB,, | Performed by: UROLOGY

## 2019-03-19 PROCEDURE — 81003 URINALYSIS AUTO W/O SCOPE: CPT

## 2019-03-19 PROCEDURE — 99999 PR PBB SHADOW E&M-EST. PATIENT-LVL III: ICD-10-PCS | Mod: PBBFAC,,, | Performed by: UROLOGY

## 2019-03-19 PROCEDURE — 99999 PR PBB SHADOW E&M-EST. PATIENT-LVL III: CPT | Mod: PBBFAC,,, | Performed by: UROLOGY

## 2019-03-19 PROCEDURE — 99203 OFFICE O/P NEW LOW 30 MIN: CPT | Mod: S$GLB,,, | Performed by: UROLOGY

## 2019-03-19 PROCEDURE — 99900041 HC LEFT WITHOUT BEING SEEN- EMERGENCY

## 2019-03-19 PROCEDURE — 3008F PR BODY MASS INDEX (BMI) DOCUMENTED: ICD-10-PCS | Mod: CPTII,S$GLB,, | Performed by: UROLOGY

## 2019-03-19 PROCEDURE — 99499 UNLISTED E&M SERVICE: CPT | Mod: ,,, | Performed by: EMERGENCY MEDICINE

## 2019-03-19 RX ORDER — DICLOFENAC SODIUM 50 MG/1
50 TABLET, DELAYED RELEASE ORAL 2 TIMES DAILY
Qty: 20 TABLET | Refills: 0 | Status: SHIPPED | OUTPATIENT
Start: 2019-03-19 | End: 2019-03-29

## 2019-03-19 NOTE — ED NOTES
Patient states she cannot sit anymore. Patient told she was next to be seen, encouraged to wait to be seen by a physician. Patient adamant about leaving. Patient ambulated out of ED without difficulty.

## 2019-03-19 NOTE — PROGRESS NOTES
"Subjective:      Patient ID: Hillary Fountain is a 33 y.o. female.    Chief Complaint: Right Kidney Pain (right flank pain for the past 4 weeks - no gross hematuria)    Patient is a 33 y.o. female who is new to our clinic and referred by the ED, Dr. Dawkins for evaluation of flank pain.     HPI    Urolithiasis  Patient complains of right flank pain without radiation to the abdomen. Onset of symptoms was abrupt starting today but she has some slight lower back pain on the right side.  Pain was severe today.  Rated as a 4/10 today, but the chronic pain is described as a 2/10. Patient describes the pain as sharp, continuous and rated as 4 / 10. The patient has had nausea and no vomiting and no diaphoresis. There has been no fever or chills. The patient is not complaining of dysuria or frequency. Risk factors for urolithiasis: history of stone disease, possible lack of hydration.    She is lactose intolerant---no significant dairy intake.  Eats fruits and meat---primarily chicken with "most meals".     Has passed kidney stones in the past---no surgeries for kidney stones.              Review of Systems   All other systems reviewed and negative except pertinent positives noted in HPI.    Objective:     Physical Exam   Constitutional: She is oriented to person, place, and time. She appears well-developed and well-nourished. She is cooperative.  Non-toxic appearance.   HENT:   Head: Normocephalic.   Cardiovascular: Normal rate, intact distal pulses and normal pulses.    Pulmonary/Chest: Effort normal. No accessory muscle usage. No tachypnea. No respiratory distress.   Abdominal: Soft. Normal appearance. She exhibits no distension, no fluid wave, no ascites and no mass. There is no tenderness. There is no rebound and no CVA tenderness. No hernia.       Liver/spleen non-palpable   Musculoskeletal: Normal range of motion.   Neurological: She is alert and oriented to person, place, and time. She has normal strength.   Skin: No " bruising and no rash noted.     Psychiatric: She has a normal mood and affect. Her speech is normal and behavior is normal. Thought content normal.     Assessment:     1. Kidney stones      Plan:     1. Kidney stones        No orders of the defined types were placed in this encounter.    1. Kidney stones:  -General risk factors for kidney stones and the conservative measures to prevent kidney stones in the future were discussed with the patient in detail.  The patient was encouraged to drink 2-3 liters of water a day, limit iced tea and jess as well as foods high in oxalate.  They were cautioned to try to limit salt and red meat intake.  We also discussed adding citrate to the diet with the addition of edgar or lemon juice to their water or alternatively with crystal light.   -renal US/KUB---will call with results.   -Urine dip: reviewed from earlier today and negative  -diclofenac prn pain

## 2019-03-21 ENCOUNTER — PATIENT MESSAGE (OUTPATIENT)
Dept: UROLOGY | Facility: CLINIC | Age: 34
End: 2019-03-21

## 2019-03-21 RX ORDER — ONDANSETRON 4 MG/1
4 TABLET, ORALLY DISINTEGRATING ORAL EVERY 8 HOURS PRN
Qty: 15 TABLET | Refills: 0 | Status: SHIPPED | OUTPATIENT
Start: 2019-03-21 | End: 2019-03-28

## 2019-03-25 ENCOUNTER — TELEPHONE (OUTPATIENT)
Dept: UROLOGY | Facility: CLINIC | Age: 34
End: 2019-03-25

## 2019-03-25 DIAGNOSIS — N20.0 NEPHROLITHIASIS: Primary | ICD-10-CM

## 2019-04-03 DIAGNOSIS — N20.0 KIDNEY STONES: Primary | ICD-10-CM

## 2019-04-03 NOTE — PROGRESS NOTES
Patient cancelled her surgery.  She needs a renal US in 1 month and f/u after.  Please call patient and arrange this.  Thank you.

## 2019-05-13 ENCOUNTER — OFFICE VISIT (OUTPATIENT)
Dept: UROLOGY | Facility: CLINIC | Age: 34
End: 2019-05-13
Payer: COMMERCIAL

## 2019-05-13 ENCOUNTER — HOSPITAL ENCOUNTER (OUTPATIENT)
Dept: RADIOLOGY | Facility: HOSPITAL | Age: 34
Discharge: HOME | End: 2019-05-13
Attending: UROLOGY
Payer: COMMERCIAL

## 2019-05-13 VITALS
HEART RATE: 79 BPM | WEIGHT: 152 LBS | DIASTOLIC BLOOD PRESSURE: 71 MMHG | BODY MASS INDEX: 27.97 KG/M2 | HEIGHT: 62 IN | SYSTOLIC BLOOD PRESSURE: 120 MMHG

## 2019-05-13 DIAGNOSIS — R10.9 FLANK PAIN: ICD-10-CM

## 2019-05-13 DIAGNOSIS — N20.0 KIDNEY STONES: Primary | ICD-10-CM

## 2019-05-13 PROCEDURE — 99999 PR PBB SHADOW E&M-EST. PATIENT-LVL III: CPT | Mod: PBBFAC,,, | Performed by: UROLOGY

## 2019-05-13 PROCEDURE — 99214 PR OFFICE/OUTPT VISIT, EST, LEVL IV, 30-39 MIN: ICD-10-PCS | Mod: S$GLB,,, | Performed by: UROLOGY

## 2019-05-13 PROCEDURE — 99214 OFFICE O/P EST MOD 30 MIN: CPT | Mod: S$GLB,,, | Performed by: UROLOGY

## 2019-05-13 PROCEDURE — 99999 PR PBB SHADOW E&M-EST. PATIENT-LVL III: ICD-10-PCS | Mod: PBBFAC,,, | Performed by: UROLOGY

## 2019-05-13 PROCEDURE — 74176 CT RENAL STONE STUDY ABD PELVIS WO: ICD-10-PCS | Mod: 26,,, | Performed by: RADIOLOGY

## 2019-05-13 PROCEDURE — 3008F PR BODY MASS INDEX (BMI) DOCUMENTED: ICD-10-PCS | Mod: CPTII,S$GLB,, | Performed by: UROLOGY

## 2019-05-13 PROCEDURE — 74176 CT ABD & PELVIS W/O CONTRAST: CPT | Mod: TC

## 2019-05-13 PROCEDURE — 74176 CT ABD & PELVIS W/O CONTRAST: CPT | Mod: 26,,, | Performed by: RADIOLOGY

## 2019-05-13 PROCEDURE — 3008F BODY MASS INDEX DOCD: CPT | Mod: CPTII,S$GLB,, | Performed by: UROLOGY

## 2019-05-13 NOTE — H&P (VIEW-ONLY)
"Subjective:      Patient ID: Hillary Fountain is a 33 y.o. female.    Chief Complaint: Follow-up    Patient is a 33 y.o. female who is established to our clinic and referred by the ED, Dr. Dawkins for evaluation of flank pain.     HPI  She previously was seen for acute flank pain.  She had a small distal right ureteral stone seen on CT scan in 1/2019.  She is unsure if she has passed the stone but continues to have intermittent right flank pain.  Worse during urination.  She does not recall having reflux of urine as a child.  NO fever.  No dysuria. No gross hematuria.      She is lactose intolerant---no significant dairy intake.  Eats fruits and meat---primarily chicken with "most meals".     Has passed kidney stones in the past---no surgeries for kidney stones.      Review of Systems   All other systems reviewed and negative except pertinent positives noted in HPI.    Objective:     Physical Exam   Constitutional: She is oriented to person, place, and time. She appears well-developed and well-nourished. No distress.   HENT:   Head: Normocephalic and atraumatic.   Eyes: No scleral icterus.   Neck: No tracheal deviation present.   Pulmonary/Chest: Effort normal. No respiratory distress.   Neurological: She is alert and oriented to person, place, and time.   Psychiatric: She has a normal mood and affect. Her behavior is normal. Judgment and thought content normal.     Assessment:     1. Kidney stones    2. Flank pain      Plan:     1. Kidney stones    2. Flank pain        Orders Placed This Encounter   Procedures    CT Renal Stone Study ABD Pelvis WO     Standing Status:   Future     Number of Occurrences:   1     Standing Expiration Date:   5/12/2020     1. Kidney stones:  -General risk factors for kidney stones and the conservative measures to prevent kidney stones in the future were discussed with the patient in detail.  The patient was encouraged to drink 2-3 liters of water a day, limit iced tea and jess as well " as foods high in oxalate.  They were cautioned to try to limit salt and red meat intake.  We also discussed adding citrate to the diet with the addition of edgar or lemon juice to their water or alternatively with crystal light.   -renal US reviewed today.    -plan for CT RSS---if no stone/hydronephrosis, will likely get a VCUG to rule out VUR

## 2019-05-14 ENCOUNTER — TELEPHONE (OUTPATIENT)
Dept: UROLOGY | Facility: CLINIC | Age: 34
End: 2019-05-14

## 2019-05-14 DIAGNOSIS — N20.0 KIDNEY STONES: Primary | ICD-10-CM

## 2019-05-24 ENCOUNTER — HOSPITAL ENCOUNTER (EMERGENCY)
Facility: HOSPITAL | Age: 34
Discharge: HOME | End: 2019-05-24
Attending: EMERGENCY MEDICINE
Payer: COMMERCIAL

## 2019-05-24 ENCOUNTER — PATIENT MESSAGE (OUTPATIENT)
Dept: SURGERY | Facility: HOSPITAL | Age: 34
End: 2019-05-24

## 2019-05-24 VITALS
SYSTOLIC BLOOD PRESSURE: 100 MMHG | HEIGHT: 62 IN | HEART RATE: 62 BPM | BODY MASS INDEX: 25.76 KG/M2 | OXYGEN SATURATION: 100 % | DIASTOLIC BLOOD PRESSURE: 66 MMHG | WEIGHT: 140 LBS | TEMPERATURE: 98 F | RESPIRATION RATE: 20 BRPM

## 2019-05-24 DIAGNOSIS — R31.9 HEMATURIA, UNSPECIFIED TYPE: ICD-10-CM

## 2019-05-24 DIAGNOSIS — R10.9 RIGHT FLANK PAIN: Primary | ICD-10-CM

## 2019-05-24 LAB
ALBUMIN SERPL BCP-MCNC: 4.1 G/DL (ref 3.5–5.2)
ALP SERPL-CCNC: 64 U/L (ref 55–135)
ALT SERPL W/O P-5'-P-CCNC: 20 U/L (ref 10–44)
ANION GAP SERPL CALC-SCNC: 8 MMOL/L (ref 8–16)
AST SERPL-CCNC: 22 U/L (ref 10–40)
B-HCG UR QL: NEGATIVE
BACTERIA #/AREA URNS AUTO: ABNORMAL /HPF
BASOPHILS # BLD AUTO: 0.08 K/UL (ref 0–0.2)
BASOPHILS NFR BLD: 0.8 % (ref 0–1.9)
BILIRUB SERPL-MCNC: 0.3 MG/DL (ref 0.1–1)
BILIRUB UR QL STRIP: NEGATIVE
BUN SERPL-MCNC: 10 MG/DL (ref 6–20)
CALCIUM SERPL-MCNC: 9.3 MG/DL (ref 8.7–10.5)
CHLORIDE SERPL-SCNC: 106 MMOL/L (ref 95–110)
CLARITY UR REFRACT.AUTO: ABNORMAL
CO2 SERPL-SCNC: 27 MMOL/L (ref 23–29)
COLOR UR AUTO: ABNORMAL
CREAT SERPL-MCNC: 0.7 MG/DL (ref 0.5–1.4)
CTP QC/QA: YES
DIFFERENTIAL METHOD: ABNORMAL
EOSINOPHIL # BLD AUTO: 0.1 K/UL (ref 0–0.5)
EOSINOPHIL NFR BLD: 0.5 % (ref 0–8)
ERYTHROCYTE [DISTWIDTH] IN BLOOD BY AUTOMATED COUNT: 11.9 % (ref 11.5–14.5)
EST. GFR  (AFRICAN AMERICAN): >60 ML/MIN/1.73 M^2
EST. GFR  (NON AFRICAN AMERICAN): >60 ML/MIN/1.73 M^2
GLUCOSE SERPL-MCNC: 103 MG/DL (ref 70–110)
GLUCOSE UR QL STRIP: NEGATIVE
HCT VFR BLD AUTO: 37.4 % (ref 37–48.5)
HGB BLD-MCNC: 12.5 G/DL (ref 12–16)
HGB UR QL STRIP: ABNORMAL
HYALINE CASTS UR QL AUTO: 0 /LPF
IMM GRANULOCYTES # BLD AUTO: 0.02 K/UL (ref 0–0.04)
IMM GRANULOCYTES NFR BLD AUTO: 0.2 % (ref 0–0.5)
KETONES UR QL STRIP: ABNORMAL
LEUKOCYTE ESTERASE UR QL STRIP: ABNORMAL
LYMPHOCYTES # BLD AUTO: 1.3 K/UL (ref 1–4.8)
LYMPHOCYTES NFR BLD: 13.4 % (ref 18–48)
MCH RBC QN AUTO: 31.6 PG (ref 27–31)
MCHC RBC AUTO-ENTMCNC: 33.4 G/DL (ref 32–36)
MCV RBC AUTO: 94 FL (ref 82–98)
MICROSCOPIC COMMENT: ABNORMAL
MONOCYTES # BLD AUTO: 0.5 K/UL (ref 0.3–1)
MONOCYTES NFR BLD: 5.2 % (ref 4–15)
NEUTROPHILS # BLD AUTO: 7.8 K/UL (ref 1.8–7.7)
NEUTROPHILS NFR BLD: 79.9 % (ref 38–73)
NITRITE UR QL STRIP: NEGATIVE
NRBC BLD-RTO: 0 /100 WBC
PH UR STRIP: 6 [PH] (ref 5–8)
PLATELET # BLD AUTO: 242 K/UL (ref 150–350)
PMV BLD AUTO: 10.7 FL (ref 9.2–12.9)
POTASSIUM SERPL-SCNC: 3.7 MMOL/L (ref 3.5–5.1)
PROT SERPL-MCNC: 7.6 G/DL (ref 6–8.4)
PROT UR QL STRIP: ABNORMAL
RBC # BLD AUTO: 3.96 M/UL (ref 4–5.4)
RBC #/AREA URNS AUTO: >100 /HPF (ref 0–4)
SODIUM SERPL-SCNC: 141 MMOL/L (ref 136–145)
SP GR UR STRIP: 1.02 (ref 1–1.03)
SQUAMOUS #/AREA URNS AUTO: 0 /HPF
URN SPEC COLLECT METH UR: ABNORMAL
WBC # BLD AUTO: 9.7 K/UL (ref 3.9–12.7)
WBC #/AREA URNS AUTO: 0 /HPF (ref 0–5)

## 2019-05-24 PROCEDURE — 96374 THER/PROPH/DIAG INJ IV PUSH: CPT

## 2019-05-24 PROCEDURE — 99285 PR EMERGENCY DEPT VISIT,LEVEL V: ICD-10-PCS | Mod: ,,, | Performed by: PHYSICIAN ASSISTANT

## 2019-05-24 PROCEDURE — 63600175 PHARM REV CODE 636 W HCPCS: Performed by: PHYSICIAN ASSISTANT

## 2019-05-24 PROCEDURE — 80053 COMPREHEN METABOLIC PANEL: CPT

## 2019-05-24 PROCEDURE — 81001 URINALYSIS AUTO W/SCOPE: CPT

## 2019-05-24 PROCEDURE — 85025 COMPLETE CBC W/AUTO DIFF WBC: CPT

## 2019-05-24 PROCEDURE — 81025 URINE PREGNANCY TEST: CPT | Performed by: EMERGENCY MEDICINE

## 2019-05-24 PROCEDURE — 96375 TX/PRO/DX INJ NEW DRUG ADDON: CPT

## 2019-05-24 PROCEDURE — 99284 EMERGENCY DEPT VISIT MOD MDM: CPT | Mod: 25

## 2019-05-24 PROCEDURE — 25000003 PHARM REV CODE 250: Performed by: PHYSICIAN ASSISTANT

## 2019-05-24 PROCEDURE — 99285 EMERGENCY DEPT VISIT HI MDM: CPT | Mod: ,,, | Performed by: PHYSICIAN ASSISTANT

## 2019-05-24 RX ORDER — NAPROXEN 500 MG/1
500 TABLET ORAL 2 TIMES DAILY WITH MEALS
Qty: 20 TABLET | Refills: 0 | Status: ON HOLD | OUTPATIENT
Start: 2019-05-24 | End: 2019-06-07 | Stop reason: HOSPADM

## 2019-05-24 RX ORDER — ONDANSETRON 4 MG/1
4 TABLET, ORALLY DISINTEGRATING ORAL EVERY 8 HOURS PRN
Qty: 12 TABLET | Refills: 0 | Status: SHIPPED | OUTPATIENT
Start: 2019-05-24 | End: 2019-06-18

## 2019-05-24 RX ORDER — ONDANSETRON 2 MG/ML
4 INJECTION INTRAMUSCULAR; INTRAVENOUS
Status: COMPLETED | OUTPATIENT
Start: 2019-05-24 | End: 2019-05-24

## 2019-05-24 RX ORDER — KETOROLAC TROMETHAMINE 30 MG/ML
10 INJECTION, SOLUTION INTRAMUSCULAR; INTRAVENOUS
Status: COMPLETED | OUTPATIENT
Start: 2019-05-24 | End: 2019-05-24

## 2019-05-24 RX ADMIN — ONDANSETRON 4 MG: 2 INJECTION INTRAMUSCULAR; INTRAVENOUS at 12:05

## 2019-05-24 RX ADMIN — KETOROLAC TROMETHAMINE 10 MG: 30 INJECTION, SOLUTION INTRAMUSCULAR at 12:05

## 2019-05-24 RX ADMIN — SODIUM CHLORIDE 1000 ML: 0.9 INJECTION, SOLUTION INTRAVENOUS at 12:05

## 2019-05-24 NOTE — ED TRIAGE NOTES
Pt to the ER with complaints of right flank pain since yesterday and vomiting that started today.  Pt with a hx of kidney stones.  Pt also complains of tingling in both hands.

## 2019-05-24 NOTE — ED PROVIDER NOTES
Encounter Date: 2019    SCRIBE #1 NOTE: I, Son Lyudmila, am scribing for, and in the presence of,  Dr. Rollins. I have scribed the following portions of the note -       History     Chief Complaint   Patient presents with    Flank Pain     vomiting, hx kidney stones     33-year-old female with history of nephrolithiasis followed by Urology, Dr. Sharma presents to the ED complaining of right flank pain. She developed flank pain yesterday afternoon that she describes as intermittent dull/stinging pain. Today, she became lightheaded and had multiple episodes of emesis.  She reports associated dysuria, decreased urination, hematuria, chills, headache.  She is currently on her menstrual cycle.  She took Advil last night for symptoms with relief of her pain. She denies any history of ureteral stent placement or lithotripsy.  She is not currently on any antibiotics.  No trauma. She denies fever, chest pain, shortness of breath, URI symptoms. She denies tobacco use.    The history is provided by the patient.     Review of patient's allergies indicates:  No Known Allergies  Past Medical History:   Diagnosis Date    History of urinary calculi     Kidney infection     kidney stones aand kidney infection 2012/and 13    Kidney stones      Past Surgical History:   Procedure Laterality Date    BREAST AUGMENTATION       SECTION  2004    Boy     SECTION  2010    Boy     SECTION  2015    Boy     SECTION  2018    Girl    CHOLECYSTECTOMY      DELIVERY-CEASAREAN SECTION N/A 2015    Performed [facility]    DELIVERY- SECTION N/A 2018    Performed [facility] Salmeron [facility] at [facility] L[facility]    DILATION AND CURETTAGE OF UTERUS      TAB     Family History   Problem Relation Age of Onset    Diabetes Maternal Grandfather     Diabetes Father     Hypertension Father     Anemia Mother     Diabetes Paternal Grandfather      Dementia Paternal Grandmother     No Known Problems Maternal Grandmother     No Known Problems Sister     No Known Problems Sister     Breast cancer Neg Hx     Colon cancer Neg Hx     Ovarian cancer Neg Hx     Stroke Neg Hx     Cancer Neg Hx      Social History     Tobacco Use    Smoking status: Never Smoker    Smokeless tobacco: Never Used   Substance Use Topics    Alcohol use: No    Drug use: No     Review of Systems   Constitutional: Positive for chills. Negative for fever.   HENT: Positive for congestion (nasal). Negative for rhinorrhea and sore throat.    Eyes: Negative for photophobia and visual disturbance.   Respiratory: Negative for cough and shortness of breath.    Cardiovascular: Negative for chest pain.   Gastrointestinal: Positive for nausea and vomiting. Negative for abdominal pain, constipation and diarrhea.   Genitourinary: Positive for difficulty urinating, dysuria, flank pain, hematuria and vaginal bleeding. Negative for vaginal discharge.   Musculoskeletal: Negative for back pain, neck pain and neck stiffness.   Skin: Negative for rash and wound.   Neurological: Positive for light-headedness and headaches. Negative for dizziness, weakness and numbness.   Psychiatric/Behavioral: Negative for confusion.       Physical Exam     Initial Vitals [05/24/19 1057]   BP Pulse Resp Temp SpO2   125/62 78 18 98.2 °F (36.8 °C) 98 %      MAP       --         Physical Exam    Nursing note and vitals reviewed.  Constitutional: She appears well-developed and well-nourished. She is not diaphoretic. No distress.   HENT:   Head: Normocephalic and atraumatic.   Neck: Normal range of motion. Neck supple.   Cardiovascular: Normal rate, regular rhythm and normal heart sounds. Exam reveals no gallop and no friction rub.    No murmur heard.  Pulmonary/Chest: Breath sounds normal. She has no wheezes. She has no rhonchi. She has no rales.   Abdominal: Soft. Bowel sounds are normal. There is tenderness in the  suprapubic area. There is no rigidity, no rebound, no guarding, no CVA tenderness, no tenderness at McBurney's point and negative Pantoja's sign.   Musculoskeletal: Normal range of motion.   Neurological: She is alert and oriented to person, place, and time.   Skin: Skin is warm and dry. No rash noted. No erythema.   Psychiatric: She has a normal mood and affect.         ED Course   Procedures  Labs Reviewed   URINALYSIS, REFLEX TO URINE CULTURE - Abnormal; Notable for the following components:       Result Value    Color, UA Red (*)     Appearance, UA Cloudy (*)     Protein, UA 2+ (*)     Ketones, UA Trace (*)     Occult Blood UA 3+ (*)     Leukocytes, UA Trace (*)     All other components within normal limits    Narrative:     Preferred Collection Type->Urine, Clean Catch   CBC W/ AUTO DIFFERENTIAL - Abnormal; Notable for the following components:    RBC 3.96 (*)     Mean Corpuscular Hemoglobin 31.6 (*)     Gran # (ANC) 7.8 (*)     Gran% 79.9 (*)     Lymph% 13.4 (*)     All other components within normal limits   URINALYSIS MICROSCOPIC - Abnormal; Notable for the following components:    RBC, UA >100 (*)     Bacteria Few (*)     All other components within normal limits    Narrative:     Preferred Collection Type->Urine, Clean Catch   COMPREHENSIVE METABOLIC PANEL   POCT URINE PREGNANCY          Imaging Results          US Retroperitoneal Complete (Final result)  Result time 05/24/19 14:56:56    Final result by Josue Lennon MD (05/24/19 14:56:56)                 Impression:      Unremarkable sonographic evaluation of the kidneys and urinary bladder.    Electronically signed by resident: Ling Andrews  Date:    05/24/2019  Time:    14:43    Electronically signed by: Josue Lennon MD  Date:    05/24/2019  Time:    14:56             Narrative:    EXAMINATION:  US RETROPERITONEAL COMPLETE    CLINICAL HISTORY:  R flank pain, h/o kidney stones;    TECHNIQUE:  Ultrasound of the kidneys and urinary bladder was  performed including color flow and Doppler evaluation of the kidneys.    COMPARISON:  CT abdomen and pelvis 05/13/2019.    FINDINGS:  Right kidney: The right kidney measures 10.7 cm. No cortical thinning. No loss of corticomedullary distinction. Resistive index measures 0.69.  No mass. No renal stone. No hydronephrosis.    Left kidney: The left kidney measures 11.0 cm. No cortical thinning. No loss of corticomedullary distinction. Resistive index measures 0.68.  No mass. No renal stone. No hydronephrosis.    The bladder is partially distended at the time of scanning and has an unremarkable appearance.                                 Medical Decision Making:   History:   Old Medical Records: I decided to obtain old medical records.  Old Records Summarized: records from clinic visits.       <> Summary of Records: Followed by Urology, Dr Sharma. Scheduled for ureteroscopy 6/4/19  Clinical Tests:   Lab Tests: Ordered and Reviewed  Radiological Study: Ordered and Reviewed       APC / Resident Notes:   33-year-old female with history of nephrolithiasis followed by Urology, Dr. Sharma presents to the ED complaining of right flank pain. Vital signs stable. Abdomen is soft and tender in the suprapubic region.  No CVA tenderness. No rashes. Differential diagnosis includes but is not limited to UTI, pyelonephritis, nephrolithiasis, musculoskeletal pain, shingles prodrome.  Will obtain labs.  Discussed at length with patient retroperitoneal ultrasound versus CT renal stone.  She elected to have an ultrasound at this time.      No leukocytosis.  Creatinine normal.  UA shows hematuria but no signs of infection.  UPT negative.    Retroperitoneal ultrasound does not show any hydronephrosis.    I do not feel the patient needs any further labs or imaging at this time.  Stable for discharge.    She was discharged with prescriptions for zofran and naproxen.  She will follow up with her PCP and urology (Dr Sharma).  All of the patient's  questions were answered.  I reviewed the patient's chart, labs, and imaging and discussed the case with my supervising physician.          Scribe Attestation:   Scribe #1: I performed the above scribed service and the documentation accurately describes the services I performed. I attest to the accuracy of the note.    Attending Attestation:     Physician Attestation Statement for NP/PA:   I discussed this assessment and plan of this patient with the NP/PA, but I did not personally examine the patient. The face to face encounter was performed by the NP/PA.                     Clinical Impression:       ICD-10-CM ICD-9-CM   1. Right flank pain R10.9 789.09   2. Hematuria, unspecified type R31.9 599.70         Disposition:   Disposition: Discharged  Condition: Stable                        Breanna Boateng PA-C  05/24/19 2038       Geraldo Rollins MD  05/26/19 1929

## 2019-05-28 ENCOUNTER — ANESTHESIA EVENT (OUTPATIENT)
Dept: SURGERY | Facility: HOSPITAL | Age: 34
End: 2019-05-28
Payer: COMMERCIAL

## 2019-05-28 NOTE — PRE ADMISSION SCREENING
Anesthesia Assessment: Preoperative EQUATION    Planned Procedure: Procedure(s) (LRB):  URETEROSCOPY (Right)  LITHOTRIPSY, USING LASER (Right)  Stent, Ureteral (Right)  Requested Anesthesia Type:General  Surgeon: Vipul Sharma MD  Service: Urology  Known or anticipated Date of Surgery:6/7/2019    Surgeon notes: reviewed    Electronic QUestionnaire Assessment completed via nurse interview with patient.      NO AQ    Triage considerations:     The patient has no apparent active cardiac condition (No unstable coronary Syndrome such as severe unstable angina or recent [<1 month] myocardial infarction, decompensated CHF, severe valvular   disease or significant arrhythmia)    Previous anesthesia records:CSE, Epidural Anesthesia and Pt noted problems    Last PCP note: No PCP  Subspecialty notes: Urology    Other important co-morbidities: Kidney stones     Tests already available:  Available tests,  within 1 month , within [facility] .  5/24/2019 CMP, CBC            Instructions given. (See in Nurse's note)    Optimization:  Anesthesia Preop Clinic Assessment  Indicated: Not required for this procedure      Plan:    Testing:  None needed      Patient  has previously scheduled Medical Appointment:  Not at this time prior to surgery    Navigation:  Straight Line to surgery.                No tests, anesthesia preop clinic visit, or consult required.

## 2019-05-28 NOTE — ANESTHESIA PREPROCEDURE EVALUATION
Fallon Núñez, RN   Registered Nurse      Pre Admission Screening   Signed                     Anesthesia Assessment: Preoperative EQUATION     Planned Procedure: Procedure(s) (LRB):  URETEROSCOPY (Right)  LITHOTRIPSY, USING LASER (Right)  Stent, Ureteral (Right)  Requested Anesthesia Type:General  Surgeon: Vipul Sharma MD  Service: Urology  Known or anticipated Date of Surgery:2019     Surgeon notes: reviewed     Electronic QUestionnaire Assessment completed via nurse interview with patient.      NO AQ     Triage considerations:      The patient has no apparent active cardiac condition (No unstable coronary Syndrome such as severe unstable angina or recent [<1 month] myocardial infarction, decompensated CHF, severe valvular   disease or significant arrhythmia)     Previous anesthesia records:CSE, Epidural Anesthesia and Pt noted problems   2018  delivery    Last PCP note: No PCP  Subspecialty notes: Urology     Other important co-morbidities: Kidney stones     Tests already available:  Available tests,  within 1 month , within [facility] .  2019 CMP, CBC                            Instructions given. (See in Nurse's note)     Optimization:  Anesthesia Preop Clinic Assessment  Indicated: Not required for this procedure                Plan:    Testing:  None needed                           Patient  has previously scheduled Medical Appointment:  Not at this time prior to surgery     Navigation:    Straight Line to surgery.                            No tests, anesthesia preop clinic visit, or consult required.                                                                                                                        2019  Hillary Fountain is a 33 y.o., female.    Anesthesia Evaluation         Review of Systems  Anesthesia Hx:  Hx of Anesthetic complications Pt stated had Spinal with last  that did not work, was then given epidural and experienced Post dural  punture HA and swelling/edema to lower extremities. Has not had previous problem with GETA. History of prior surgery of interest to airway management or planning: Previous anesthesia: Epidural   2018  delivery with epidural.  Procedure performed at an [facility] Facility. Denies Family Hx of Anesthesia complications.  Personal Hx of Anesthesia complications  Post Dural Puncture Headache  Social:  Non-Smoker, Social Alcohol Use    Hematology/Oncology:     Oncology Normal   Hematology Comments: Hx anemia   EENT/Dental:EENT/Dental Normal   Cardiovascular:   Exercise tolerance: good  Denies Angina.  Functional Capacity good / => 4 METS    Pulmonary:  Pulmonary Normal  Denies Asthma.  Denies Shortness of breath.  Denies Recent URI.    Renal/:   renal calculi    Hepatic/GI:  Hepatic/GI Normal    Musculoskeletal:  Musculoskeletal Normal    Neurological:  Neurology Normal    Endocrine:  Endocrine Normal    Dermatological:  Skin Normal    Psych:  Psychiatric Normal           Physical Exam  General:  Well nourished    Airway/Jaw/Neck:  Airway Findings: Mouth Opening: Normal Tongue: Normal  General Airway Assessment: Adult, Average  Mallampati: II  TM Distance: Normal, at least 6 cm  Jaw/Neck Findings:  Neck ROM: Normal ROM      Dental:  Dental Findings: In tact   Chest/Lungs:  Chest/Lungs Findings: Normal Respiratory Rate, Clear to auscultation     Heart/Vascular:  Heart Findings: Rate: Normal  Rhythm: Regular Rhythm        Mental Status:  Mental Status Findings:  Alert and Oriented, Cooperative         Anesthesia Plan  Type of Anesthesia, risks & benefits discussed:  Anesthesia Type:  general  Patient's Preference:   Intra-op Monitoring Plan: standard ASA monitors  Intra-op Monitoring Plan Comments:   Post Op Pain Control Plan: multimodal analgesia, IV/PO Opioids PRN and per primary service following discharge from PACU  Post Op Pain Control Plan Comments:   Induction:   IV  Beta Blocker:  Patient is not  currently on a Beta-Blocker (No further documentation required).       Informed Consent: Patient understands risks and agrees with Anesthesia plan.  Questions answered. Anesthesia consent signed with patient.  ASA Score: 2     Day of Surgery Review of History & Physical:    H&P update referred to the surgeon.         Ready For Surgery From Anesthesia Perspective.

## 2019-05-28 NOTE — PRE-PROCEDURE INSTRUCTIONS
Preop instructions given and reviewed; instructed to hold vitamins, herbal supplements and NSAIDS one week prior to surgery;  Patient verbalized understanding.  Patient instructed to call POC with any questions or changes.

## 2019-06-07 ENCOUNTER — ANESTHESIA (OUTPATIENT)
Dept: SURGERY | Facility: HOSPITAL | Age: 34
End: 2019-06-07
Payer: COMMERCIAL

## 2019-06-07 ENCOUNTER — HOSPITAL ENCOUNTER (OUTPATIENT)
Facility: HOSPITAL | Age: 34
Discharge: HOME | End: 2019-06-07
Attending: UROLOGY | Admitting: UROLOGY
Payer: COMMERCIAL

## 2019-06-07 VITALS
SYSTOLIC BLOOD PRESSURE: 99 MMHG | HEART RATE: 61 BPM | WEIGHT: 145 LBS | DIASTOLIC BLOOD PRESSURE: 60 MMHG | RESPIRATION RATE: 15 BRPM | HEIGHT: 62 IN | OXYGEN SATURATION: 100 % | TEMPERATURE: 98 F | BODY MASS INDEX: 26.68 KG/M2

## 2019-06-07 DIAGNOSIS — R10.9 FLANK PAIN: Primary | ICD-10-CM

## 2019-06-07 DIAGNOSIS — N20.0 KIDNEY STONE: ICD-10-CM

## 2019-06-07 DIAGNOSIS — N20.1 URETERAL STONE: ICD-10-CM

## 2019-06-07 PROBLEM — Z34.91 ENCOUNTER FOR SUPERVISION OF NORMAL PREGNANCY IN FIRST TRIMESTER: Status: RESOLVED | Noted: 2017-06-15 | Resolved: 2019-06-07

## 2019-06-07 PROCEDURE — C1769 GUIDE WIRE: HCPCS | Performed by: UROLOGY

## 2019-06-07 PROCEDURE — 25500020 PHARM REV CODE 255: Performed by: UROLOGY

## 2019-06-07 PROCEDURE — 36000707: Performed by: UROLOGY

## 2019-06-07 PROCEDURE — D9220A PRA ANESTHESIA: ICD-10-PCS | Mod: ANES,,, | Performed by: ANESTHESIOLOGY

## 2019-06-07 PROCEDURE — 37000009 HC ANESTHESIA EA ADD 15 MINS: Performed by: UROLOGY

## 2019-06-07 PROCEDURE — 74420 UROGRAPHY RTRGR +-KUB: CPT | Mod: 26,,, | Performed by: UROLOGY

## 2019-06-07 PROCEDURE — 36000706: Performed by: UROLOGY

## 2019-06-07 PROCEDURE — 63600175 PHARM REV CODE 636 W HCPCS: Performed by: STUDENT IN AN ORGANIZED HEALTH CARE EDUCATION/TRAINING PROGRAM

## 2019-06-07 PROCEDURE — 63600175 PHARM REV CODE 636 W HCPCS: Performed by: NURSE ANESTHETIST, CERTIFIED REGISTERED

## 2019-06-07 PROCEDURE — 71000016 HC POSTOP RECOV ADDL HR: Performed by: UROLOGY

## 2019-06-07 PROCEDURE — 74420 PR  X-RAY RETROGRADE PYELOGRAM: ICD-10-PCS | Mod: 26,,, | Performed by: UROLOGY

## 2019-06-07 PROCEDURE — 27201423 OPTIME MED/SURG SUP & DEVICES STERILE SUPPLY: Performed by: UROLOGY

## 2019-06-07 PROCEDURE — 71000044 HC DOSC ROUTINE RECOVERY FIRST HOUR: Performed by: UROLOGY

## 2019-06-07 PROCEDURE — D9220A PRA ANESTHESIA: ICD-10-PCS | Mod: CRNA,,, | Performed by: NURSE ANESTHETIST, CERTIFIED REGISTERED

## 2019-06-07 PROCEDURE — D9220A PRA ANESTHESIA: Mod: ANES,,, | Performed by: ANESTHESIOLOGY

## 2019-06-07 PROCEDURE — 37000008 HC ANESTHESIA 1ST 15 MINUTES: Performed by: UROLOGY

## 2019-06-07 PROCEDURE — 71000015 HC POSTOP RECOV 1ST HR: Performed by: UROLOGY

## 2019-06-07 PROCEDURE — 76000 PR  FLUOROSCOPE EXAMINATION: ICD-10-PCS | Mod: 26,59,, | Performed by: UROLOGY

## 2019-06-07 PROCEDURE — 25000003 PHARM REV CODE 250: Performed by: STUDENT IN AN ORGANIZED HEALTH CARE EDUCATION/TRAINING PROGRAM

## 2019-06-07 PROCEDURE — 25000003 PHARM REV CODE 250

## 2019-06-07 PROCEDURE — 76000 FLUOROSCOPY <1 HR PHYS/QHP: CPT | Mod: 26,59,, | Performed by: UROLOGY

## 2019-06-07 PROCEDURE — C1758 CATHETER, URETERAL: HCPCS | Performed by: UROLOGY

## 2019-06-07 PROCEDURE — 52351 CYSTOURETERO & OR PYELOSCOPE: CPT | Mod: RT,,, | Performed by: UROLOGY

## 2019-06-07 PROCEDURE — D9220A PRA ANESTHESIA: Mod: CRNA,,, | Performed by: NURSE ANESTHETIST, CERTIFIED REGISTERED

## 2019-06-07 PROCEDURE — 52351 PR CYSTO/URETERO/PYELOSCOPY, DX: ICD-10-PCS | Mod: RT,,, | Performed by: UROLOGY

## 2019-06-07 RX ORDER — ONDANSETRON 2 MG/ML
4 INJECTION INTRAMUSCULAR; INTRAVENOUS ONCE AS NEEDED
Status: DISCONTINUED | OUTPATIENT
Start: 2019-06-07 | End: 2019-06-07 | Stop reason: HOSPADM

## 2019-06-07 RX ORDER — TAMSULOSIN HYDROCHLORIDE 0.4 MG/1
0.4 CAPSULE ORAL DAILY
Qty: 30 CAPSULE | Refills: 0 | Status: SHIPPED | OUTPATIENT
Start: 2019-06-07 | End: 2019-06-18

## 2019-06-07 RX ORDER — CEFTRIAXONE 1 G/1
1 INJECTION, POWDER, FOR SOLUTION INTRAMUSCULAR; INTRAVENOUS
Status: DISCONTINUED | OUTPATIENT
Start: 2019-06-07 | End: 2019-06-07

## 2019-06-07 RX ORDER — CEFAZOLIN SODIUM 1 G/3ML
2 INJECTION, POWDER, FOR SOLUTION INTRAMUSCULAR; INTRAVENOUS ONCE
Status: COMPLETED | OUTPATIENT
Start: 2019-06-07 | End: 2019-06-07

## 2019-06-07 RX ORDER — SODIUM CHLORIDE 9 MG/ML
INJECTION, SOLUTION INTRAVENOUS CONTINUOUS
Status: DISCONTINUED | OUTPATIENT
Start: 2019-06-07 | End: 2019-06-07 | Stop reason: HOSPADM

## 2019-06-07 RX ORDER — KETOROLAC TROMETHAMINE 30 MG/ML
30 INJECTION, SOLUTION INTRAMUSCULAR; INTRAVENOUS
Status: DISCONTINUED | OUTPATIENT
Start: 2019-06-07 | End: 2019-06-07

## 2019-06-07 RX ORDER — ONDANSETRON 2 MG/ML
INJECTION INTRAMUSCULAR; INTRAVENOUS
Status: DISCONTINUED | OUTPATIENT
Start: 2019-06-07 | End: 2019-06-07

## 2019-06-07 RX ORDER — FENTANYL CITRATE 50 UG/ML
25 INJECTION, SOLUTION INTRAMUSCULAR; INTRAVENOUS EVERY 5 MIN PRN
Status: DISCONTINUED | OUTPATIENT
Start: 2019-06-07 | End: 2019-06-07 | Stop reason: HOSPADM

## 2019-06-07 RX ORDER — OXYCODONE HYDROCHLORIDE 5 MG/1
5 TABLET ORAL EVERY 6 HOURS PRN
Qty: 5 TABLET | Refills: 0 | Status: SHIPPED | OUTPATIENT
Start: 2019-06-07 | End: 2019-06-18

## 2019-06-07 RX ORDER — LIDOCAINE HYDROCHLORIDE 10 MG/ML
INJECTION, SOLUTION EPIDURAL; INFILTRATION; INTRACAUDAL; PERINEURAL
Status: COMPLETED
Start: 2019-06-07 | End: 2019-06-07

## 2019-06-07 RX ORDER — VANCOMYCIN HCL IN 5 % DEXTROSE 1G/250ML
1000 PLASTIC BAG, INJECTION (ML) INTRAVENOUS
Status: DISCONTINUED | OUTPATIENT
Start: 2019-06-07 | End: 2019-06-07

## 2019-06-07 RX ORDER — SODIUM CHLORIDE 0.9 % (FLUSH) 0.9 %
10 SYRINGE (ML) INJECTION
Status: DISCONTINUED | OUTPATIENT
Start: 2019-06-07 | End: 2019-06-07 | Stop reason: HOSPADM

## 2019-06-07 RX ORDER — PHENYLEPHRINE HYDROCHLORIDE 10 MG/ML
INJECTION INTRAVENOUS
Status: DISCONTINUED | OUTPATIENT
Start: 2019-06-07 | End: 2019-06-07

## 2019-06-07 RX ORDER — FENTANYL CITRATE 50 UG/ML
INJECTION, SOLUTION INTRAMUSCULAR; INTRAVENOUS
Status: DISCONTINUED | OUTPATIENT
Start: 2019-06-07 | End: 2019-06-07

## 2019-06-07 RX ORDER — KETOROLAC TROMETHAMINE 30 MG/ML
30 INJECTION, SOLUTION INTRAMUSCULAR; INTRAVENOUS ONCE
Status: COMPLETED | OUTPATIENT
Start: 2019-06-07 | End: 2019-06-07

## 2019-06-07 RX ORDER — LIDOCAINE HYDROCHLORIDE 10 MG/ML
1 INJECTION, SOLUTION EPIDURAL; INFILTRATION; INTRACAUDAL; PERINEURAL ONCE
Status: COMPLETED | OUTPATIENT
Start: 2019-06-07 | End: 2019-06-07

## 2019-06-07 RX ORDER — OXYCODONE HYDROCHLORIDE 5 MG/1
5 TABLET ORAL EVERY 4 HOURS PRN
Status: DISCONTINUED | OUTPATIENT
Start: 2019-06-07 | End: 2019-06-07 | Stop reason: HOSPADM

## 2019-06-07 RX ORDER — MIDAZOLAM HYDROCHLORIDE 1 MG/ML
INJECTION, SOLUTION INTRAMUSCULAR; INTRAVENOUS
Status: DISCONTINUED | OUTPATIENT
Start: 2019-06-07 | End: 2019-06-07

## 2019-06-07 RX ORDER — LIDOCAINE HCL/PF 100 MG/5ML
SYRINGE (ML) INTRAVENOUS
Status: DISCONTINUED | OUTPATIENT
Start: 2019-06-07 | End: 2019-06-07

## 2019-06-07 RX ORDER — PROPOFOL 10 MG/ML
VIAL (ML) INTRAVENOUS
Status: DISCONTINUED | OUTPATIENT
Start: 2019-06-07 | End: 2019-06-07

## 2019-06-07 RX ADMIN — LIDOCAINE HYDROCHLORIDE 100 MG: 20 INJECTION, SOLUTION INTRAVENOUS at 07:06

## 2019-06-07 RX ADMIN — LIDOCAINE HYDROCHLORIDE 10 MG: 10 INJECTION, SOLUTION EPIDURAL; INFILTRATION; INTRACAUDAL; PERINEURAL at 06:06

## 2019-06-07 RX ADMIN — MIDAZOLAM HYDROCHLORIDE 2 MG: 1 INJECTION, SOLUTION INTRAMUSCULAR; INTRAVENOUS at 06:06

## 2019-06-07 RX ADMIN — PROPOFOL 150 MG: 10 INJECTION, EMULSION INTRAVENOUS at 07:06

## 2019-06-07 RX ADMIN — SODIUM CHLORIDE: 0.9 INJECTION, SOLUTION INTRAVENOUS at 06:06

## 2019-06-07 RX ADMIN — PHENYLEPHRINE HYDROCHLORIDE 100 MCG: 10 INJECTION INTRAVENOUS at 07:06

## 2019-06-07 RX ADMIN — ONDANSETRON 4 MG: 2 INJECTION INTRAMUSCULAR; INTRAVENOUS at 07:06

## 2019-06-07 RX ADMIN — KETOROLAC TROMETHAMINE 30 MG: 30 INJECTION, SOLUTION INTRAMUSCULAR at 08:06

## 2019-06-07 RX ADMIN — CEFAZOLIN 2 G: 330 INJECTION, POWDER, FOR SOLUTION INTRAMUSCULAR; INTRAVENOUS at 07:06

## 2019-06-07 RX ADMIN — FENTANYL CITRATE 50 MCG: 50 INJECTION, SOLUTION INTRAMUSCULAR; INTRAVENOUS at 07:06

## 2019-06-07 NOTE — OP NOTE
[facility]  Operative Note    Date: 06/07/2019    Pre-Op Diagnosis: right flank pain    Post-Op Diagnosis: right UPJ obstruction     Procedure(s) Performed:   1.  Right ureteroscopy  2.  Cystoscopy  3.  Right retrograde pyelogram   4.  Left retrograde pyelogram   5.  Fluoro < 1 h  6.  Intraoperative interpretation of radiographic images     Specimen(s): none    Staff Surgeon: Vipul Sharma MD    Assistant Surgeon: Hermelindo Duncan MD    Anesthesia: General LMA anesthesia    Indications: Hillary Fountain is a 33 y.o. female with right flank pain.  CT scan did not demonstrate ureteral or renal stones, although US may have evidence of mild pyelectasis on the right side.     Findings:     - right pyelectasis with narrowed right UPJ with flap- valve type obstructing tissue   - left retrograde pyelogram unremarkable   - no stent placed as we will plan for Mag 3 scan to determine obstruction      Estimated Blood Loss: min    Drains: none    Procedure in detail:  After informed consent was obtained, the patient was brought the the cystoscopy suite and placed in the supine position.  SCDs were applied and working.  Anesthesia was administered.  The patient was then placed in the dorsal lithotomy position and prepped and draped in the usual sterile fashion.      A rigid cystoscope in a 22 Fr sheath was introduced into the patient's urethra.  This passed easily.  The entire urethra was visualized which showed no strictures or masses.  Formal cystoscopy was performed which revealed no masses or lesions suspicious for malignancy, no bladder stones, no bladder diverticuli, no trabeculations.  The ureteral orifices were visualized in the normal anatomic position bilaterally.     A motion wire was passed up the right ureteral orifice and up into the kidney.  This passed easily and placement was confirmed using fluoro.  The cystoscope was removed keeping the guidewire in place and the wire was secured to the  drape.      An 8 Fr rigid ureteroscope was passed into the patient's bladder alongside the wire under direct vision.  It was then passed through the right ureteral orifice alongside the wire.  No stones were encountered.  The UPJ was noted to have a flap-valve type of obstructing tissue.  Photos were saved of this area.  A retrograde pyelogram was performed which demonstrated narrowing at the UPJ with pyeletasis, delicate calyces, and no filling defects.  The scope and then the wire were removed. No ureteral injuries were identified on removal.     Left retrograde pyelogram was performed. This demonstrated no filling defects and no hydronephrosis.      The patient tolerated the procedure well and was transferred to the recovery room in stable condition.      Disposition:  The patient will follow up with Dr. Sharma within the next month with a Mag-3 renal scan with lasix.      Hermelindo Duncan MD

## 2019-06-07 NOTE — TRANSFER OF CARE
"Anesthesia Transfer of Care Note    Patient: Hillary Fountain    Procedure(s) Performed: Procedure(s) (LRB):  URETEROSCOPY (Right)  CYSTOSCOPY, WITH RETROGRADE PYELOGRAM (Bilateral)    Patient location: PACU    Anesthesia Type: general    Transport from OR: Transported from OR on room air with adequate spontaneous ventilation    Post pain: adequate analgesia    Post assessment: no apparent anesthetic complications    Post vital signs: stable    Level of consciousness: sedated    Nausea/Vomiting: no nausea/vomiting    Complications: none    Transfer of care protocol was followed      Last vitals:   Visit Vitals  /63 (BP Location: Right arm, Patient Position: Lying)   Pulse 76   Temp 37.2 °C (99 °F) (Oral)   Resp 20   Ht 5' 2" (1.575 m)   Wt 65.8 kg (145 lb)   LMP 05/22/2019 (Exact Date)   SpO2 100%   Breastfeeding? No   BMI 26.52 kg/m²     "

## 2019-06-07 NOTE — DISCHARGE INSTRUCTIONS
Treating Kidney Stones: Ureteroscopic Stone Removal    Ureteroscopic stone removal may be done before, after, or instead of other treatments. If you need this procedure, your healthcare provider will discuss its risks and possible complications. You will be told how to prepare. And you will be told about anesthesia that will keep you pain-free during treatment.     A ureteroscope lets your doctor see your stone before removing it.   Removing the stone through the ureter  Ureteroscopic stone removal extracts a small stone in your ureter without an incision. Your doctor places a viewing tube (ureteroscope) in your ureter. A wire basket inserted through the tube removes the stone. Sometimes, a laser or a mechanical device is used to break up the stone. A soft tube may be left in your ureter briefly to drain urine.     The stone may be fragmented. The stone is then withdrawn or passed.   Your recovery  This is an outpatient or overnight procedure. For a few days after surgery, you may feel some pain when you urinate. Or you may need to urinate more often, or have bloody urine. You may have a ureteral stent. This is a soft tube that prevents blockage from swelling after the procedure. The stent is removed when the swelling goes down, often within days. Follow up as instructed to check for any new stones.  When to call your healthcare provider  Call your healthcare provider right away if:  · You have sudden pain or flank pain  · You have a fever over 100.4°F (38°C)  · You have nausea that lasts for days  · You have heavy bleeding when you urinate  · You have swelling or redness around your incision   Date Last Reviewed: 2/1/2017  © 7768-4448 [employer]. [address] [place] [address], [place] [address]. All rights reserved. This information is not intended as a substitute for professional medical care. Always follow your healthcare professional's instructions.

## 2019-06-07 NOTE — DISCHARGE SUMMARY
[facility]  Discharge Note  Short Stay    Admit Date: 2019    Discharge Date and Time: 2019       Attending Physician: Vipul Sharma MD     Discharge Provider: Hermelindo Duncan    Diagnoses:  Active Hospital Problems    Diagnosis  POA    *Ureteral stone [N20.1]  Yes     delivery delivered [O82]  Yes      Resolved Hospital Problems   No resolved problems to display.       Discharged Condition: good    Hospital Course: Patient was admitted for a right ureteroscopy and tolerated the procedure well with no complications.  It appears that she may have a right UPJ obstruction.  She will follow up in one month with a Mag 3 renal scan with lasix.     Final Diagnoses: Same as principal problem.    Disposition: Home or Self Care    Follow up/Patient Instructions:    Medications:  Reconciled Home Medications:      Medication List      START taking these medications    oxyCODONE 5 MG immediate release tablet  Commonly known as:  ROXICODONE  Take 1 tablet (5 mg total) by mouth every 6 (six) hours as needed for Pain.     tamsulosin 0.4 mg Cap  Commonly known as:  FLOMAX  Take 1 capsule (0.4 mg total) by mouth once daily.        CONTINUE taking these medications    ondansetron 4 MG Tbdl  Commonly known as:  ZOFRAN-ODT  Take 1 tablet (4 mg total) by mouth every 8 (eight) hours as needed.     polycarbophil 625 mg tablet  Commonly known as:  FIBERCON  Take 625 mg by mouth once daily.        STOP taking these medications    ibuprofen 600 MG tablet  Commonly known as:  ADVIL,MOTRIN     naproxen 500 MG tablet  Commonly known as:  NAPROSYN          Discharge Procedure Orders   X-Ray Abdomen AP 1 View   Standing Status: Future Standing Exp. Date: 20     Order Specific Question Answer Comments   Reason for Exam: nephrolithiasis      US Retroperitoneal Limited   Standing Status: Future Standing Exp. Date: 20     Order Specific Question Answer Comments   Reason for Exam: renal ultrasound      NM  Renal Scan with LASIX   Standing Status: Future Standing Exp. Date: 06/07/20     Order Specific Question Answer Comments   May the Radiologist modify the order per protocol to meet the clinical needs of the patient? Yes      Call MD for:  temperature >100.4     Call MD for:  persistent nausea and vomiting or diarrhea     Call MD for:  severe uncontrolled pain     Call MD for:  redness, tenderness, or signs of infection (pain, swelling, redness, odor or green/yellow discharge around incision site)     Call MD for:  difficulty breathing or increased cough     Call MD for:  severe persistent headache     Call MD for:  worsening rash     Call MD for:  persistent dizziness, light-headedness, or visual disturbances     Call MD for:  increased confusion or weakness     No dressing needed     Follow-up Information     Vipul Sharma MD In 4 weeks.    Specialty:  Urology  Why:  post op URS  Contact information:  [address]  [facility] [id number] [phone]

## 2019-06-07 NOTE — PLAN OF CARE
Discharge instructions given, patient verbalized understanding. Consents in chart, Vitals stable, no complaints. Pt. Voided 150mls

## 2019-06-07 NOTE — INTERVAL H&P NOTE
The patient has been examined and the H&P has been reviewed:    I concur with the findings and no changes have occurred since H&P was written.   Urine dipstick today positive for trace blood, negative for nitrites and LE.    Anesthesia/Surgery risks, benefits and alternative options discussed and understood by patient/family.          Active Hospital Problems    Diagnosis  POA    Ureteral stone [N20.1]  Yes      Resolved Hospital Problems   No resolved problems to display.

## 2019-06-07 NOTE — ANESTHESIA POSTPROCEDURE EVALUATION
Anesthesia Post Evaluation    Patient: Hillary Fountain    Procedure(s) Performed: Procedure(s) (LRB):  URETEROSCOPY (Right)  CYSTOSCOPY, WITH RETROGRADE PYELOGRAM (Bilateral)    Final Anesthesia Type: general  Patient location during evaluation: PACU  Patient participation: Yes- Able to Participate  Level of consciousness: awake and alert and oriented  Post-procedure vital signs: reviewed and stable  Pain management: adequate  Airway patency: patent  PONV status at discharge: No PONV  Anesthetic complications: no      Cardiovascular status: blood pressure returned to baseline and hemodynamically stable  Respiratory status: unassisted, spontaneous ventilation and room air  Hydration status: euvolemic  Follow-up not needed.          Vitals Value Taken Time   BP 99/60 6/7/2019  9:00 AM   Temp 36.6 °C (97.9 °F) 6/7/2019  9:00 AM   Pulse 61 6/7/2019  9:00 AM   Resp 15 6/7/2019  9:00 AM   SpO2 100 % 6/7/2019  9:00 AM         No case tracking events are documented in the log.      Pain/Apple Score: Pain Rating Prior to Med Admin: 6 (6/7/2019  8:26 AM)  Pain Rating Post Med Admin: 2 (6/7/2019  8:39 AM)  Apple Score: 10 (6/7/2019  7:49 AM)

## 2019-06-10 ENCOUNTER — HOSPITAL ENCOUNTER (OUTPATIENT)
Dept: RADIOLOGY | Facility: HOSPITAL | Age: 34
Discharge: HOME | End: 2019-06-10
Attending: STUDENT IN AN ORGANIZED HEALTH CARE EDUCATION/TRAINING PROGRAM
Payer: COMMERCIAL

## 2019-06-10 DIAGNOSIS — R10.9 FLANK PAIN: ICD-10-CM

## 2019-06-10 PROCEDURE — 78708 NM KIDNEY W FLOW AND FUNCTION W PHARMACOLOGICAL: ICD-10-PCS | Mod: 26,,, | Performed by: RADIOLOGY

## 2019-06-10 PROCEDURE — A9562 TC99M MERTIATIDE: HCPCS

## 2019-06-10 PROCEDURE — 78708 K FLOW/FUNCT IMAGE W/DRUG: CPT | Mod: 26,,, | Performed by: RADIOLOGY

## 2019-06-10 PROCEDURE — 63600175 PHARM REV CODE 636 W HCPCS: Performed by: STUDENT IN AN ORGANIZED HEALTH CARE EDUCATION/TRAINING PROGRAM

## 2019-06-10 RX ORDER — FUROSEMIDE 10 MG/ML
40 INJECTION INTRAMUSCULAR; INTRAVENOUS ONCE
Status: COMPLETED | OUTPATIENT
Start: 2019-06-10 | End: 2019-06-10

## 2019-06-10 RX ADMIN — FUROSEMIDE 40 MG: 10 INJECTION, SOLUTION INTRAMUSCULAR; INTRAVENOUS at 02:06

## 2019-06-11 ENCOUNTER — OFFICE VISIT (OUTPATIENT)
Dept: UROLOGY | Facility: CLINIC | Age: 34
End: 2019-06-11
Payer: COMMERCIAL

## 2019-06-11 VITALS
SYSTOLIC BLOOD PRESSURE: 112 MMHG | DIASTOLIC BLOOD PRESSURE: 75 MMHG | WEIGHT: 152 LBS | HEART RATE: 73 BPM | HEIGHT: 62 IN | BODY MASS INDEX: 27.97 KG/M2 | TEMPERATURE: 98 F

## 2019-06-11 DIAGNOSIS — R10.9 FLANK PAIN: ICD-10-CM

## 2019-06-11 DIAGNOSIS — Q62.11 HYDRONEPHROSIS WITH URETEROPELVIC JUNCTION (UPJ) OBSTRUCTION: ICD-10-CM

## 2019-06-11 DIAGNOSIS — N20.0 KIDNEY STONES: Primary | ICD-10-CM

## 2019-06-11 LAB
BACTERIA #/AREA URNS AUTO: ABNORMAL /HPF
BILIRUB UR QL STRIP: NEGATIVE
CLARITY UR REFRACT.AUTO: CLEAR
COLOR UR AUTO: YELLOW
GLUCOSE UR QL STRIP: NEGATIVE
HGB UR QL STRIP: ABNORMAL
KETONES UR QL STRIP: NEGATIVE
LEUKOCYTE ESTERASE UR QL STRIP: NEGATIVE
MICROSCOPIC COMMENT: ABNORMAL
NITRITE UR QL STRIP: NEGATIVE
PH UR STRIP: 6 [PH] (ref 5–8)
PROT UR QL STRIP: NEGATIVE
RBC #/AREA URNS AUTO: 63 /HPF (ref 0–4)
SP GR UR STRIP: 1.02 (ref 1–1.03)
SQUAMOUS #/AREA URNS AUTO: 4 /HPF
URN SPEC COLLECT METH UR: ABNORMAL
WBC #/AREA URNS AUTO: 3 /HPF (ref 0–5)

## 2019-06-11 PROCEDURE — 87086 URINE CULTURE/COLONY COUNT: CPT

## 2019-06-11 PROCEDURE — 99999 PR PBB SHADOW E&M-EST. PATIENT-LVL III: CPT | Mod: PBBFAC,,, | Performed by: UROLOGY

## 2019-06-11 PROCEDURE — 81001 URINALYSIS AUTO W/SCOPE: CPT

## 2019-06-11 PROCEDURE — 99214 PR OFFICE/OUTPT VISIT, EST, LEVL IV, 30-39 MIN: ICD-10-PCS | Mod: S$GLB,,, | Performed by: UROLOGY

## 2019-06-11 PROCEDURE — 3008F BODY MASS INDEX DOCD: CPT | Mod: CPTII,S$GLB,, | Performed by: UROLOGY

## 2019-06-11 PROCEDURE — 3008F PR BODY MASS INDEX (BMI) DOCUMENTED: ICD-10-PCS | Mod: CPTII,S$GLB,, | Performed by: UROLOGY

## 2019-06-11 PROCEDURE — 99214 OFFICE O/P EST MOD 30 MIN: CPT | Mod: S$GLB,,, | Performed by: UROLOGY

## 2019-06-11 PROCEDURE — 99999 PR PBB SHADOW E&M-EST. PATIENT-LVL III: ICD-10-PCS | Mod: PBBFAC,,, | Performed by: UROLOGY

## 2019-06-11 RX ORDER — KETOROLAC TROMETHAMINE 10 MG/1
10 TABLET, FILM COATED ORAL EVERY 6 HOURS PRN
Qty: 15 TABLET | Refills: 0 | Status: SHIPPED | OUTPATIENT
Start: 2019-06-11 | End: 2019-06-16

## 2019-06-11 NOTE — PROGRESS NOTES
"Subjective:      Patient ID: Hillary Fountain is a 33 y.o. female.    Chief Complaint: Nephrolithiasis    Patient is a 33 y.o. female who is established to our clinic and referred by the ED, Dr. Dawkins for evaluation of flank pain.     HPI  She previously was seen for acute flank pain.  She had a small distal right ureteral stone vs phlebolith seen on CT scan in 1/2019.  She underwent a right ureteroscopy on 6/7/19 which ruled out a ureteral stone.  However, on retrograde pyelogram with comparison to the left kidney, it appeared as if she might have a right UPJ obstruction.  She was set up for a nuclear medicine study (mag-3 renal scan).  This showed differential function of 52% versus 48% left and right kidney function.  There was spontaneous excretion of contrast from the right kidney even prior to Lasix administration.  The T1/2 was 11.    Since surgery she has had continued constant pain in the right side.  Worse with lifting and laying on her side.  NO fever.      She is lactose intolerant---no significant dairy intake.  Eats fruits and meat---primarily chicken with "most meals".     Has passed kidney stones in the past.      Review of Systems   All other systems reviewed and negative except pertinent positives noted in HPI.    Objective:     Physical Exam   Constitutional: She is oriented to person, place, and time. She appears well-developed and well-nourished. No distress.   HENT:   Head: Normocephalic and atraumatic.   Eyes: No scleral icterus.   Neck: No tracheal deviation present.   Pulmonary/Chest: Effort normal. No respiratory distress.   Neurological: She is alert and oriented to person, place, and time.   Psychiatric: She has a normal mood and affect. Her behavior is normal. Judgment and thought content normal.     Assessment:     1. Kidney stones    2. Flank pain      Plan:     1. Kidney stones    2. Flank pain        No orders of the defined types were placed in this encounter.    1. Kidney " stones:  -General risk factors for kidney stones and the conservative measures to prevent kidney stones in the future were discussed with the patient in detail.  The patient was encouraged to drink 2-3 liters of water a day, limit iced tea and jess as well as foods high in oxalate.  They were cautioned to try to limit salt and red meat intake.  We also discussed adding citrate to the diet with the addition of edgar or lemon juice to their water or alternatively with crystal light.   -mag-3 renal scan reviewed today.   No significant obstruction or detriment in terms of renal function on the right side which was the side suspected to have a possible UPJ obstruction.  -pain control p.r.n. if no relief in 1 week, the patient will notify us.  -UA and urine culture  -plan for repeat mag-3 renal scan 6 months.   I spent 25 minutes with the patient of which more than half was spent in direct consultation with the patient in regards to our treatment and plan.

## 2019-06-12 LAB — BACTERIA UR CULT: NORMAL

## 2019-06-18 ENCOUNTER — OFFICE VISIT (OUTPATIENT)
Dept: OBSTETRICS AND GYNECOLOGY | Facility: CLINIC | Age: 34
End: 2019-06-18
Attending: OBSTETRICS & GYNECOLOGY
Payer: COMMERCIAL

## 2019-06-18 VITALS
WEIGHT: 157.88 LBS | HEIGHT: 62 IN | SYSTOLIC BLOOD PRESSURE: 120 MMHG | DIASTOLIC BLOOD PRESSURE: 76 MMHG | BODY MASS INDEX: 29.05 KG/M2

## 2019-06-18 DIAGNOSIS — N94.6 DYSMENORRHEA: ICD-10-CM

## 2019-06-18 DIAGNOSIS — Z01.411 ENCOUNTER FOR GYNECOLOGICAL EXAMINATION (GENERAL) (ROUTINE) WITH ABNORMAL FINDINGS: Primary | ICD-10-CM

## 2019-06-18 PROCEDURE — 99395 PR PREVENTIVE VISIT,EST,18-39: ICD-10-PCS | Mod: S$GLB,,, | Performed by: OBSTETRICS & GYNECOLOGY

## 2019-06-18 PROCEDURE — 99999 PR PBB SHADOW E&M-EST. PATIENT-LVL III: ICD-10-PCS | Mod: PBBFAC,,, | Performed by: OBSTETRICS & GYNECOLOGY

## 2019-06-18 PROCEDURE — 99395 PREV VISIT EST AGE 18-39: CPT | Mod: S$GLB,,, | Performed by: OBSTETRICS & GYNECOLOGY

## 2019-06-18 PROCEDURE — 99999 PR PBB SHADOW E&M-EST. PATIENT-LVL III: CPT | Mod: PBBFAC,,, | Performed by: OBSTETRICS & GYNECOLOGY

## 2019-06-18 NOTE — PROGRESS NOTES
Subjective:       Patient ID: Hillary Fountain is a 33 y.o. female.    Chief Complaint:  Well Woman      History of Present Illness.  Hillary Fountain is a 33 y.o. female.  She has no breast or urinary symptoms.  She has no menorrhagia, oligomenorrhea, bleeding betweeen menses, postcoital bleeding, pelvic pain, dyspareunia, vaginal dryness, vaginal discharge, or sexual complaints.  She has moderate menstrual cramps.  Ibuprofen helps.   had vasectomy.    GYN & OB History  Patient's last menstrual period was 2019.   Date of Last Pap: 2017 Normal    OB History    Para Term  AB Living   5 4 4 0 1 4   SAB TAB Ectopic Multiple Live Births   0 0 0 0 4      # Outcome Date GA Lbr Tex/2nd Weight Sex Delivery Anes PTL Lv   5 Term 18 39w0d  3.45 kg (7 lb 9.7 oz) F CS-LTranv Spinal N FABIAN   4 Term 01/09/15 39w3d  3.502 kg (7 lb 11.5 oz) M CS-LTranv Spinal N FABIAN   3 Term 03/05/10 38w0d  2.977 kg (6 lb 9 oz) M CS-LTranv Spinal  FABIAN   2 AB 2005 8w0d          1 Term 04 37w0d  3.544 kg (7 lb 13 oz) M CS-LTranv EPI  FABIAN      Complications: Fetal Intolerance      Obstetric Comments   Age at menarche 14       Past Medical History:   Diagnosis Date    History of urinary calculi     Kidney infection     kidney stones aand kidney infection and     Kidney stones      Past Surgical History:   Procedure Laterality Date    BREAST AUGMENTATION  2011     SECTION  2004    Boy     SECTION  2010    Boy     SECTION  2015    Boy     SECTION  2018    Girl    CHOLECYSTECTOMY      CYSTOSCOPY, WITH RETROGRADE PYELOGRAM Bilateral 2019    [facility]ST [facility]    DELIVERY-CEASAREAN SECTION N/A 2015    Performed by Uzma Comer MD at [facility] L&D    DELIVERY- SECTION N/A 2018    Performed by [facility]on [facility] at [facility] L[facility]D    DILATION AND CURETTAGE OF UTERUS      TAB     "URETEROSCOPY Right 6/7/2019    [facility]     Family History   Problem Relation Age of Onset    Diabetes Maternal Grandfather     Diabetes Father     Hypertension Father     Anemia Mother     Diabetes Paternal Grandfather     Dementia Paternal Grandmother     No Known Problems Maternal Grandmother     No Known Problems Sister     No Known Problems Sister     Breast cancer Neg Hx     Colon cancer Neg Hx     Ovarian cancer Neg Hx     Stroke Neg Hx     Cancer Neg Hx      Social History     Tobacco Use    Smoking status: Never Smoker    Smokeless tobacco: Never Used   Substance Use Topics    Alcohol use: No    Drug use: No       Current Outpatient Medications:     polycarbophil (FIBERCON) 625 mg tablet, Take 625 mg by mouth once daily., Disp: , Rfl:     Review of patient's allergies indicates:  No Known Allergies    Review of Systems  Review of Systems   Constitutional: Negative for fatigue.   HENT: Negative for trouble swallowing.    Eyes: Negative for visual disturbance.   Respiratory: Negative for cough and shortness of breath.    Cardiovascular: Negative for chest pain.   Gastrointestinal: Negative for abdominal distention, abdominal pain, blood in stool, nausea and vomiting.   Genitourinary: Negative for difficulty urinating, dyspareunia, dysuria, flank pain, frequency, hematuria, pelvic pain, urgency, vaginal bleeding, vaginal discharge and vaginal pain.   Musculoskeletal: Negative for arthralgias.   Skin: Negative for rash.   Neurological: Negative for dizziness and headaches.   Psychiatric/Behavioral: Negative for sleep disturbance. The patient is not nervous/anxious.         Objective:     Vitals:    06/18/19 1429   BP: 120/76   Weight: 71.6 kg (157 lb 13.6 oz)   Height: 5' 2" (1.575 m)   PainSc: 0-No pain     Body mass index is 28.87 kg/m².      Physical Exam:   Constitutional: She is oriented to person, place, and time. Vital signs are normal. She " appears well-developed and well-nourished.    HENT:   Head: Normocephalic.     Neck: Normal range of motion. No thyromegaly present.     Pulmonary/Chest: Right breast exhibits no mass, no nipple discharge, no skin change, no tenderness and no swelling. Left breast exhibits no mass, no nipple discharge, no skin change, no tenderness and no swelling. Breasts are symmetrical.        Abdominal: Soft. Normal appearance and bowel sounds are normal. She exhibits no distension. There is no tenderness.     Genitourinary: Vagina normal and uterus normal. Pelvic exam was performed with patient supine. There is no rash, tenderness, lesion or injury on the right labia. There is no rash, tenderness, lesion or injury on the left labia. Cervix is normal. Right adnexum displays no mass, no tenderness and no fullness. Left adnexum displays no mass, no tenderness and no fullness. No erythema in the vagina. No vaginal discharge found. Cervix exhibits no motion tenderness and no discharge.           Musculoskeletal: Normal range of motion.      Lymphadenopathy:        Right: No inguinal and no supraclavicular adenopathy present.        Left: No inguinal and no supraclavicular adenopathy present.    Neurological: She is alert and oriented to person, place, and time.    Skin: Skin is warm and dry.    Psychiatric: She has a normal mood and affect.        Assessment/ Plan:     Encounter for gynecological examination (general) (routine) with abnormal findings    Dysmenorrhea      Ibuprofen 600 mg every 6 hours as needed for pain.  Routine pap smears.  Self breast exam  Diet and exercise discussed.  Contraception reviewed/discussed.  Follow-up with me in 1 year.

## 2019-10-16 ENCOUNTER — OFFICE VISIT (OUTPATIENT)
Dept: PHYSICAL MEDICINE AND REHAB | Facility: CLINIC | Age: 34
End: 2019-10-16
Payer: COMMERCIAL

## 2019-10-16 VITALS
HEART RATE: 73 BPM | BODY MASS INDEX: 27.6 KG/M2 | DIASTOLIC BLOOD PRESSURE: 60 MMHG | SYSTOLIC BLOOD PRESSURE: 105 MMHG | HEIGHT: 62 IN | WEIGHT: 150 LBS

## 2019-10-16 DIAGNOSIS — S06.0X0A CONCUSSION WITHOUT LOSS OF CONSCIOUSNESS, INITIAL ENCOUNTER: Primary | ICD-10-CM

## 2019-10-16 DIAGNOSIS — H93.13 TINNITUS OF BOTH EARS: ICD-10-CM

## 2019-10-16 PROCEDURE — 99245 PR OFFICE CONSULTATION,LEVEL V: ICD-10-PCS | Mod: S$GLB,,, | Performed by: PHYSICAL MEDICINE & REHABILITATION

## 2019-10-16 PROCEDURE — 99999 PR PBB SHADOW E&M-EST. PATIENT-LVL III: ICD-10-PCS | Mod: PBBFAC,,, | Performed by: PHYSICAL MEDICINE & REHABILITATION

## 2019-10-16 PROCEDURE — 99245 OFF/OP CONSLTJ NEW/EST HI 55: CPT | Mod: S$GLB,,, | Performed by: PHYSICAL MEDICINE & REHABILITATION

## 2019-10-16 PROCEDURE — 99999 PR PBB SHADOW E&M-EST. PATIENT-LVL III: CPT | Mod: PBBFAC,,, | Performed by: PHYSICAL MEDICINE & REHABILITATION

## 2019-10-16 RX ORDER — TRAZODONE HYDROCHLORIDE 50 MG/1
50 TABLET ORAL NIGHTLY
Qty: 30 TABLET | Refills: 0 | Status: SHIPPED | OUTPATIENT
Start: 2019-10-16 | End: 2020-12-17

## 2019-10-16 RX ORDER — MECLIZINE HCL 12.5 MG 12.5 MG/1
12.5 TABLET ORAL 3 TIMES DAILY PRN
Qty: 30 TABLET | Refills: 0 | Status: SHIPPED | OUTPATIENT
Start: 2019-10-16 | End: 2020-12-17

## 2019-10-16 NOTE — LETTER
[facility]  [address]  [facility] [address]  Phone: [phone] October 16, 2019     Patient: Hillary Fountain    YOB: 1985   Date of Visit: 10/16/2019       To Whom It May Concern:    It is my medical opinion that Hillary Fountain  should remain out of participation of work duties until 10/25. She may return to full participation of work duties on 10/28.    If you have any questions or concerns, please don't hesitate to call.    Sincerely,        Taqueria Nixon MD

## 2019-10-16 NOTE — LETTER
October 23, 2019      James Villalta DO  [address]  [facility] [address]           [facility]  [address]  [facility] [address]  Phone: [phone]          Patient: Hillary Fountain   MR Number: [id number]   YOB: 1985   Date of Visit: 10/16/2019       Dear [facility] :    Thank you for referring Hillary Fountain to me for evaluation. Attached you will find relevant portions of my assessment and plan of care.    If you have questions, please do not hesitate to call me. I look forward to following Hillary Fountain along with you.    Sincerely,    Taqueria Nixon MD    Enclosure  CC:  No Recipients    If you would like to receive this communication electronically, please contact [email or web address] or [phone] to request more information on [employer] Link access.    For providers and/or their staff who would like to refer a patient to [facility], please contact us through our one-stop-shop provider referral line, [facility], at [phone].    If you feel you have received this communication in error or would no longer like to receive these types of communications, please e-mail [email or web address]

## 2019-10-21 ENCOUNTER — PATIENT MESSAGE (OUTPATIENT)
Dept: PHYSICAL MEDICINE AND REHAB | Facility: CLINIC | Age: 34
End: 2019-10-21
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
## 2019-10-31 ENCOUNTER — CLINICAL SUPPORT (OUTPATIENT)
Dept: REHABILITATION | Facility: HOSPITAL | Age: 34
End: 2019-10-31
Payer: COMMERCIAL

## 2019-10-31 DIAGNOSIS — M54.2 NECK PAIN: ICD-10-CM

## 2019-10-31 DIAGNOSIS — R68.89 SENSITIVITY TO LIGHT: ICD-10-CM

## 2019-10-31 DIAGNOSIS — M54.2 CERVICAL PAIN: ICD-10-CM

## 2019-10-31 DIAGNOSIS — H81.12 BPPV (BENIGN PAROXYSMAL POSITIONAL VERTIGO), LEFT: Primary | ICD-10-CM

## 2019-10-31 PROCEDURE — 97162 PT EVAL MOD COMPLEX 30 MIN: CPT | Mod: PN

## 2019-10-31 PROCEDURE — 97112 NEUROMUSCULAR REEDUCATION: CPT | Mod: PN
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
## 2019-11-05 ENCOUNTER — CLINICAL SUPPORT (OUTPATIENT)
Dept: REHABILITATION | Facility: HOSPITAL | Age: 34
End: 2019-11-05
Payer: COMMERCIAL

## 2019-11-05 DIAGNOSIS — R68.89 SENSITIVITY TO LIGHT: ICD-10-CM

## 2019-11-05 DIAGNOSIS — H81.12 BPPV (BENIGN PAROXYSMAL POSITIONAL VERTIGO), LEFT: ICD-10-CM

## 2019-11-05 DIAGNOSIS — M54.2 CERVICAL PAIN: ICD-10-CM

## 2019-11-05 PROCEDURE — 97140 MANUAL THERAPY 1/> REGIONS: CPT | Mod: PN

## 2019-11-05 PROCEDURE — 97112 NEUROMUSCULAR REEDUCATION: CPT | Mod: PN

## 2019-11-05 PROCEDURE — 97110 THERAPEUTIC EXERCISES: CPT | Mod: PN

## 2019-11-05 NOTE — PROGRESS NOTES
Physical Therapy Daily Treatment Note     Name: Hillary SEGUNDO [facility] Number: [id number]    Therapy Diagnosis:   Encounter Diagnoses   Name Primary?    Cervical pain     BPPV (benign paroxysmal positional vertigo), left     Sensitivity to light      Physician: Taqueria Nixon MD    Visit Date: 11/5/2019    Physician Orders: PT Eval and Treat   Medical Diagnosis from Referral: S06.0X0A (ICD-10-CM) - Concussion without loss of consciousness, initial encounter  Evaluation Date: 10/31/2019  Authorization Period Expiration: 12/31/19  Plan of Care Expiration: 11/29/19  Visit # / Visits authorized: 2/ 35     Time In: 4:15  Time Out: 4:30  Total Billable Time: 15 minutes     Precautions: light sensitivity    Subjective     Pt reports: mild dizziness this am after turning her computer resolution up to normal on the monitor.  She was compliant with home exercise program.  Response to previous treatment: less dizziness  Functional change: able to work fully with computers    Pain: see TB note    Objective       Hillary participated in neuromuscular re-education activities to improve: canalith position for 15 minutes. The following activities were included:    Horizontal Canals testing for BPPV              Right: (-)              Left (+) geotropic nystagmus 3 beats.    Pt performed Casani maneuver x 3 trials to the L side and 1 trial to the R.     Pt retested with L roll test (-) post manuevers.     See TB note for remainder of session for cervical pain intervention.    Education provided:   -d/c BBQ and manuevers  - reduce screen time as much as possible   - rest as much as possible during HAs    Written Home Exercises Provided: yes.  Exercises were reviewed and Hillary was able to demonstrate them prior to the end of the session.  Hillary demonstrated good  understanding of the education provided.      See EMR under Media for exercises provided 10/31/2019.       Assessment     Pt was tested for horizontal canal  BPPV today post treatment and was (-) for L roll test after treatment.  Pt would continue to benefit from PT for intervention for cervical pain.  Hillary is progressing well towards her goals.   Pt prognosis is Good.     Pt will continue to benefit from skilled outpatient physical therapy to address the deficits listed in the problem list box on initial evaluation, provide pt/family education and to maximize pt's level of independence in the home and community environment.     Pt's spiritual, cultural and educational needs considered and pt agreeable to plan of care and goals.     Anticipated barriers to physical therapy: job environment, light sensitivity, Aura (migraine like symptoms)    Long Term Goals (6 Weeks):   1. Pt will improve FOTO to </=10% limitation to improve perceived limitation with changing and maintaining mobility. (PROGRESSING, NOT MET)   2. Pt will improve cervical AROM to WNL in all planes to improve cervical mobility for driving. (PROGRESSING, NOT MET)   3. Pt will report no pain with cervical AROM in all planes to promote QOL.  (PROGRESSING, NOT MET)   4. Pt will have (-) roll test B. (PROGRESSING, NOT MET)   5. Pt will have 2 weeks of no dizziness or spinning for improved vestibar function. (PROGRESSING, NOT MET)   6. Pt will score Pass on all components of the modified CTSIB to show normal function of all elements.  (PROGRESSING, NOT MET)     Plan     Reassess roll test if pt still having dizziness and unsteadiness, continue PT for cervical pain.    Ewelina Lovett, PT

## 2019-11-05 NOTE — PROGRESS NOTES
"  Physical Therapy Daily Treatment Note     Name: Hillary Fountain  Clinic Number: [id number]    Therapy Diagnosis:   Encounter Diagnoses   Name Primary?    Cervical pain     BPPV (benign paroxysmal positional vertigo), left     Sensitivity to light      Physician: Taqueria Nixon MD    Visit Date: 11/5/2019    Physician Orders: PT Eval and Treat  Medical Diagnosis: Concussion without loss of consciousness, initial encounter  Evaluation Date: 10/31/19  Authorization Period Expiration: 12/31/19  Plan of Care Certification Period: 10/31/19 to 11/29/19  Visit #/Visits authorized: 2/ 35     Time In: 1600  Time Out: 1700  Total Billable Time: 45 minutes    Precautions: light sensitivity     Subjective     Pt reports: Still has sensitivity to light. Had slight difficulty at work even with turning computer light contrast low at work.  She was compliant with home exercise program.  Response to previous treatment: Today is pt's first visit since initial evaluation  Functional change: Today is pt's first visit since initial evaluation    Pain: 0/10  Location: bilateral neck      Objective     Hillary received therapeutic exercises to develop strength, endurance, ROM, flexibility and posture for 30 minutes including:    Chin Tucks 3x10, 5"  Slouch Overcorrect 10x10"  Scapular Retractions 3x10, 5"  Levator Scap Stretch 3x30"  Upper Trap Stretch 3x30"  Cervical Isometrics 3x10, 5"     Hillary received the following manual therapy techniques: Joint mobilizations and Soft tissue Mobilization were applied to the: Neck/Shoulder for 15 minutes, including:  -Scapular PROM  -Scapular Release  -Upper Trap/Levator Trap STM       Home Exercises Provided and Patient Education Provided     Education provided:   - HEP    Written Home Exercises Provided: yes.  Exercises were reviewed and Hillary was able to demonstrate them prior to the end of the session.  Hillary demonstrated good  understanding of the education provided.     See EMR " under Patient Instructions for exercises provided 11/5/2019.    Assessment     Pt was able to tolerate all exercises during today's first visit after the initial evaluation. Pt saw Neuro PT for first 15 min of session for Cassany Maneuver for 3 trials to the left. Pt's symptoms and dizziness were improved and treated. Pt had tenderness to palpation around dorsal global area of cervical spine. Pt with tightness in upper traps and guarding of scapular stabilizers. Pt given HEP and was able to verbalize and demonstrate understanding to PT.     Hillary is progressing well towards her goals.   Pt prognosis is Good.     Pt will continue to benefit from skilled outpatient physical therapy to address the deficits listed in the problem list box on initial evaluation, provide pt/family education and to maximize pt's level of independence in the home and community environment.     Pt's spiritual, cultural and educational needs considered and pt agreeable to plan of care and goals.     Anticipated barriers to physical therapy: work and home stimulus with screen and noise    Goals:     Long Term Goals (6 Weeks):   1. Pt will improve FOTO to </=10% limitation to improve perceived limitation with changing and maintaining mobility.  2. Pt will improve cervical AROM to WNL in all planes to improve cervical mobility for driving   3. Pt will report no pain with cervical AROM in all planes to promote QOL.  4. Pt will have (-) roll test B.  5. Pt will have 2 weeks of no dizziness or spinning for improved vestibar function.  6. Pt will score Pass on all components of the modified CTSIB to show normal function of all elements.     Plan     Pt will continue POC as tolerated. Pt will report back on dizziness/balance loss and cervical pain next visit.     Hermelindo Lewis, PT

## 2019-11-19 ENCOUNTER — OFFICE VISIT (OUTPATIENT)
Dept: PHYSICAL MEDICINE AND REHAB | Facility: CLINIC | Age: 34
End: 2019-11-19
Payer: COMMERCIAL

## 2019-11-19 DIAGNOSIS — M54.2 CERVICAL PAIN: Primary | ICD-10-CM

## 2019-11-19 DIAGNOSIS — H81.12 BPPV (BENIGN PAROXYSMAL POSITIONAL VERTIGO), LEFT: ICD-10-CM

## 2019-11-19 PROCEDURE — 99999 PR PBB SHADOW E&M-EST. PATIENT-LVL I: ICD-10-PCS | Mod: PBBFAC,,, | Performed by: PHYSICAL MEDICINE & REHABILITATION

## 2019-11-19 PROCEDURE — 99215 OFFICE O/P EST HI 40 MIN: CPT | Mod: S$GLB,,, | Performed by: PHYSICAL MEDICINE & REHABILITATION

## 2019-11-19 PROCEDURE — 99215 PR OFFICE/OUTPT VISIT, EST, LEVL V, 40-54 MIN: ICD-10-PCS | Mod: S$GLB,,, | Performed by: PHYSICAL MEDICINE & REHABILITATION

## 2019-11-19 PROCEDURE — 99999 PR PBB SHADOW E&M-EST. PATIENT-LVL I: CPT | Mod: PBBFAC,,, | Performed by: PHYSICAL MEDICINE & REHABILITATION

## 2019-11-19 NOTE — PROGRESS NOTES
Subjective:          Chief Complaint: Hillary Fountain is a 34 y.o. female who had concerns including Concussion w/o Loc.      Ms. Hillary Fountain is a 34 year old female who was referred to PMR clinic for evaluation of her concussion from the ED, Dr Villalta.     She reports that she was walking through parking garage on 10/11 and had parking marker fall on top of her head. Reported no LOC, but had persistent pain to her scalp. She also reports associated right neck pain, non-radiating in nature. Stated that she had episodic scalp heads to the front. Doing well but two days later she hit her head along the corner of the cabinet. No LOC then but had worsening headaches, had problems with dizziness, unsteady gait and worsening confusion. Over the next few days, she reports issues of worsening dizziness, feeling of poor eliquibrium and sensation of falling over. Episode lasts for 2-3 minutes. She reports poor sleep as well. Mostly uninterrupted sleep at night. She reports some occasional double vision.     She was last seen on 10/16. Has had improvements with headaches and pain. She has started outpatient PT. Completed vestibular rehab, still undergoing therapy for neck pain. Equilibrium and double vision has improved. Otherwise, has photosensitivity which has also improved. Has not stopped working and has been doing well, still needing some occasional work breaks. Sleeping has been problematic, daughter has been ill and has been up at night. But no daytime fatigue.       Review of Systems   Constitution: Negative.   HENT: Positive for tinnitus.    Eyes: Positive for double vision and photophobia.   Cardiovascular: Positive for irregular heartbeat. Negative for chest pain, claudication and palpitations.   Respiratory: Negative.    Skin: Negative.    Musculoskeletal: Negative for falls and muscle weakness.   Neurological: Negative for excessive daytime sleepiness and light-headedness.   Psychiatric/Behavioral: Negative  for depression. The patient is not nervous/anxious.          Objective:        General: Hillary is well-developed, well-nourished, appears stated age, in no acute distress, alert and oriented to time, place and person.     General    Vitals reviewed.  Constitutional: She is oriented to person, place, and time. She appears well-developed and well-nourished.   HENT:   Head: Normocephalic and atraumatic.   Right Ear: External ear normal.   Left Ear: External ear normal.   Nose: Nose normal.   Eyes: EOM are normal. Pupils are equal, round, and reactive to light. Right eye exhibits no discharge. Left eye exhibits no discharge.   Neck: Normal range of motion. Neck supple.   Cardiovascular: Normal rate, regular rhythm and normal heart sounds.    Pulmonary/Chest: Effort normal and breath sounds normal. No respiratory distress. She has no rales.   Abdominal: Soft. Bowel sounds are normal. She exhibits no distension. There is no tenderness.   Neurological: She is alert and oriented to person, place, and time. No cranial nerve deficit. She exhibits normal muscle tone. Coordination normal.                     Assessment:       Encounter Diagnoses   Name Primary?    Cervical pain Yes    BPPV (benign paroxysmal positional vertigo), left           Plan:           Mild TBI  CTH - no acute intracranial hemorrhage  [facility] 52, did not complete this visit  PHQ -4, did not completed this visit  Continue to monitor sxs  Continue cognitive rest, avoid lights/TV/phone lights for next two weeks.  Has not stopped working    Unsteadiness in gait with poor equilibrium, improving  -completed outpatient PT     Neck Pain  C-spine XR with no fracture of dislocation  Appears to be more cervical muscle sprain vs facet pain syndrome  Continue outpatient PT    Headaches,   DDx: Scalp contusion, tension headaches, cervicogenic headaches  Continue tylenol and NSAIDs    Insomnia  -prescription for trazodone 50 mg qhs prn      RTC prn

## 2019-11-25 ENCOUNTER — TELEPHONE (OUTPATIENT)
Dept: REHABILITATION | Facility: HOSPITAL | Age: 34
End: 2019-11-25

## 2019-11-29 ENCOUNTER — CLINICAL SUPPORT (OUTPATIENT)
Dept: REHABILITATION | Facility: HOSPITAL | Age: 34
End: 2019-11-29
Payer: COMMERCIAL

## 2019-11-29 DIAGNOSIS — M54.2 CERVICAL PAIN: ICD-10-CM

## 2019-11-29 DIAGNOSIS — R68.89 SENSITIVITY TO LIGHT: ICD-10-CM

## 2019-11-29 DIAGNOSIS — H81.12 BPPV (BENIGN PAROXYSMAL POSITIONAL VERTIGO), LEFT: ICD-10-CM

## 2019-11-29 PROCEDURE — 97110 THERAPEUTIC EXERCISES: CPT | Mod: PN

## 2019-11-29 PROCEDURE — 97140 MANUAL THERAPY 1/> REGIONS: CPT | Mod: PN

## 2019-11-29 NOTE — PROGRESS NOTES
"  Physical Therapy Daily Treatment Note     Name: Hillary Fountain  Clinic Number: [id number]    Therapy Diagnosis:   Encounter Diagnoses   Name Primary?    Cervical pain     BPPV (benign paroxysmal positional vertigo), left     Sensitivity to light      Physician: Taqueria Nixon MD    Visit Date: 11/29/2019    Physician Orders: PT Eval and Treat  Medical Diagnosis: Concussion without loss of consciousness, initial encounter  Evaluation Date: 10/31/19  Authorization Period Expiration: 12/31/19  Plan of Care Certification Period: 10/31/19 to 11/29/19  Visit #/Visits authorized: 3/35     Time In: 8:05  Time Out: 8:45  Total Billable Time: 40 minutes (3 TE)    Precautions: light sensitivity     Subjective     Pt reports: was having some midline lower cervical to CT junction pain last night that she still feels some of it today. Also had a "huge" headache waking up yesterday morning that she also still feels today though not as bad, head pain was located at the top of her head and now is more frontal now. Less shadowing visual symptoms for her auras before her migraines now. Feels midline neck when she lays down.  She was compliant with home exercise program.  Response to previous treatment: Today is pt's first visit since initial evaluation  Functional change: Today is pt's first visit since initial evaluation    Pain: 3/10  Location: midline neck and head    Objective   O: full cervical AROM, midline cervical discomfort with supine laying    Hillary received therapeutic exercises to develop strength, endurance, ROM, flexibility and posture for 40 minutes including:    Chin Tucks 3x10, 5"  Supine pec stretch over 1/2 foam roll: 3'  Dowel flexion and lat stretch: 20x, 2# dowel, 5'' holds at end range  Slouch Overcorrect 10x10" - not performed today  Scapular Retractions 3x10, 5"  Levator Scap Stretch 3x30" - not performed today  Upper Trap Stretch 3x30" - not performed today  Cervical Isometrics 3x10, 5" "     Hillary received the following manual therapy techniques: Joint mobilizations and Soft tissue Mobilization were applied to the: Neck/Shoulder for 10 minutes, including:  -Scapular PROM  -Scapular Release  -Upper Trap/Levator Trap STM    Home Exercises Provided and Patient Education Provided   Education provided:   - HEP    Written Home Exercises Provided: yes.  Exercises were reviewed and Hillary was able to demonstrate them prior to the end of the session.  Hillary demonstrated good  understanding of the education provided.     See EMR under Patient Instructions for exercises provided 11/5/2019.    Assessment     Pt reported some right ear ringing during manual cervical traction that was discontinued after 1-2 minutes. Pt also reported some low back pain with seated exercises that appeared to resolve with standing. Pt instructed to keep track of symptoms and avoid over-stimulated activities especially screen activities.    Hillary is progressing well towards her goals.   Pt prognosis is Good.     Pt will continue to benefit from skilled outpatient physical therapy to address the deficits listed in the problem list box on initial evaluation, provide pt/family education and to maximize pt's level of independence in the home and community environment.     Pt's spiritual, cultural and educational needs considered and pt agreeable to plan of care and goals.     Anticipated barriers to physical therapy: work and home stimulus with screen and noise    Goals:     Long Term Goals (6 Weeks):   1. Pt will improve FOTO to </=10% limitation to improve perceived limitation with changing and maintaining mobility.  2. Pt will improve cervical AROM to WNL in all planes to improve cervical mobility for driving   3. Pt will report no pain with cervical AROM in all planes to promote QOL.  4. Pt will have (-) roll test B.  5. Pt will have 2 weeks of no dizziness or spinning for improved vestibar function.  6. Pt will score Pass on  all components of the modified CTSIB to show normal function of all elements.     Plan     Pt will continue POC as tolerated. Pt will report back on dizziness/balance loss and cervical pain next visit.     Domo Briggs, PT

## 2019-12-12 ENCOUNTER — CLINICAL SUPPORT (OUTPATIENT)
Dept: REHABILITATION | Facility: HOSPITAL | Age: 34
End: 2019-12-12
Payer: COMMERCIAL

## 2019-12-12 DIAGNOSIS — R68.89 SENSITIVITY TO LIGHT: ICD-10-CM

## 2019-12-12 DIAGNOSIS — H81.12 BPPV (BENIGN PAROXYSMAL POSITIONAL VERTIGO), LEFT: ICD-10-CM

## 2019-12-12 DIAGNOSIS — M54.2 CERVICAL PAIN: ICD-10-CM

## 2019-12-12 PROCEDURE — 97110 THERAPEUTIC EXERCISES: CPT | Mod: PN

## 2019-12-12 PROCEDURE — 97140 MANUAL THERAPY 1/> REGIONS: CPT | Mod: PN

## 2019-12-12 NOTE — PROGRESS NOTES
"  Physical Therapy Daily Treatment Note     Name: Hillary Fountain  Clinic Number: [id number]    Therapy Diagnosis:   Encounter Diagnoses   Name Primary?    Cervical pain     BPPV (benign paroxysmal positional vertigo), left     Sensitivity to light      Physician: Taqueria Nixon MD    Visit Date: 12/12/2019    Physician Orders: PT Eval and Treat  Medical Diagnosis: Concussion without loss of consciousness, initial encounter  Evaluation Date: 10/31/19  Authorization Period Expiration: 12/31/19  Plan of Care Certification Period: 10/31/19 to 11/29/19  Visit #/Visits authorized: 5/35    FOTO: 5/10 DONE    Time In: 3:15  Time Out: 4:00  Total Billable Time: 45 minutes (2 TE, 1 MT)    Precautions: light sensitivity     Subjective     Pt reports: she has been having some left upper trap and left side of neck pain since last Friday. Been reporting she has been having some nausea with bright lights especially when she drives home (since it is darker now earlier in the day).  She was compliant with home exercise program.  Response to previous treatment: Today is pt's first visit since initial evaluation  Functional change: Today is pt's first visit since initial evaluation    Pain: 4/10  Location: midline neck and head    Objective   O: full cervical AROM, midline cervical discomfort with supine laying    Hillary received therapeutic exercises to develop strength, endurance, ROM, flexibility and posture for 30 minutes including:    Chin Tucks 2x10, 5"  Supine pec stretch over 1/2 foam roll: 3'  Dowel flexion and lat stretch: 20x, 2# dowel, 5'' holds at end range  Slouch Overcorrect 10x10" - not performed today  Scapular Retractions 3x10, 5" - not performed today  Levator Scap Stretch 3x30" - not performed today  Upper Trap Stretch 3x30" - not performed today  Cervical Isometrics 3x10, 5" - Next    Hillary received the following manual therapy techniques: Joint mobilizations and Soft tissue Mobilization were applied to " the: Neck/Shoulder for 20 minutes, including:  - suboccipital release, gentle  - gentle upper cervical traction, 2 rounds    Home Exercises Provided and Patient Education Provided   Education provided:   - HEP    Written Home Exercises Provided: yes.  Exercises were reviewed and Hillary was able to demonstrate them prior to the end of the session.  Hillary demonstrated good  understanding of the education provided.     See EMR under Patient Instructions for exercises provided 11/5/2019.    Assessment     No adverse effects after manual today though did have some discomfort after chin tucks today. Minimal TE done today as increased time for subjective done. Resume cervical isometrics, manual prn, and can add in beginner core/lumbar strengthening/mobility due to some lingering midline low back pain since the accident. Can re-assess low back at later time if issue does not resolve. FOTO done.    Hillary is progressing well towards her goals.   Pt prognosis is Good.     Pt will continue to benefit from skilled outpatient physical therapy to address the deficits listed in the problem list box on initial evaluation, provide pt/family education and to maximize pt's level of independence in the home and community environment.   Pt's spiritual, cultural and educational needs considered and pt agreeable to plan of care and goals.     Anticipated barriers to physical therapy: work and home stimulus with screen and noise    Goals:   Long Term Goals (6 Weeks):   1. Pt will improve FOTO to </=10% limitation to improve perceived limitation with changing and maintaining mobility. - progressing  2. Pt will improve cervical AROM to WNL in all planes to improve cervical mobility for driving. - progressing  3. Pt will report no pain with cervical AROM in all planes to promote QOL. - progresing  4. Pt will have (-) roll test B. - progressing  5. Pt will have 2 weeks of no dizziness or spinning for improved vestibar function. -  progressing  6. Pt will score Pass on all components of the modified CTSIB to show normal function of all elements.  - progressing    Plan     Pt will continue POC as tolerated. Monitor concussion symptoms.    Domo Briggs, PT

## 2019-12-16 ENCOUNTER — CLINICAL SUPPORT (OUTPATIENT)
Dept: REHABILITATION | Facility: HOSPITAL | Age: 34
End: 2019-12-16
Payer: COMMERCIAL

## 2019-12-16 DIAGNOSIS — M54.2 CERVICAL PAIN: ICD-10-CM

## 2019-12-16 DIAGNOSIS — H81.12 BPPV (BENIGN PAROXYSMAL POSITIONAL VERTIGO), LEFT: ICD-10-CM

## 2019-12-16 DIAGNOSIS — R68.89 SENSITIVITY TO LIGHT: ICD-10-CM

## 2019-12-16 PROCEDURE — 97110 THERAPEUTIC EXERCISES: CPT | Mod: PN

## 2019-12-16 PROCEDURE — 97140 MANUAL THERAPY 1/> REGIONS: CPT | Mod: PN

## 2019-12-19 NOTE — PROGRESS NOTES
"  Physical Therapy Daily Treatment Note     Name: Hillary Fountain  Clinic Number: [id number]    Therapy Diagnosis:   Encounter Diagnoses   Name Primary?    Cervical pain     BPPV (benign paroxysmal positional vertigo), left     Sensitivity to light      Physician: Taqueria Nixon MD    Visit Date: 12/16/2019    Physician Orders: PT Eval and Treat  Medical Diagnosis: Concussion without loss of consciousness, initial encounter  Evaluation Date: 10/31/19  Authorization Period Expiration: 12/31/19  Plan of Care Certification Period: 10/31/19 to 12/31/2019  Visit #/Visits authorized: 6/35    FOTO: 6/10     Time In: 1505  Time Out: 1600  Total Billable Time: 55 minutes (3 TE, 1 MT)  Precautions: light sensitivity     Subjective     Pt reports: she has been having some left upper trap and left side of neck pain since last Friday. Been reporting she has been having some nausea with bright lights especially when she drives home (since it is darker now earlier in the day).  She was compliant with home exercise program.  Response to previous treatment: Today is pt's first visit since initial evaluation  Functional change: Today is pt's first visit since initial evaluation    Pain: 4/10  Location: midline neck and head    Objective   O: full cervical AROM, midline cervical discomfort with cervical extension (abolished with thoracic postural correction) (improved ROM by 15 degrees)       TTP along R levator scapulae course.     Hillary received therapeutic exercises to develop strength, endurance, ROM, flexibility and posture for 40 minutes including:  Chin Tucks      2x10, 5"  Supine pec stretch over 1/2 foam roll:  3'  Dowel flexion and lat stretch:    20x, 2# dowel, 5'' holds at end range  Slouch Overcorrect 10x10" -    not performed today  Scapular Retractions     3x10, 5"   Levator Scap Stretch     3x30"  Upper Trap Stretch     3x30"   Cervical Isometrics     3x10, 5"  Seated shoulder shrug    Eccentric emphasis; 20 " reps  Prone cervical retractions   20 reps    Hillary received the following manual therapy techniques:total time 15 minutes  - suboccipital release, gentle B  - gentle upper cervical traction, 2 rounds  - STM/MFR R levator scapulae    Home Exercises Provided and Patient Education Provided   Education provided:   - HEP    Written Home Exercises Provided: yes.  Exercises were reviewed and Hillary was able to demonstrate them prior to the end of the session.  Hillary demonstrated good  understanding of the education provided.     See EMR under Patient Instructions for exercises provided 11/5/2019.    Assessment   A: Continued light sensitivity following concussion.  R levator scapular strain injury following initially head injury.     Hillary is progressing well towards her goals.   Pt prognosis is Good.     Pt will continue to benefit from skilled outpatient physical therapy to address the deficits listed in the problem list box on initial evaluation, provide pt/family education and to maximize pt's level of independence in the home and community environment.   Pt's spiritual, cultural and educational needs considered and pt agreeable to plan of care and goals.     Anticipated barriers to physical therapy: work and home stimulus with screen and noise    Goals:   Long Term Goals (6 Weeks):   1. Pt will improve FOTO to </=10% limitation to improve perceived limitation with changing and maintaining mobility. - progressing  2. Pt will improve cervical AROM to WNL in all planes to improve cervical mobility for driving. - progressing  3. Pt will report no pain with cervical AROM in all planes to promote QOL. - progresing  4. Pt will have (-) roll test B. - progressing  5. Pt will have 2 weeks of no dizziness or spinning for improved vestibar function. - progressing  6. Pt will score Pass on all components of the modified CTSIB to show normal function of all elements.  - progressing    Plan     Pt will continue POC as  tolerated. Monitor concussion symptoms.    Michael Renteria, PT

## 2019-12-26 ENCOUNTER — CLINICAL SUPPORT (OUTPATIENT)
Dept: REHABILITATION | Facility: HOSPITAL | Age: 34
End: 2019-12-26
Payer: COMMERCIAL

## 2019-12-26 DIAGNOSIS — H81.12 BPPV (BENIGN PAROXYSMAL POSITIONAL VERTIGO), LEFT: ICD-10-CM

## 2019-12-26 DIAGNOSIS — M54.2 CERVICAL PAIN: ICD-10-CM

## 2019-12-26 DIAGNOSIS — R68.89 SENSITIVITY TO LIGHT: ICD-10-CM

## 2019-12-26 PROCEDURE — 97140 MANUAL THERAPY 1/> REGIONS: CPT | Mod: PN

## 2019-12-26 PROCEDURE — 97110 THERAPEUTIC EXERCISES: CPT | Mod: PN

## 2019-12-26 NOTE — PROGRESS NOTES
"  Physical Therapy Daily Treatment Note     Name: Hillary Fountain  Clinic Number: [id number]    Therapy Diagnosis:   Encounter Diagnoses   Name Primary?    Cervical pain     BPPV (benign paroxysmal positional vertigo), left     Sensitivity to light      Physician: Taqueria Nixon MD    Visit Date: 12/26/2019    Physician Orders: PT Eval and Treat  Medical Diagnosis: Concussion without loss of consciousness, initial encounter  Evaluation Date: 10/31/19  Authorization Period Expiration: 12/31/19  Plan of Care Certification Period: 10/31/19 to 12/31/2019  Visit #/Visits authorized: 7/35    FOTO: 7/10     Time In: 9:07 AM  Time Out: 10:00 AM  Total Billable Time: 53 minutes (3 TE, 1 MT)  Precautions: light sensitivity     Subjective     Pt reports: that with the first month of therapy she felt relief with light sensitivity, however recently it has been about the same. States when driving at night it still feels like the oncoming cars have their bright lights on and it bothers her a lot. States following last therapy session she had some increased pain in her right upper trap that has since resolved, however she still has a small knot-like pain that lingered near her right suboccipital region.   She was compliant with home exercise program.  Response to previous treatment: Soreness  Functional change: None stated    Pain: 4/10  Location: midline neck and head    Objective     Hillary received therapeutic exercises to develop strength, endurance, ROM, flexibility and posture for 38 minutes including:  Chin Tucks      2x10, 5"  Supine pec stretch over 1/2 foam roll:  3'  Dowel flexion and lat stretch:    20x, 2# dowel, 5'' holds at end range  Slouch Overcorrect 10x10" -    not performed today  Scapular Retractions     3x10, 5"   Levator Scap Stretch     3x30"  Upper Trap Stretch     3x30"   Cervical Isometrics     3x10, 5"  Seated shoulder shrug    Eccentric emphasis; 20 reps  Prone cervical retractions   HELD due to " increased pain    Hillary received the following manual therapy techniques:total time 15 minutes  - suboccipital release, gentle B  - gentle upper cervical traction, 2 rounds  - STM/MFR R levator scapulae in sitting with hawkgrip tool    Home Exercises Provided and Patient Education Provided   Education provided:   - HEP    Written Home Exercises Provided: yes.  Exercises were reviewed and Hillary was able to demonstrate them prior to the end of the session.  Hillary demonstrated good  understanding of the education provided.     See EMR under Patient Instructions for exercises provided 11/5/2019.    Assessment   No recent changes in light sensitivity. Tolerated all supine and seated exercises without adverse reactions. Patient continues to have complaints of pain when performing prone cervical retractions and continues to require towel over eyes during supine exercises due to light sensitivity.     Hillary is progressing well towards her goals.   Pt prognosis is Good.     Pt will continue to benefit from skilled outpatient physical therapy to address the deficits listed in the problem list box on initial evaluation, provide pt/family education and to maximize pt's level of independence in the home and community environment.   Pt's spiritual, cultural and educational needs considered and pt agreeable to plan of care and goals.     Anticipated barriers to physical therapy: work and home stimulus with screen and noise    Goals:   Long Term Goals (6 Weeks):   1. Pt will improve FOTO to </=10% limitation to improve perceived limitation with changing and maintaining mobility. - progressing  2. Pt will improve cervical AROM to WNL in all planes to improve cervical mobility for driving. - progressing  3. Pt will report no pain with cervical AROM in all planes to promote QOL. - progresing  4. Pt will have (-) roll test B. - progressing  5. Pt will have 2 weeks of no dizziness or spinning for improved vestibar function. -  progressing  6. Pt will score Pass on all components of the modified CTSIB to show normal function of all elements.  - progressing    Plan     Pt will continue POC as tolerated. Monitor concussion symptoms.  Update POC next visit.     Celi Key, PT

## 2020-01-02 ENCOUNTER — CLINICAL SUPPORT (OUTPATIENT)
Dept: REHABILITATION | Facility: HOSPITAL | Age: 35
End: 2020-01-02
Payer: COMMERCIAL

## 2020-01-02 DIAGNOSIS — R68.89 SENSITIVITY TO LIGHT: ICD-10-CM

## 2020-01-02 DIAGNOSIS — H81.12 BPPV (BENIGN PAROXYSMAL POSITIONAL VERTIGO), LEFT: ICD-10-CM

## 2020-01-02 DIAGNOSIS — M54.2 CERVICAL PAIN: ICD-10-CM

## 2020-01-02 PROCEDURE — 97140 MANUAL THERAPY 1/> REGIONS: CPT | Mod: PN

## 2020-01-02 PROCEDURE — 97110 THERAPEUTIC EXERCISES: CPT | Mod: PN

## 2020-01-02 NOTE — PROGRESS NOTES
"  Physical Therapy Daily Treatment Note     Name: Hillary Fountain  Clinic Number: [id number]    Therapy Diagnosis:   No diagnosis found.  Physician: Taqueria Nixon MD    Visit Date: 1/2/2020    Physician Orders: PT Eval and Treat  Medical Diagnosis: Concussion without loss of consciousness, initial encounter  Evaluation Date: 10/31/19  Authorization Period Expiration: 12/31/19  Plan of Care Certification Period: 10/31/19 to 12/31/2019  Visit #/Visits authorized: 7/35    FOTO: 7/10     Time In: 9:07 AM  Time Out: 10:00 AM  Total Billable Time: 53 minutes (3 TE, 1 MT)  Precautions: light sensitivity     Subjective     Pt reports: that with the first month of therapy she felt relief with light sensitivity, however recently it has been about the same. States when driving at night it still feels like the oncoming cars have their bright lights on and it bothers her a lot. States following last therapy session she had some increased pain in her right upper trap that has since resolved, however she still has a small knot-like pain that lingered near her right suboccipital region.   She was compliant with home exercise program.  Response to previous treatment: Soreness  Functional change: None stated    Pain: 4/10  Location: midline neck and head    Objective     Hillary received therapeutic exercises to develop strength, endurance, ROM, flexibility and posture for 38 minutes including:  Chin Tucks      2x10, 5"  Supine pec stretch over 1/2 foam roll:  3'  Dowel flexion and lat stretch:    20x, 2# dowel, 5'' holds at end range  Slouch Overcorrect 10x10" -    not performed today  Scapular Retractions     3x10, 5"   Levator Scap Stretch     3x30"  Upper Trap Stretch     3x30"   Cervical Isometrics     3x10, 5"  Seated shoulder shrug    Eccentric emphasis; 20 reps  Prone cervical retractions   HELD due to increased pain    Hillary received the following manual therapy techniques:total time 15 minutes  - suboccipital " release, gentle B  - gentle upper cervical traction, 2 rounds  - STM/MFR R levator scapulae in sitting with hawkgrip tool    Home Exercises Provided and Patient Education Provided   Education provided:   - HEP    Written Home Exercises Provided: yes.  Exercises were reviewed and Hillary was able to demonstrate them prior to the end of the session.  Hillary demonstrated good  understanding of the education provided.     See EMR under Patient Instructions for exercises provided 11/5/2019.    Assessment   No recent changes in light sensitivity. Tolerated all supine and seated exercises without adverse reactions. Patient continues to have complaints of pain when performing prone cervical retractions and continues to require towel over eyes during supine exercises due to light sensitivity.     Hillary is progressing well towards her goals.   Pt prognosis is Good.     Pt will continue to benefit from skilled outpatient physical therapy to address the deficits listed in the problem list box on initial evaluation, provide pt/family education and to maximize pt's level of independence in the home and community environment.   Pt's spiritual, cultural and educational needs considered and pt agreeable to plan of care and goals.     Anticipated barriers to physical therapy: work and home stimulus with screen and noise    Goals:   Long Term Goals (6 Weeks):   1. Pt will improve FOTO to </=10% limitation to improve perceived limitation with changing and maintaining mobility. - progressing  2. Pt will improve cervical AROM to WNL in all planes to improve cervical mobility for driving. - progressing  3. Pt will report no pain with cervical AROM in all planes to promote QOL. - progresing  4. Pt will have (-) roll test B. - progressing  5. Pt will have 2 weeks of no dizziness or spinning for improved vestibar function. - progressing  6. Pt will score Pass on all components of the modified CTSIB to show normal function of all elements.   - progressing    Plan     Pt will continue POC as tolerated. Monitor concussion symptoms.  Update POC next visit.     Celi Key, PT

## 2020-01-02 NOTE — PROGRESS NOTES
"  Physical Therapy Daily Treatment Note     Name: Hillary Adameming  Clinic Number: [id number]    Therapy Diagnosis:   Encounter Diagnoses   Name Primary?    Cervical pain     BPPV (benign paroxysmal positional vertigo), left     Sensitivity to light      Physician: Taqueria Nixon MD    Visit Date: 1/2/2020    Physician Orders: PT Eval and Treat  Medical Diagnosis: Concussion without loss of consciousness, initial encounter  Evaluation Date: 10/31/19  Authorization Period Expiration: 12/31/19  Plan of Care Certification Period: 10/31/19 to 1/31/2020 - updated  Visit #/Visits authorized: 8/35    FOTO: 8/10     Time In: 11:05 AM  Time Out: 12:00 PM  Total Billable Time: 55 minutes (3 TE, 1 MT)  Precautions: light sensitivity     Subjective     Pt reports: no neck pain recently and only right upper trap discomfort. Reports no slight change in headaches frequency that is occurring every other day that is less this week than usual. 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 She has a break since the change in position and is off of work until February.   She was compliant with home exercise program.  Response to previous treatment: Soreness  Functional change: None stated    Pain: 2-3/10  Location: Right upper trapezius    Objective     Hillary received therapeutic exercises to develop strength, endurance, ROM, flexibility and posture for 40 minutes including:  Chin Tucks      3x10, 5"  Supine pec stretch over 1/2 foam roll:  3'  Dowel flexion and lat stretch:    20x, 2# dowel, 5'' holds at end range  Scapular Retractions     3x10, 5"   Levator Scap Stretch     3x30"  Upper Trap Stretch     3x30"   Cervical Isometrics     3x10, 5"  Seated shoulder shrug    Eccentric emphasis; 20 reps  Prone cervical retractions   HELD due to increased pain    Hillary received the following manual therapy techniques: total time 15 minutes  - suboccipital release, gentle " B  - gentle upper cervical traction, 2 rounds  - STM/MFR R levator scapulae in sitting with [email or web address] tool    * = R upper trap and neck pain with testing  AROM: CERVICAL   Flexion 46   Extension 44   Right side bending 34   Left side bending 35< R UT stretch, no pain   Right rotation 56*   Left rotation 54     Shoulder  Right   Left  Pain/Dysfunction with Movement    AROM PROM MMT AROM PROM MMT    flexion WFL WFL 4+/5 WFL WFL 5/5    extension WFL WFL 5/5 WFL WFL 5/5    abduction WFL WFL 4/5* WFL WFL 5/5    adduction WFL WFL 5/5 WFL WFL 5/5    Internal rotation WFL WFL 5/5 WFL WFL 5/5    ER at 0° abd WFL WFL 4+/5* WFL WFL 5/5    Spurling's test: positive B for neck pain only, no radicular pain  Tenderness to palpation right mid and upper area of upper trapezius, right scalenes    Home Exercises Provided and Patient Education Provided   Education provided:   - HEP    Written Home Exercises Provided: yes.  Exercises were reviewed and Hillary was able to demonstrate them prior to the end of the session.  Hillary demonstrated good  understanding of the education provided.     See EMR under Patient Instructions for exercises provided 11/5/2019.    Assessment   Pt with R upper trap pain with right cervical rotation, and limited in cervical extension with mild cervical sidebending limitation. Pt reports tenderness of right scalenes that is mostly prominent at night and right upper trap pain is mainly during the day with overhead reaching and lifting/carrying activities. Pt appears to be improving overall with less neck pain that is now mainly only right upper trap pain that was consistent throughout today's session as well. Decreased frequency in headaches recently and will continue to monitor.    Hillary is progressing well towards her goals.   Pt prognosis is Good.     Pt will continue to benefit from skilled outpatient physical therapy to address the deficits listed in the problem list box on initial evaluation, provide  pt/family education and to maximize pt's level of independence in the home and community environment.   Pt's spiritual, cultural and educational needs considered and pt agreeable to plan of care and goals.     Anticipated barriers to physical therapy: work and home stimulus with screen and noise    Goals:   Long Term Goals (6 Weeks):   1. Pt will improve FOTO to </=10% limitation to improve perceived limitation with changing and maintaining mobility. - progressing  2. Pt will improve cervical AROM to WNL in all planes to improve cervical mobility for driving. - progressing  3. Pt will report no pain with cervical AROM in all planes to promote QOL. - progresing  4. Pt will have (-) roll test B. - progressing  5. Pt will have 2 weeks of no dizziness or spinning for improved vestibar function. - progressing  6. Pt will score Pass on all components of the modified CTSIB to show normal function of all elements.  - progressing  7. 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 - progressing    Plan     Pt will continue POC as tolerated. Monitor concussion symptoms.  Update POC next visit.     Domo Briggs, PT

## 2020-01-02 NOTE — PLAN OF CARE
Outpatient Therapy Updated Plan of Care     Visit Date: 1/2/2020  Name: Hillary Fountain  Clinic Number: [id number]    Therapy Diagnosis:   Encounter Diagnoses   Name Primary?    Cervical pain     BPPV (benign paroxysmal positional vertigo), left     Sensitivity to light      Physician: Taqueria Nixon MD    Physician Orders: PT Eval and Treat  Medical Diagnosis: Concussion without loss of consciousness, initial encounter  Evaluation Date: 10/31/19  Total Visits Received: 8    Current Certification Period:  10/31/19 to 12/31/19  Precautions:  light sensitivity  Visits from Evaluation Date:  7  Functional Level Prior to Evaluation:  Light sensitivity, neck and upper back pain    Subjective     Update: no neck pain recently and only right upper trap discomfort. Reports no slight change in headaches frequency that is occurring every other day that is less this week than usual. 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 She has a break since the change in position and is off of work until February.     Objective     Update:   * = R upper trap and neck pain with testing  AROM: CERVICAL   Flexion 46   Extension 44   Right side bending 34   Left side bending 35< R UT stretch, no pain   Right rotation 56*   Left rotation 54     Shoulder  Right   Left  Pain/Dysfunction with Movement    AROM PROM MMT AROM PROM MMT    flexion WFL WFL 4+/5 WFL WFL 5/5    extension WFL WFL 5/5 WFL WFL 5/5    abduction WFL WFL 4/5* WFL WFL 5/5    adduction WFL WFL 5/5 WFL WFL 5/5    Internal rotation WFL WFL 5/5 WFL WFL 5/5    ER at 0° abd WFL WFL 4+/5* WFL WFL 5/5    Spurling's test: positive B for neck pain only, no radicular pain  Tenderness to palpation right mid and upper area of upper trapezius, right scalenes    Assessment     Update: Pt with R upper trap pain with right cervical rotation, and limited in cervical extension with mild cervical sidebending limitation. Pt  reports tenderness of right scalenes that is mostly prominent at night and right upper trap pain is mainly during the day with overhead reaching and lifting/carrying activities. Pt appears to be improving overall with less neck pain that is now mainly only right upper trap pain that was consistent throughout today's session as well. Decreased frequency in headaches recently and will continue to monitor.    Previous Long Term Goals Status:    Long Term Goals (6 Weeks):   1. Pt will improve FOTO to </=10% limitation to improve perceived limitation with changing and maintaining mobility. - progressing  2. Pt will improve cervical AROM to WNL in all planes to improve cervical mobility for driving. - progressing  3. Pt will report no pain with cervical AROM in all planes to promote QOL. - progresing  4. Pt will have (-) roll test B. - progressing  5. Pt will have 2 weeks of no dizziness or spinning for improved vestibar function. - progressing  6. Pt will score Pass on all components of the modified CTSIB to show normal function of all elements.  - progressing  New Long Term Goals Status:     7. 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  Long Term Goal Status:   modified:  See above at new LTGs  Reasons for Recertification of Therapy:   UPOC    Plan     Updated Certification Period: 1/2/2020 to 1/31/19.  Recommended Treatment Plan: 2 times per week for 4 weeks: Aquatic Therapy, Cervical/Lumbar Traction, Electrical Stimulation IFC/Russian, Gait Training, Manual Therapy, Moist Heat/ Ice, Neuromuscular Re-ed, Orthotic Management and Training, Patient Education, Self Care, Therapeutic Activites and Therapeutic Exercise  Other Recommendations: None    Domo Briggs, PT  1/2/2020      I CERTIFY THE NEED FOR THESE SERVICES FURNISHED UNDER THIS PLAN OF TREATMENT AND WHILE UNDER MY CARE    Physician's comments:        Physician's Signature:  ___________________________________________________

## 2020-01-07 ENCOUNTER — CLINICAL SUPPORT (OUTPATIENT)
Dept: REHABILITATION | Facility: HOSPITAL | Age: 35
End: 2020-01-07
Payer: COMMERCIAL

## 2020-01-07 DIAGNOSIS — M62.81 GENERALIZED MUSCLE WEAKNESS: Primary | ICD-10-CM

## 2020-01-07 DIAGNOSIS — M54.2 CERVICAL PAIN: ICD-10-CM

## 2020-01-07 DIAGNOSIS — R68.89 SENSITIVITY TO LIGHT: ICD-10-CM

## 2020-01-07 PROCEDURE — 97140 MANUAL THERAPY 1/> REGIONS: CPT | Mod: PN

## 2020-01-07 PROCEDURE — 97110 THERAPEUTIC EXERCISES: CPT | Mod: PN

## 2020-01-07 NOTE — PROGRESS NOTES
"  Physical Therapy Daily Treatment Note     Name: Hillary Fountain  Clinic Number: [id number]    Therapy Diagnosis:   Encounter Diagnoses   Name Primary?    Generalized muscle weakness Yes    Sensitivity to light     Cervical pain      Physician: Taqueria Nixon MD    Visit Date: 1/7/2020    Physician Orders: PT Eval and Treat  Medical Diagnosis: Concussion without loss of consciousness, initial encounter  Evaluation Date: 10/31/19  Authorization Period Expiration: 12/31/19  Plan of Care Certification Period: 10/31/19 to 1/31/2020 - updated  Visit #/Visits authorized: 9/35    FOTO: 9/10     Time In: 1100  Time Out: 1200  Total Billable Time: 45 minutes (2 TE, 1 MT)  Precautions: light sensitivity     Subjective     Pt reports: Has neck pain as she  her son the other day. She also left her job as she was staring at two screens a day for too long which was getting her sick.   She was compliant with home exercise program.  Response to previous treatment: Soreness  Functional change: None stated    Pain: 3/10  Location: Right upper trapezius    Objective     Hillary received therapeutic exercises to develop strength, endurance, ROM, flexibility and posture for 30 minutes including:  Chin Tucks      3x10, 5"  Supine pec stretch over 1/2 foam roll:  3'  Dowel flexion and lat stretch:    20x, 2# dowel, 5'' holds at end range  Scapular Retractions     3x10, 5"   Levator Scap Stretch     3x30"  Upper Trap Stretch     3x30"   Cervical Isometrics     3x10, 5"  Seated shoulder shrug    Eccentric emphasis; 20 reps  Prone cervical retractions   HELD due to increased pain    Hillary received the following manual therapy techniques: total time 15 minutes  - suboccipital release, gentle B  - gentle upper cervical traction, 2 rounds  -STM/Upglids   - STM/MFR R levator scapulae in sitting with [employer]rip tool- Not performed     Home Exercises Provided and Patient Education Provided   Education provided:   - HEP    Written " Home Exercises Provided: yes.  Exercises were reviewed and Hillary was able to demonstrate them prior to the end of the session.  Hillary demonstrated good  understanding of the education provided.     See EMR under Patient Instructions for exercises provided 11/5/2019.    Assessment   Pt presented with increased nausea and headaches. She is still very sensitive to light and therapy was performed in a dark room. Pt felt headaches and tension released during STM and manual therapy. FDN was performed by James Briggs at end of session.     Hillary is progressing well towards her goals.   Pt prognosis is Good.     Pt will continue to benefit from skilled outpatient physical therapy to address the deficits listed in the problem list box on initial evaluation, provide pt/family education and to maximize pt's level of independence in the home and community environment.   Pt's spiritual, cultural and educational needs considered and pt agreeable to plan of care and goals.     Anticipated barriers to physical therapy: work and home stimulus with screen and noise    Goals:   Long Term Goals (6 Weeks):   1. Pt will improve FOTO to </=10% limitation to improve perceived limitation with changing and maintaining mobility. - progressing  2. Pt will improve cervical AROM to WNL in all planes to improve cervical mobility for driving. - progressing  3. Pt will report no pain with cervical AROM in all planes to promote QOL. - progresing  4. Pt will have (-) roll test B. - progressing  5. Pt will have 2 weeks of no dizziness or spinning for improved vestibar function. - progressing  6. Pt will score Pass on all components of the modified CTSIB to show normal function of all elements.  - progressing  7. 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 - progressing    Plan     Pt will continue POC as tolerated. Monitor concussion symptoms.  Update POC next visit.     Hermelindo Lewis, PT

## 2020-01-07 NOTE — PROGRESS NOTES
Physical Therapy Dry Needling Note       Date:  1/7/2020    Name: Hillayr Fountain  Clinic Number: [id number]    Therapy Diagnosis:   Encounter Diagnoses   Name Primary?   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 No change in sensitivity to light.  See note by Hermelindo Lewis, PT    Patient verbalized consent to FDN: yes    Objective   Palpation Assessment to determine the necessity for Functional Dry Needling. - tender B upper traps  Patient provided written and verbal consent to Functional Dry Needling, scanned into chart    Hillary received the following manual therapy techniques for 10 mins consisting of:  FDN to B upper traps with pt in prone    Home Exercises Provided and Patient Education Provided   Education provided:   - what to expect after FDN handout given    Written Handout on response to FDN provided: yes    Hillary demonstrated good  understanding of the education provided.     Assessment     Patient demonstrated appropriate response to Functional Dry Needling. Single right upper trap twitch noted today. Good rhythmical contractions observed with estim to treated muscle groups. Re-assess effectiveness next visit.    Plan     Monitor response to Functional Dry Needling. Continue with Functional Dry Needling in POC as appropriate

## 2020-01-14 ENCOUNTER — CLINICAL SUPPORT (OUTPATIENT)
Dept: REHABILITATION | Facility: HOSPITAL | Age: 35
End: 2020-01-14
Payer: COMMERCIAL

## 2020-01-14 DIAGNOSIS — R68.89 SENSITIVITY TO LIGHT: ICD-10-CM

## 2020-01-14 DIAGNOSIS — M54.2 CERVICAL PAIN: ICD-10-CM

## 2020-01-14 PROCEDURE — 97140 MANUAL THERAPY 1/> REGIONS: CPT | Mod: PN | Performed by: PHYSICAL THERAPIST

## 2020-01-14 PROCEDURE — 97110 THERAPEUTIC EXERCISES: CPT | Mod: PN | Performed by: PHYSICAL THERAPIST

## 2020-01-14 NOTE — PROGRESS NOTES
"  Physical Therapy Daily Treatment Note     Name: Hillary Fountain  Clinic Number: [id number]    Therapy Diagnosis:   Encounter Diagnoses   Name Primary?    Cervical pain     Sensitivity to light      Physician: Taqueria Nixon MD    Visit Date: 1/14/2020    Physician Orders: PT Eval and Treat  Medical Diagnosis: Concussion without loss of consciousness, initial encounter  Evaluation Date: 10/31/19  Authorization Period Expiration: 12/31/2020  Plan of Care Certification Period: 10/31/19 to 1/31/2020 - updated  Visit #/Visits authorized: 10/35    FOTO: 10/10     Time In: 1110  Time Out: 1155  Total Billable Time: 45 minutes (1 TE, 2 MT)  Precautions: light sensitivity     Subjective     Pt reports: decreased pain for 4 days following FDN previous visit. Pt reports trying to increase activity previous day but reported pain in right upper trap and arm that runs down to elbow.   She was compliant with home exercise program.  Response to previous treatment: Soreness  Functional change: None stated    Pain: 3/10  Location: Right upper trapezius    Objective     Hillary received therapeutic exercises to develop strength, endurance, ROM, flexibility and posture for 15 minutes including:  Chin Tucks      3x10, 5"  Supine pec stretch over 1/2 foam roll:  3'  Dowel flexion and lat stretch:    20x, 2# dowel, 5'' holds at end range unable to finish. HELD  Scapular Retractions     3x10, 5"   Levator Scap Stretch     3x30"  Upper Trap Stretch     3x30"   Cervical Isometrics     3x10, 5" HELD  Seated shoulder shrug    Eccentric emphasis; 20 reps HELD      Hillary received the following manual therapy techniques: total time 30 minutes  - suboccipital release, gentle B  - gentle upper cervical traction, 2 rounds  - ASTYM to right shoulder girdle  - FDN to bilateral upper trapezius    Home Exercises Provided and Patient Education Provided   Education provided:   - HEP    Written Home Exercises Provided: yes.  Exercises were " reviewed and Hillary was able to demonstrate them prior to the end of the session.  Hillary demonstrated good  understanding of the education provided.     See EMR under Patient Instructions for exercises provided 11/5/2019.    Assessment   Pt responded well to manual treatments, increased soreness to right upper trapezius following FDN. Pt tolerated therex but did have more right shoulder discomfort at times during chin tucks.    Hillary is progressing well towards her goals.   Pt prognosis is Good.     Pt will continue to benefit from skilled outpatient physical therapy to address the deficits listed in the problem list box on initial evaluation, provide pt/family education and to maximize pt's level of independence in the home and community environment.   Pt's spiritual, cultural and educational needs considered and pt agreeable to plan of care and goals.     Anticipated barriers to physical therapy: work and home stimulus with screen and noise    Goals:   Long Term Goals (6 Weeks):   1. Pt will improve FOTO to </=10% limitation to improve perceived limitation with changing and maintaining mobility. - progressing  2. Pt will improve cervical AROM to WNL in all planes to improve cervical mobility for driving. - progressing  3. Pt will report no pain with cervical AROM in all planes to promote QOL. - progresing  4. Pt will have (-) roll test B. - progressing  5. Pt will have 2 weeks of no dizziness or spinning for improved vestibar function. - progressing  6. Pt will score Pass on all components of the modified CTSIB to show normal function of all elements.  - progressing  7. 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 - progressing    Plan     Pt will continue POC as tolerated. Monitor concussion symptoms.    Kiran Hernandez, PT

## 2020-01-21 ENCOUNTER — CLINICAL SUPPORT (OUTPATIENT)
Dept: REHABILITATION | Facility: HOSPITAL | Age: 35
End: 2020-01-21
Payer: COMMERCIAL

## 2020-01-21 DIAGNOSIS — R68.89 SENSITIVITY TO LIGHT: ICD-10-CM

## 2020-01-21 DIAGNOSIS — M54.2 CERVICAL PAIN: ICD-10-CM

## 2020-01-21 PROCEDURE — 97110 THERAPEUTIC EXERCISES: CPT | Mod: PN

## 2020-01-21 NOTE — PROGRESS NOTES
"  Physical Therapy Daily Treatment Note     Name: Hillary Fountain  Clinic Number: [id number]    Therapy Diagnosis:   Encounter Diagnoses   Name Primary?    Cervical pain     Sensitivity to light      Physician: Taqueria Nixon MD    Visit Date: 1/21/2020    Physician Orders: PT Eval and Treat  Medical Diagnosis: Concussion without loss of consciousness, initial encounter  Evaluation Date: 10/31/19  Authorization Period Expiration: 12/31/2020  Plan of Care Certification Period: 10/31/19 to 1/31/2020 - updated  Visit #/Visits authorized: 11/35    FOTO: 11/20     Time In: 1005  Time Out: 1100  Total Billable Time: 55 minutes (4 TE)  Precautions: light sensitivity     Subjective     Pt reports: Not feeling any headaches or tension in her neck or UT coming in today  She was compliant with home exercise program.  Response to previous treatment: Soreness  Functional change: None stated    Pain: 3/10  Location: Right upper trapezius    Objective     Hillary received therapeutic exercises to develop strength, endurance, ROM, flexibility and posture for 15 minutes including:  Chin Tucks      3x10, 5"  Supine pec stretch over 1/2 foam roll:  3' - Corner stretch, avoided supine exercises  Dowel flexion and lat stretch:    20x, 2# dowel, 5'' holds at end range unable to finish. HELD  Scapular Retractions     3x10, 5"   Levator Scap Stretch     3x30"  Upper Trap Stretch     3x30"   Cervical Isometrics     3x10, 5" HELD  Seated shoulder shrug    Eccentric emphasis; 20 reps HELD  Bicep Curls     2x10, 4# dumbbell   Hammer Curls    2x10, 4# dumbbell   Tricep Kickback    2x10, 3# dumbbell - modify next visit to free motion pulley 3# tricep ext BUE  Shoulder   Flex     2x10   Scaption    2x10   ABD     2x10      Hillary received the following manual therapy techniques: total time 0 minutes - NOT PERFORMED TODAY  - suboccipital release, gentle B  - gentle upper cervical traction, 2 rounds  - ASTYM to right shoulder girdle  - FDN " to bilateral upper trapezius    Home Exercises Provided and Patient Education Provided   Education provided:   - HEP    Written Home Exercises Provided: yes.  Exercises were reviewed and Hillary was able to demonstrate them prior to the end of the session.  Hillary demonstrated good  understanding of the education provided.     See EMR under Patient Instructions for exercises provided 11/5/2019.    Assessment   PT and pt had discussion about pt being exposed more to light and not continuing to stay in the dark. Pt tolerated all exercises in main gym with light. Pt did not come in with any headaches or tension in neck or UT, so PT withheld manual tx and any cupping or FDN for today's visit and had pt perform therapeutic exercises within tolerance. Pt progressed with bicep curls, hammer curls, tricep kickbacks, and shoulder flex/scaption/ABD. Pt has some tingling and numbness down RUE during standing shoulder ABD. PT will continue to monitor numbness and tingling next visit.     Hillary is progressing well towards her goals.   Pt prognosis is Good.     Pt will continue to benefit from skilled outpatient physical therapy to address the deficits listed in the problem list box on initial evaluation, provide pt/family education and to maximize pt's level of independence in the home and community environment.   Pt's spiritual, cultural and educational needs considered and pt agreeable to plan of care and goals.     Anticipated barriers to physical therapy: work and home stimulus with screen and noise    Goals:   Long Term Goals (6 Weeks):   1. Pt will improve FOTO to </=10% limitation to improve perceived limitation with changing and maintaining mobility. - progressing  2. Pt will improve cervical AROM to WNL in all planes to improve cervical mobility for driving. - progressing  3. Pt will report no pain with cervical AROM in all planes to promote QOL. - progresing  4. Pt will have (-) roll test B. - progressing  5. Pt  will have 2 weeks of no dizziness or spinning for improved vestibar function. - progressing  6. Pt will score Pass on all components of the modified CTSIB to show normal function of all elements.  - progressing  7. 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 - progressing    Plan     Pt will continue POC as tolerated. Monitor concussion symptoms.    Hermelindo Lewis, PT

## 2020-01-28 ENCOUNTER — CLINICAL SUPPORT (OUTPATIENT)
Dept: REHABILITATION | Facility: HOSPITAL | Age: 35
End: 2020-01-28
Payer: COMMERCIAL

## 2020-01-28 DIAGNOSIS — R68.89 SENSITIVITY TO LIGHT: ICD-10-CM

## 2020-01-28 DIAGNOSIS — M54.2 CERVICAL PAIN: ICD-10-CM

## 2020-01-28 PROCEDURE — 97110 THERAPEUTIC EXERCISES: CPT | Mod: PN

## 2020-01-28 NOTE — PROGRESS NOTES
"  Physical Therapy Daily Treatment Note     Name: Hillary Fountain  Clinic Number: [id number]    Therapy Diagnosis:   Encounter Diagnoses   Name Primary?    Cervical pain     Sensitivity to light      Physician: Taqueria Nixon MD    Visit Date: 1/28/2020    Physician Orders: PT Eval and Treat  Medical Diagnosis: Concussion without loss of consciousness, initial encounter  Evaluation Date: 10/31/19  Authorization Period Expiration: 12/31/2020  Plan of Care Certification Period: 10/31/19 to 1/31/2020 - updated  Visit #/Visits authorized: 12/35    FOTO: 12/20     Time In: 11:05  Time Out: 12:00  Total Billable Time: 55 minutes (4 TE)  Precautions: light sensitivity     Subjective     Pt reports: feeling some increased R sided low back pain, moderate right sided neck pain  She was compliant with home exercise program.  Response to previous treatment: Soreness  Functional change: None stated    Pain: 7/10  Location: Right upper trapezius    Objective     Hillary received therapeutic exercises to develop strength, endurance, ROM, flexibility and posture for 55 minutes including:  Chin Tucks      3x10, 5"  Supine pec stretch over 1/2 foam roll:  3' - Corner stretch, avoided supine exercises  Dowel flexion and lat stretch:    20x, 2# dowel, 5'' holds at end range unable to finish. HELD  Scapular Retractions     3x10, 5"   Levator Scap Stretch     3x30"  Upper Trap Stretch     3x30"   Cervical Isometrics     2x10, 5"   Seated shoulder shrug    Eccentric emphasis; 20 reps HELD  Bicep Curls     2x10, 4# dumbbell - not done today  Hammer Curls    2x10, 4# dumbbell - not done today  Tricep Kickback    2x10, 3# dumbbell - modify next visit to free motion pulley 3# tricep ext BUE - not done today  Shoulder   Flex     2x10 - not done today   Scaption    2x10 - not done today   ABD     2x10 - not done today  Middle trap cable rows   2x10, 7# B  Cable lat pull downs    2x10, 3# B      Hillary received the following manual " therapy techniques: total time 0 minutes - NOT PERFORMED TODAY  - suboccipital release, gentle B  - gentle upper cervical traction, 2 rounds  - ASTYM to right shoulder girdle  - FDN to bilateral upper trapezius    Home Exercises Provided and Patient Education Provided   Education provided:   - HEP    Written Home Exercises Provided: yes.  Exercises were reviewed and Hillary was able to demonstrate them prior to the end of the session.  Hillary demonstrated good  understanding of the education provided.     See EMR under Patient Instructions for exercises provided 11/5/2019.    Assessment   Pt reports she has been having episodic numbness and tingling in R dorsum of hand and finger tips, and worsens with activity. She did fairly well today overall and was able to complete all TE. She reported earlier in the session that she though the dumbbell exercises aggravated her right hand and shoulder, but unsure overall. Arm exercises skipped today out of caution and assess today's response next visit.    Hillary is progressing well towards her goals.   Pt prognosis is Good.     Pt will continue to benefit from skilled outpatient physical therapy to address the deficits listed in the problem list box on initial evaluation, provide pt/family education and to maximize pt's level of independence in the home and community environment.   Pt's spiritual, cultural and educational needs considered and pt agreeable to plan of care and goals.     Anticipated barriers to physical therapy: work and home stimulus with screen and noise    Goals:   Long Term Goals (6 Weeks):   1. Pt will improve FOTO to </=10% limitation to improve perceived limitation with changing and maintaining mobility. - progressing  2. Pt will improve cervical AROM to WNL in all planes to improve cervical mobility for driving. - progressing  3. Pt will report no pain with cervical AROM in all planes to promote QOL. - progresing  4. Pt will have (-) roll test B. -  progressing  5. Pt will have 2 weeks of no dizziness or spinning for improved vestibar function. - progressing  6. Pt will score Pass on all components of the modified CTSIB to show normal function of all elements.  - progressing  7. 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 - progressing    Plan     Pt will continue POC as tolerated. Monitor concussion symptoms.    Domo Briggs, PT

## 2020-02-05 ENCOUNTER — OFFICE VISIT (OUTPATIENT)
Dept: PHYSICAL MEDICINE AND REHAB | Facility: CLINIC | Age: 35
End: 2020-02-05
Payer: COMMERCIAL

## 2020-02-05 VITALS — HEIGHT: 62 IN | WEIGHT: 150 LBS | BODY MASS INDEX: 27.6 KG/M2

## 2020-02-05 DIAGNOSIS — M54.2 CERVICAL PAIN: ICD-10-CM

## 2020-02-05 DIAGNOSIS — S06.0X0A CONCUSSION WITHOUT LOSS OF CONSCIOUSNESS, INITIAL ENCOUNTER: ICD-10-CM

## 2020-02-05 DIAGNOSIS — R68.89 SENSITIVITY TO LIGHT: Primary | ICD-10-CM

## 2020-02-05 PROCEDURE — 99999 PR PBB SHADOW E&M-EST. PATIENT-LVL III: CPT | Mod: PBBFAC,,, | Performed by: PHYSICAL MEDICINE & REHABILITATION

## 2020-02-05 PROCEDURE — 3008F BODY MASS INDEX DOCD: CPT | Mod: CPTII,S$GLB,, | Performed by: PHYSICAL MEDICINE & REHABILITATION

## 2020-02-05 PROCEDURE — 3008F PR BODY MASS INDEX (BMI) DOCUMENTED: ICD-10-PCS | Mod: CPTII,S$GLB,, | Performed by: PHYSICAL MEDICINE & REHABILITATION

## 2020-02-05 PROCEDURE — 99999 PR PBB SHADOW E&M-EST. PATIENT-LVL III: ICD-10-PCS | Mod: PBBFAC,,, | Performed by: PHYSICAL MEDICINE & REHABILITATION

## 2020-02-05 PROCEDURE — 99215 PR OFFICE/OUTPT VISIT, EST, LEVL V, 40-54 MIN: ICD-10-PCS | Mod: S$GLB,,, | Performed by: PHYSICAL MEDICINE & REHABILITATION

## 2020-02-05 PROCEDURE — 99215 OFFICE O/P EST HI 40 MIN: CPT | Mod: S$GLB,,, | Performed by: PHYSICAL MEDICINE & REHABILITATION

## 2020-02-06 NOTE — PROGRESS NOTES
"  CONCUSSION VISIT  [facility]      Chief Complaint   Patient presents with    Follow-up     Referred by: No ref. provider found  Date of Encounter: 02/05/2020        History of Present Illness:    Ms. Hillary Fountain is a 34 year old female who was referred to PMR clinic for evaluation of her concussion from the ED, Dr Villalta.      She reports that she was walking through parking garage on 10/11 and had parking marker fall on top of her head. Reported no LOC, but had persistent pain to her scalp. She also reports associated right neck pain, non-radiating in nature. Stated that she had episodic scalp heads to the front. Doing well but two days later she hit her head along the corner of the cabinet. No LOC then but had worsening headaches, had problems with dizziness, unsteady gait and worsening confusion. Over the next few days, she reports issues of worsening dizziness, feeling of poor eliquibrium and sensation of falling over. Episode lasts for 2-3 minutes. She reports poor sleep as well. Mostly uninterrupted sleep at night. She reports some occasional double vision.      She was last seen on 10/16. Has had improvements with headaches and pain. She has started outpatient PT. Completed vestibular rehab, still undergoing therapy for neck pain. Equilibrium and double vision has improved. Otherwise, has photosensitivity which has also improved. Has not stopped working and has been doing well, still needing some occasional work breaks. Sleeping has been problematic, daughter has been ill and has been up at night. But no daytime fatigue.        Mechanism of Injury: Fall    Presents today mostly for photosensitivity.  Mostly at night with "dark spots presents."  Still has neck pain with improvement with dry needling.  Has aggravation with heavy lifting and activity.  Otherwise, moving towards new job.    Review of Systems   Eyes: Positive for photophobia.       Vitals reviewed.  Constitutional: She is oriented to " person, place, and time. She appears well-developed and well-nourished.   HENT:   Head: Normocephalic and atraumatic.   Right Ear: External ear normal.   Left Ear: External ear normal.   Nose: Nose normal.   Eyes: EOM are normal. Pupils are equal, round, and reactive to light. Right eye exhibits no discharge. Left eye exhibits no discharge.   Neck: Normal range of motion. Neck supple.   Cardiovascular: Normal rate, regular rhythm and normal heart sounds.    Pulmonary/Chest: Effort normal and breath sounds normal. No respiratory distress. She has no rales.   Abdominal: Soft. Bowel sounds are normal. She exhibits no distension. There is no tenderness.   Neurological: She is alert and oriented to person, place, and time. No cranial nerve deficit. She exhibits normal muscle tone. Coordination normal.     IMAGING RESULTS:      Sensitivity to light  Comments:  Discussed expectation for photosensitivity.  May be due to vision itself.   Referral to optho  Orders:  -     Ambulatory referral/consult to Ophthalmology; Future; Expected date: 02/12/2020    Cervical pain  Comments:  Continue with outpatient PT.  Warned patient about aggressive actvity.  Discussed red flag signs.     Concussion without loss of consciousness, initial encounter  Comments:  -improving.  -no score taken today         The patient was educated during the encounter on the prognosis of recovery from concussion or mild traumatic brain injury. Patient should refrain from the use of lighted electronic devices (including televisions, smartphones, computer devices, and videogames consoles). The patient was encouraged to use sunglasses to reduce effects of light sensitivity. The patient was also educated on the importance of cognitive rest and the signs of mental fatigue after suffering from a concussion. The patient was also highly encouraged to maintain a consistent sleep scheduled to assist with symptom resolution and brain recovery. The patient voiced and  expressed understanding.     RTC: prn

## 2020-02-10 ENCOUNTER — OFFICE VISIT (OUTPATIENT)
Dept: OPTOMETRY | Facility: CLINIC | Age: 35
End: 2020-02-10
Payer: COMMERCIAL

## 2020-02-10 DIAGNOSIS — H52.202 MYOPIA WITH ASTIGMATISM, LEFT: ICD-10-CM

## 2020-02-10 DIAGNOSIS — H52.11 MYOPIA OF RIGHT EYE: ICD-10-CM

## 2020-02-10 DIAGNOSIS — H53.9 VISUAL DISTURBANCES: Primary | ICD-10-CM

## 2020-02-10 DIAGNOSIS — H52.12 MYOPIA WITH ASTIGMATISM, LEFT: ICD-10-CM

## 2020-02-10 DIAGNOSIS — H43.393 VISUAL FLOATERS, BILATERAL: ICD-10-CM

## 2020-02-10 PROCEDURE — 92004 PR EYE EXAM, NEW PATIENT,COMPREHESV: ICD-10-PCS | Mod: S$GLB,,, | Performed by: OPTOMETRIST

## 2020-02-10 PROCEDURE — 92004 COMPRE OPH EXAM NEW PT 1/>: CPT | Mod: S$GLB,,, | Performed by: OPTOMETRIST

## 2020-02-10 PROCEDURE — 92015 PR REFRACTION: ICD-10-PCS | Mod: S$GLB,,, | Performed by: OPTOMETRIST

## 2020-02-10 PROCEDURE — 92015 DETERMINE REFRACTIVE STATE: CPT | Mod: S$GLB,,, | Performed by: OPTOMETRIST

## 2020-02-10 PROCEDURE — 99999 PR PBB SHADOW E&M-EST. PATIENT-LVL III: CPT | Mod: PBBFAC,,, | Performed by: OPTOMETRIST

## 2020-02-10 PROCEDURE — 99999 PR PBB SHADOW E&M-EST. PATIENT-LVL III: ICD-10-PCS | Mod: PBBFAC,,, | Performed by: OPTOMETRIST

## 2020-02-10 NOTE — PROGRESS NOTES
HPI     [place]: October 2019, Patient had concussion 2/2 injury. A parking handle   hit the top of her head. Since patient has had increased light sensitivity   and shadows in [place]. OD blurry. Patient is in PT for injury  Glasses? Yes  Contacts? Yes  H/o eye surgery, injections or laser: No  H/o eye injury: No  Known eye conditions? No  Family h/o eye conditions? Paternal Uncle = Glaucoma  Eye gtts? No    (-) Flashes (+)  Floaters (-) Mucous   (+)  Tearing (-) Itching (-) Burning   (-) Headaches (-) Eye Pain/discomfort (-) Irritation   (-)  Redness (-) Double vision (+) Blurry vision (-) photophobia   Pt reports that her vision takes a while to recover after having light   shined in her eyes.     Diabetic? No  A1c? N/A    Last edited by Martha Fuentes, OD on 2/10/2020 11:53 AM. (History)            Assessment /Plan     For exam results, see Encounter Report.    Visual disturbances    Visual floaters, bilateral    Myopia with astigmatism, left    Myopia of right eye    1. Pt reports shadows of black in vision that are not floaters since concussion Nov 2019. Pt reports delay in visual recovery after bright lights but length of time today in office was normal. Pt is not photophobic w/lights used today. RTC 1 mo HVF/IOP  2. No e/o h/b/t 360 degrees OU. Monitor for worsening of symptoms or S/Sx of RD.   3-4. SRx released to patient. Patient educated on lens options. Normal ocular health. RTC 1 year for routine exam.

## 2020-02-11 ENCOUNTER — OFFICE VISIT (OUTPATIENT)
Dept: URGENT CARE | Facility: CLINIC | Age: 35
End: 2020-02-11
Payer: COMMERCIAL

## 2020-02-11 ENCOUNTER — PATIENT MESSAGE (OUTPATIENT)
Dept: OPTOMETRY | Facility: CLINIC | Age: 35
End: 2020-02-11

## 2020-02-11 VITALS
HEART RATE: 77 BPM | SYSTOLIC BLOOD PRESSURE: 106 MMHG | WEIGHT: 150 LBS | TEMPERATURE: 97 F | HEIGHT: 62 IN | RESPIRATION RATE: 16 BRPM | DIASTOLIC BLOOD PRESSURE: 55 MMHG | BODY MASS INDEX: 27.6 KG/M2 | OXYGEN SATURATION: 97 %

## 2020-02-11 DIAGNOSIS — M79.675 TOE PAIN, LEFT: Primary | ICD-10-CM

## 2020-02-11 PROCEDURE — 99214 OFFICE O/P EST MOD 30 MIN: CPT | Mod: S$GLB,,, | Performed by: NURSE PRACTITIONER

## 2020-02-11 PROCEDURE — 99214 PR OFFICE/OUTPT VISIT, EST, LEVL IV, 30-39 MIN: ICD-10-PCS | Mod: S$GLB,,, | Performed by: NURSE PRACTITIONER

## 2020-02-11 PROCEDURE — 73660 XR TOE 2 OR MORE VIEWS LEFT: ICD-10-PCS | Mod: FY,LT,S$GLB, | Performed by: RADIOLOGY

## 2020-02-11 PROCEDURE — 73660 X-RAY EXAM OF TOE(S): CPT | Mod: FY,LT,S$GLB, | Performed by: RADIOLOGY

## 2020-02-11 NOTE — PROGRESS NOTES
"Subjective:       Patient ID: Hillary Fountain is a 34 y.o. female.    Vitals:  height is 5' 2" (1.575 m) and weight is 68 kg (150 lb). Her oral temperature is 97.2 °F (36.2 °C). Her blood pressure is 106/55 (abnormal) and her pulse is 77. Her respiration is 16 and oxygen saturation is 97%.     Chief Complaint: Toe Injury    Patient states she hit left middle on door while running after her dog.    Toe Injury   This is a new problem. Episode onset: 5 days ago. The problem occurs constantly. The problem has been unchanged. Pertinent negatives include no abdominal pain, anorexia, arthralgias, change in bowel habit, chest pain, chills, congestion, coughing, diaphoresis, fatigue, fever, headaches, joint swelling, myalgias, nausea, neck pain, numbness, rash, sore throat, swollen glands, urinary symptoms, vertigo, visual change, vomiting or weakness. The symptoms are aggravated by walking. She has tried ice for the symptoms. The treatment provided mild relief.       Constitution: Negative for chills, sweating, fatigue and fever.   HENT: Negative for congestion and sore throat.    Neck: Negative for neck pain and painful lymph nodes.   Cardiovascular: Negative for chest pain and leg swelling.   Eyes: Negative for double vision and blurred vision.   Respiratory: Negative for cough and shortness of breath.    Gastrointestinal: Negative for abdominal pain, nausea, vomiting and diarrhea.   Genitourinary: Negative for dysuria, frequency, urgency and history of kidney stones.   Musculoskeletal: Negative for joint pain, joint swelling, muscle cramps and muscle ache.   Skin: Negative for color change, pale, rash and bruising.   Allergic/Immunologic: Negative for seasonal allergies.   Neurological: Negative for dizziness, history of vertigo, light-headedness, passing out, headaches and numbness.   Hematologic/Lymphatic: Negative for swollen lymph nodes.   Psychiatric/Behavioral: Negative for nervous/anxious, sleep disturbance and " depression. The patient is not nervous/anxious.        Objective:      Physical Exam   Constitutional: She is oriented to person, place, and time. She appears well-developed and well-nourished. She is cooperative.  Non-toxic appearance. She does not appear ill. No distress.   HENT:   Head: Normocephalic and atraumatic.   Right Ear: Hearing, tympanic membrane, external ear and ear canal normal.   Left Ear: Hearing, tympanic membrane, external ear and ear canal normal.   Nose: Nose normal. No mucosal edema, rhinorrhea or nasal deformity. No epistaxis. Right sinus exhibits no maxillary sinus tenderness and no frontal sinus tenderness. Left sinus exhibits no maxillary sinus tenderness and no frontal sinus tenderness.   Mouth/Throat: Uvula is midline, oropharynx is clear and moist and mucous membranes are normal. No trismus in the jaw. Normal dentition. No uvula swelling. No posterior oropharyngeal erythema.   Eyes: Conjunctivae and lids are normal. Right eye exhibits no discharge. Left eye exhibits no discharge. No scleral icterus.   Neck: Trachea normal, normal range of motion, full passive range of motion without pain and phonation normal. Neck supple.   Cardiovascular: Normal rate, regular rhythm, normal heart sounds, intact distal pulses and normal pulses.   Pulmonary/Chest: Effort normal and breath sounds normal. No respiratory distress.   Abdominal: Soft. Normal appearance and bowel sounds are normal. She exhibits no distension, no pulsatile midline mass and no mass. There is no tenderness.   Musculoskeletal: She exhibits no edema or deformity.        Left foot: There is decreased range of motion, tenderness (ecchymosis and tenderness to palpation of the third digit), bony tenderness and swelling.        Feet:    Neurological: She is alert and oriented to person, place, and time. She exhibits normal muscle tone. Coordination normal.   Skin: Skin is warm, dry, intact, not diaphoretic and not pale.   Psychiatric:  She has a normal mood and affect. Her speech is normal and behavior is normal. Judgment and thought content normal. Cognition and memory are normal.   Nursing note and vitals reviewed.        Assessment:       1. Toe pain, left        Plan:         Toe pain, left  -     X-Ray Toe 2 or More Views Left; Future; Expected date: 02/11/2020  -     Ambulatory referral/consult to Podiatry          Patient Instructions     Please follow up with orthopedics as instructed. If you have been given a referral,  will call you to select an appointment date, time, location, and health care provider. If  does not call you, please call [phone] to set up your appointment. If you have taken x-rays today and would like a CD of your images, one can be provided for you.    Please return here or go to the Emergency Department for any concerns or worsening of condition.  If you were given a splint wear it at all times unless otherwise instructed.     If you were given crutches use them as we instructed. Do not rest your armpits on the foam pad or you risk compressing the nerves and the vessels there.    If you have been given a referral to orthopedics, I will NOT release you from restrictions for work or school duties. Orthopedics must clear you for full use if you have been referred-this is to protect and preserve your full use/function of that extremity/joint in the future.    If you lose control of your bowel and/or bladder, please go to the nearest Emergency Department immediately.  If you lose sensation in between your legs by your genitalia and/or rectum, please go to the nearest Emergency Department immediately.  If you lose control or sensation of any extremity, please go to the nearest Emergency Department immediately.    R.I.C.E.T. to the affected joint or limb as needed:    REST  Rest- the injured or sore extremity, this includes the use of an immobilization device you may have been given in clinic.   ICE  Ice- for the next 24-48 hours, alternating 20 minutes on and 20 minutes off as needed up to 3 times a day. Don't use ice packs on the left shoulder if you have a heart condition, and don't use ice packs around the front or side of the neck. Heat treatments should be used for chronic/older conditions to help relax and loosen tissues and to stimulate blood flow to the area. Use heat treatments for conditions such as overuse injuries before participating in activities.  When using heat treatments, be very careful to use a moderate heat for a limited time to avoid burns. Never leave heating pads or towels on for extended periods of time or while sleeping.   COMPRESSION Compression-Use an ACE wrap to provide compression to the injured extremity. Copper-infused compression garments are available over-the-counter at [employer], and other drug "Mobile Location, IP" and may provide just the right amount of compression and reduce the likeliness of having to frequently adjust a bandage for comfort. Check circulation to make sure your bandage or compression device is not too tight.     ELEVATION Elevate-when possible to reduce swelling.     TOPICALS Topical relief: Tiger Balm may be used as directed on the label. Wash your hands before and after applying this medicine (do not get this medication in your eyes). For your first use, apply only to a small skin area to test how your skin reacts to the medicine. Tiger Balm contains camphor and menthol and this can cause a burning or cold sensation, which is usually mild and should lessen over time with continued use. If this sensation causes significant discomfort, wash the skin with soap and water.  Epsom salt: In water, Epsom salt breaks down into magnesium and sulfate. The theory is that when you soak in an Epsom salt bath, these get into your body through your skin. That hasn't been proven, but just soaking in warm water can help relax muscles and loosen stiff joints.       Why  didn't I get a steroid shot or a medrol dose pack?  The benefits are small and, unlike topical glucocorticoids, systemic glucocorticoids possess a significant side effect profile. Major side effects include:     Skin consequences: Skin thinning and ecchymoses, Cushingoid appearance (rounded face), acne, weight gain, mild hirsutism, facial erythema, and striae.   Eye consequences: Cataracts, increased intraocular pressure, exophthalmos.    Cardiovascular consequences: Fluid retention, premature atherosclerotic disease, and arrhythmias.    GI consequences: Increased risk for adverse gastrointestinal effects, such as gastritis, ulcer formation, and gastrointestinal bleeding   Bone and muscle consequences: Osteoporosis, osteonecrosis, and myopathy.   Neuropsychiatric effects: Mood disorders, psychosis, memory impairment, and    Metabolic and Endocrine consequences: Suppress the hypothalamic-pituitary-adrenal (HPA) axis and increase blood sugar.   Effects immunity predisposing you to getting a more severe infection and increases your white blood cell count.   Young children -- Growth impairment        Rotate Tylenol and Motrin for pain and inflammation.

## 2020-02-11 NOTE — PATIENT INSTRUCTIONS
Please follow up with orthopedics as instructed. If you have been given a referral, [facility] will call you to select an appointment date, time, location, and health care provider. If [facility] does not call you, please call [phone] to set up your appointment. If you have taken x-rays today and would like a CD of your images, one can be provided for you.    Please return here or go to the Emergency Department for any concerns or worsening of condition.  If you were given a splint wear it at all times unless otherwise instructed.     If you were given crutches use them as we instructed. Do not rest your armpits on the foam pad or you risk compressing the nerves and the vessels there.    If you have been given a referral to orthopedics, I will NOT release you from restrictions for work or school duties. Orthopedics must clear you for full use if you have been referred-this is to protect and preserve your full use/function of that extremity/joint in the future.    If you lose control of your bowel and/or bladder, please go to the nearest Emergency Department immediately.  If you lose sensation in between your legs by your genitalia and/or rectum, please go to the nearest Emergency Department immediately.  If you lose control or sensation of any extremity, please go to the nearest Emergency Department immediately.    R.I.C.E.T. to the affected joint or limb as needed:    REST  Rest- the injured or sore extremity, this includes the use of an immobilization device you may have been given in clinic.   ICE Ice- for the next 24-48 hours, alternating 20 minutes on and 20 minutes off as needed up to 3 times a day. Don't use ice packs on the left shoulder if you have a heart condition, and don't use ice packs around the front or side of the neck. Heat treatments should be used for chronic/older conditions to help relax and loosen tissues and to stimulate blood flow to the area. Use heat treatments for conditions such as  overuse injuries before participating in activities.  When using heat treatments, be very careful to use a moderate heat for a limited time to avoid burns. Never leave heating pads or towels on for extended periods of time or while sleeping.   COMPRESSION Compression-Use an ACE wrap to provide compression to the injured extremity. Copper-infused compression garments are available over-the-counter at [facility], and other drug stores and may provide just the right amount of compression and reduce the likeliness of having to frequently adjust a bandage for comfort. Check circulation to make sure your bandage or compression device is not too tight.     ELEVATION Elevate-when possible to reduce swelling.     TOPICALS Topical relief: Tiger Balm may be used as directed on the label. Wash your hands before and after applying this medicine (do not get this medication in your eyes). For your first use, apply only to a small skin area to test how your skin reacts to the medicine. Tiger Balm contains camphor and menthol and this can cause a burning or cold sensation, which is usually mild and should lessen over time with continued use. If this sensation causes significant discomfort, wash the skin with soap and water.  Epsom salt: In water, Epsom salt breaks down into magnesium and sulfate. The theory is that when you soak in an Epsom salt bath, these get into your body through your skin. That hasn't been proven, but just soaking in warm water can help relax muscles and loosen stiff joints.       Why didn't I get a steroid shot or a medrol dose pack?  The benefits are small and, unlike topical glucocorticoids, systemic glucocorticoids possess a significant side effect profile. Major side effects include:     Skin consequences: Skin thinning and ecchymoses, Cushingoid appearance (rounded face), acne, weight gain, mild hirsutism, facial erythema, and striae.   Eye consequences: Cataracts, increased intraocular  pressure, exophthalmos.    Cardiovascular consequences: Fluid retention, premature atherosclerotic disease, and arrhythmias.    GI consequences: Increased risk for adverse gastrointestinal effects, such as gastritis, ulcer formation, and gastrointestinal bleeding   Bone and muscle consequences: Osteoporosis, osteonecrosis, and myopathy.   Neuropsychiatric effects: Mood disorders, psychosis, memory impairment, and    Metabolic and Endocrine consequences: Suppress the hypothalamic-pituitary-adrenal (HPA) axis and increase blood sugar.   Effects immunity predisposing you to getting a more severe infection and increases your white blood cell count.   Young children -- Growth impairment        Rotate Tylenol and Motrin for pain and inflammation.

## 2020-02-14 ENCOUNTER — TELEPHONE (OUTPATIENT)
Dept: URGENT CARE | Facility: CLINIC | Age: 35
End: 2020-02-14

## 2020-02-14 NOTE — TELEPHONE ENCOUNTER
Call back DOS 02/11/20 - Spoke with patient, she stated that she is not doing much better. She stated that she decided to wait for a little while before following up with podiatry.

## 2020-03-09 ENCOUNTER — OFFICE VISIT (OUTPATIENT)
Dept: PODIATRY | Facility: CLINIC | Age: 35
End: 2020-03-09
Payer: COMMERCIAL

## 2020-03-09 VITALS
WEIGHT: 149.94 LBS | DIASTOLIC BLOOD PRESSURE: 60 MMHG | HEIGHT: 62 IN | SYSTOLIC BLOOD PRESSURE: 111 MMHG | HEART RATE: 66 BPM | BODY MASS INDEX: 27.59 KG/M2

## 2020-03-09 DIAGNOSIS — M79.675 PAIN OF TOE OF LEFT FOOT: Primary | ICD-10-CM

## 2020-03-09 DIAGNOSIS — S99.922S TOE INJURY, LEFT, SEQUELA: ICD-10-CM

## 2020-03-09 PROCEDURE — 99203 PR OFFICE/OUTPT VISIT, NEW, LEVL III, 30-44 MIN: ICD-10-PCS | Mod: S$GLB,,, | Performed by: PODIATRIST

## 2020-03-09 PROCEDURE — 99999 PR PBB SHADOW E&M-EST. PATIENT-LVL III: ICD-10-PCS | Mod: PBBFAC,,, | Performed by: PODIATRIST

## 2020-03-09 PROCEDURE — 99213 OFFICE O/P EST LOW 20 MIN: CPT | Mod: PBBFAC,PN | Performed by: PODIATRIST

## 2020-03-09 PROCEDURE — 99203 OFFICE O/P NEW LOW 30 MIN: CPT | Mod: S$GLB,,, | Performed by: PODIATRIST

## 2020-03-09 PROCEDURE — 99999 PR PBB SHADOW E&M-EST. PATIENT-LVL III: CPT | Mod: PBBFAC,,, | Performed by: PODIATRIST

## 2020-03-09 NOTE — LETTER
March 9, 2020    Hillary Fountain  [address] [facility] [address]         [place] [facility]  [address] [address]  Phone: [phone]  Fax: [phone] March 9, 2020     Patient: Hillary Fountain   YOB: 1985   Date of Visit: 3/9/2020       To Whom It May Concern:    It is my medical opinion that Hillary Fountain may return to light duty immediately with the following restrictions: patient must wear a Post op shoe and athletic shoes until her next visist. .    If you have any questions or concerns, please don't hesitate to call.    Sincerely,        Amy Coreas DPM

## 2020-03-09 NOTE — PROGRESS NOTES
Chief Complaint   Patient presents with    Foot Pain     2/10 pain after walking            HPI:   Hillary Fountain is a 34 y.o. female with complaints of  left 3rd toe pain/swelling, getting better but still notes swelling.  Bruising has improved after her injury about a month ago.  Pain worse with increased activity.   Xrays done in urgent care.    She has been wearing various shoes.   No fracture shoe.         Patient Active Problem List   Diagnosis    Previous  section    Ureteral stone    Right flank pain    Cervical pain    BPPV (benign paroxysmal positional vertigo), left    Sensitivity to light           Current Outpatient Medications on File Prior to Visit   Medication Sig Dispense Refill    polycarbophil (FIBERCON) 625 mg tablet Take 625 mg by mouth once daily.      ibuprofen (ADVIL,MOTRIN) 200 MG tablet Take 200 mg by mouth every 6 (six) hours as needed for Pain.      meclizine (ANTIVERT) 12.5 mg tablet Take 1 tablet (12.5 mg total) by mouth 3 (three) times daily as needed for Dizziness or Nausea. 30 tablet 0    naproxen (NAPROSYN) 250 MG tablet Take 250 mg by mouth 2 (two) times daily.      traZODone (DESYREL) 50 MG tablet Take 1 tablet (50 mg total) by mouth every evening. 30 tablet 0     No current facility-administered medications on file prior to visit.            Review of patient's allergies indicates:   Allergen Reactions    Lactose            Social History     Socioeconomic History    Marital status:      Spouse name: Not on file    Number of children: Not on file    Years of education: Not on file    Highest education level: Not on file   Occupational History    Not on file   Social Needs    Financial resource strain: Not on file    Food insecurity:     Worry: Not on file     Inability: Not on file    Transportation needs:     Medical: Not on file     Non-medical: Not on file   Tobacco Use    Smoking status: Never Smoker    Smokeless tobacco: Never Used  "  Substance and Sexual Activity    Alcohol use: No    Drug use: No    Sexual activity: Yes     Partners: Male     Birth control/protection: None     Comment: :    Lifestyle    Physical activity:     Days per week: Not on file     Minutes per session: Not on file    Stress: Not on file   Relationships    Social connections:     Talks on phone: Not on file     Gets together: Not on file     Attends Taoism service: Not on file     Active member of club or organization: Not on file     Attends meetings of clubs or organizations: Not on file     Relationship status: Not on file   Other Topics Concern    Not on file   Social History Narrative    Not on file             ROS:   General ROS: negative for - chills, fever or night sweats  Respiratory ROS: no cough, shortness of breath, or wheezing  Cardiovascular ROS: no chest pain or dyspnea on exertion  Musculoskeletal ROS: negative  Neurological ROS: no TIA or stroke symptoms  Dermatological ROS: negative      EXAM:     Vitals:    03/09/20 1621   BP: 111/60   BP Location: Right arm   Patient Position: Sitting   BP Method: Medium (Automatic)   Pulse: 66   Weight: 68 kg (149 lb 14.6 oz)   Height: 5' 2" (1.575 m)        General:  Alert, oriented, no acute distress      Left  Lower extremity exam:    Vascular:   Dorsalis Pedis:  present   Posterior Tibial:  present  Capillary refill time:  3 seconds  Temperature of toes warm to touch  Edema:  Trace and non-pitting       Neurological:     Sharp touch:  normal  Light touch: normal  Tinels Sign:  Absent  Mulders Click:   Absent        Dermatological:   Skin: Normal tone and turgor  Wounds/Ulcers:  Absent  Bruising:  Absent  Erythema:  Absent  Toenails normal appearing without paronychia or discoloration      Musculoskeletal:   Metatarsophalangeal range of motion:   full range of motion  Subtalar joint range of motion: full range of motion  Ankle joint range of motion:  full range of motion  Bunions:  " Absent  Marija: Absent            2/11/2020  Imaging:    Impression       Small avulsion fracture fragment at the dorsal aspect of the base of the 3rd middle phalanx                 ASSESSMENT/PLAN:          Problem List Items Addressed This Visit     None      Visit Diagnoses     Pain of toe of left foot    -  Primary    Toe injury, left, sequela                · I counseled the patient on the patient's conditions, their implications and medical management.    · Xrays reviewed with the patient in the exam room  · Getting better but continue to RICE.      · Fracture shoe provided.  Minimal weight bearing.   · follow up in 2-3 weeks.

## 2020-03-09 NOTE — LETTER
March 9, 2020      [facility]  [address] [facility] [address]           [place] - [facility]iat[facility]  [address] [address]  Phone: [phone]  Fax: [phone]          Patient: Hillary Fountain   MR Number: [id number]   YOB: 1985   Date of Visit: 3/9/2020       Dear [facility]:    Thank you for referring Hillary Fountain to me for evaluation. Attached you will find relevant portions of my assessment and plan of care.    If you have questions, please do not hesitate to call me. I look forward to following Hillary Fountain along with you.    Sincerely,    Amy Coreas DPM    Enclosure  CC:  No Recipients    If you would like to receive this communication electronically, please contact [email or web address] or [phone] to request more information on [facility] Link access.    For providers and/or their staff who would like to refer a patient to [facility], please contact us through our one-stop-shop provider referral line, [facility] , at [phone].    If you feel you have received this communication in error or would no longer like to receive these types of communications, please e-mail [email or web address]

## 2020-03-26 ENCOUNTER — OFFICE VISIT (OUTPATIENT)
Dept: PODIATRY | Facility: CLINIC | Age: 35
End: 2020-03-26
Payer: COMMERCIAL

## 2020-03-26 DIAGNOSIS — S99.922S TOE INJURY, LEFT, SEQUELA: ICD-10-CM

## 2020-03-26 DIAGNOSIS — M79.675 PAIN OF TOE OF LEFT FOOT: Primary | ICD-10-CM

## 2020-03-26 PROCEDURE — 99213 PR OFFICE/OUTPT VISIT, EST, LEVL III, 20-29 MIN: ICD-10-PCS | Mod: 95,,, | Performed by: PODIATRIST

## 2020-03-26 PROCEDURE — 99213 OFFICE O/P EST LOW 20 MIN: CPT | Mod: 95,,, | Performed by: PODIATRIST

## 2020-03-26 NOTE — PROGRESS NOTES
The patient location is: Home  The chief complaint leading to consultation is:  follow up toe pain  Visit type: Virtual visit with synchronous audio and video    Total time spent with patient: 10 mins (2:30pm - 2:40pm)    Each patient to whom he or she provides medical services by telemedicine is:  (1) informed of the relationship between the physician and patient and the respective role of any other health care provider with respect to management of the patient; and (2) notified that he or she may decline to receive medical services by telemedicine and may withdraw from such care at any time.    Notes:     CC:  follow up toe pain/injury.     HPI: Patient was last seen in clinic on 3/9/2020.    She has a toe injury on 3/6/2020.   She reports wearing fracture shoe without issues.  Pain is better but she still feels a knot in the area of the 3rd proximal interphalangeal joint of the left foot as indicated on the video.   Swelling overall appears improved.   There appears to be no pain with 3rd metatarso-phalangeal joint range of motion, but limited at the proximal interphalangeal joint.    No other constitutional symptoms reported.       2/11/2020  Imaging:    Impression       Small avulsion fracture fragment at the dorsal aspect of the base of the 3rd middle phalanx         DX/ASSESSMENT:    Encounter Diagnoses   Name Primary?    Pain of toe of left foot Yes    Toe injury, left, sequela           PLAN:      · I counseled the patient on the patient's conditions, their implications and medical management.    · Xrays reviewed again with the patient.  Discussed the avulsion fracture at the proximal interphalangeal joint.   · Continue RICE, fracture shoe for another three weeks, especially avoid activities that causes plantarflexion or dorsiflexion of the left 3rd toe.    · [facility] message with progress in about three weeks.  Patient is amenable to plan.

## 2020-06-04 ENCOUNTER — TELEPHONE (OUTPATIENT)
Dept: OBSTETRICS AND GYNECOLOGY | Facility: CLINIC | Age: 35
End: 2020-06-04

## 2020-06-04 RX ORDER — NITROFURANTOIN 25; 75 MG/1; MG/1
100 CAPSULE ORAL 2 TIMES DAILY
Qty: 14 CAPSULE | Refills: 0 | Status: SHIPPED | OUTPATIENT
Start: 2020-06-04 | End: 2020-06-11

## 2020-06-04 NOTE — TELEPHONE ENCOUNTER
Pt thinks she has a UTI.  C/o dysuria, frequency and right flank pain.  Afebrile.      Macrobid pended, recommended an appt, go to UC or the ER if no improvement or feeling worse in 24-48 hours.

## 2020-06-22 ENCOUNTER — OFFICE VISIT (OUTPATIENT)
Dept: OBSTETRICS AND GYNECOLOGY | Facility: CLINIC | Age: 35
End: 2020-06-22
Attending: OBSTETRICS & GYNECOLOGY
Payer: COMMERCIAL

## 2020-06-22 VITALS
BODY MASS INDEX: 27.02 KG/M2 | WEIGHT: 146.81 LBS | DIASTOLIC BLOOD PRESSURE: 70 MMHG | HEIGHT: 62 IN | SYSTOLIC BLOOD PRESSURE: 110 MMHG

## 2020-06-22 DIAGNOSIS — Z12.4 SCREENING FOR MALIGNANT NEOPLASM OF CERVIX: ICD-10-CM

## 2020-06-22 DIAGNOSIS — Z11.51 SCREENING FOR HUMAN PAPILLOMAVIRUS: ICD-10-CM

## 2020-06-22 DIAGNOSIS — Z01.419 ENCOUNTER FOR GYNECOLOGICAL EXAMINATION: Primary | ICD-10-CM

## 2020-06-22 DIAGNOSIS — R39.15 URINARY URGENCY: ICD-10-CM

## 2020-06-22 PROCEDURE — 99395 PR PREVENTIVE VISIT,EST,18-39: ICD-10-PCS | Mod: S$GLB,,, | Performed by: OBSTETRICS & GYNECOLOGY

## 2020-06-22 PROCEDURE — 99999 PR PBB SHADOW E&M-EST. PATIENT-LVL III: CPT | Mod: PBBFAC,,, | Performed by: OBSTETRICS & GYNECOLOGY

## 2020-06-22 PROCEDURE — 87624 HPV HI-RISK TYP POOLED RSLT: CPT

## 2020-06-22 PROCEDURE — 99395 PREV VISIT EST AGE 18-39: CPT | Mod: S$GLB,,, | Performed by: OBSTETRICS & GYNECOLOGY

## 2020-06-22 PROCEDURE — 88175 CYTOPATH C/V AUTO FLUID REDO: CPT

## 2020-06-22 PROCEDURE — 99999 PR PBB SHADOW E&M-EST. PATIENT-LVL III: ICD-10-PCS | Mod: PBBFAC,,, | Performed by: OBSTETRICS & GYNECOLOGY

## 2020-06-22 NOTE — PROGRESS NOTES
Subjective:       Patient ID: Hillary Fountain is a 34 y.o. female.    Chief Complaint:  Well Woman (19 -pap-negative )      History of Present Illness.  Hillary Fountain is a 34 y.o. female.  She has no breast issues. She has no menorrhagia, oligomenorrhea, bleeding betweeen menses, postcoital bleeding, dysmenorrhea, pelvic pain, dyspareunia, vaginal dryness, vaginal discharge, or sexual complaints. She has had urinary urgency x 1 month.  She has lost it once.  She drinks 5-6 bottles per day of water.  She goes every 30 minutes.  She leaks urine occasionally with sneezing.   and sexually active.  GYN & OB History  Patient's last menstrual period was 2020.   Last Pap: 2017 Normal  Gardasil:  No    OB History    Para Term  AB Living   5 4 4 0 1 4   SAB TAB Ectopic Multiple Live Births   0 0 0 0 4      # Outcome Date GA Lbr Tex/2nd Weight Sex Delivery Anes PTL Lv   5 Term 18 39w0d  3.45 kg (7 lb 9.7 oz) F CS-LTranv Spinal N FABIAN   4 Term 01/09/15 39w3d  3.502 kg (7 lb 11.5 oz) M CS-LTranv Spinal N FABIAN   3 Term 03/05/10 38w0d  2.977 kg (6 lb 9 oz) M CS-LTranv Spinal  FABIAN   2 AB 2005 8w0d          1 Term 04 37w0d  3.544 kg (7 lb 13 oz) M CS-LTranv EPI  FABIAN      Complications: Fetal Intolerance      Obstetric Comments   Age at menarche 14       Past Medical History:   Diagnosis Date    History of urinary calculi     Kidney infection     kidney stones aand kidney infection 2012/and 13    Kidney stones      Past Surgical History:   Procedure Laterality Date    BREAST AUGMENTATION  2011     SECTION  2004    Boy     SECTION  2010    Boy     SECTION  2015    Boy     SECTION  2018    Girl    CHOLECYSTECTOMY  2012    CYSTOSCOPY W/ RETROGRADES Bilateral 2019    Procedure: CYSTOSCOPY, WITH RETROGRADE PYELOGRAM;  Surgeon: Vipul Sharma MD;  Location: [facility] OR [address] [place] [address];  Service: Urology;  Laterality:  Bilateral;    DILATION AND CURETTAGE OF UTERUS  2005    TAB    URETEROSCOPY Right 6/7/2019    Procedure: URETEROSCOPY;  Surgeon: Vipul Sharma MD;  Location: [facility] OR [address] [place] [address];  Service: Urology;  Laterality: Right;  1hr     Family History   Problem Relation Age of Onset    Diabetes Maternal Grandfather     Diabetes Father     Hypertension Father     Anemia Mother     Diabetes Paternal Grandfather     Dementia Paternal Grandmother     No Known Problems Maternal Grandmother     No Known Problems Sister     No Known Problems Sister     Breast cancer Neg Hx     Colon cancer Neg Hx     Ovarian cancer Neg Hx     Stroke Neg Hx     Cancer Neg Hx     Amblyopia Neg Hx     Blindness Neg Hx     Cataracts Neg Hx     Glaucoma Neg Hx     Macular degeneration Neg Hx     Retinal detachment Neg Hx     Strabismus Neg Hx      Social History     Tobacco Use    Smoking status: Never Smoker    Smokeless tobacco: Never Used   Substance Use Topics    Alcohol use: No    Drug use: No       Current Outpatient Medications:     ibuprofen (ADVIL,MOTRIN) 200 MG tablet, Take 200 mg by mouth every 6 (six) hours as needed for Pain., Disp: , Rfl:     meclizine (ANTIVERT) 12.5 mg tablet, Take 1 tablet (12.5 mg total) by mouth 3 (three) times daily as needed for Dizziness or Nausea., Disp: 30 tablet, Rfl: 0    naproxen (NAPROSYN) 250 MG tablet, Take 250 mg by mouth 2 (two) times daily., Disp: , Rfl:     polycarbophil (FIBERCON) 625 mg tablet, Take 625 mg by mouth once daily., Disp: , Rfl:     traZODone (DESYREL) 50 MG tablet, Take 1 tablet (50 mg total) by mouth every evening., Disp: 30 tablet, Rfl: 0    Review of patient's allergies indicates:   Allergen Reactions    Lactose        Review of Systems  Review of Systems   Constitutional: Negative for fatigue.   HENT: Negative for trouble swallowing.    Eyes: Negative for visual disturbance.   Respiratory: Negative for cough and shortness of breath.    Cardiovascular:  "Negative for chest pain.   Gastrointestinal: Negative for abdominal distention, abdominal pain, blood in stool, nausea and vomiting.   Genitourinary: Negative for difficulty urinating, dyspareunia, dysuria, flank pain, frequency, hematuria, pelvic pain, urgency, vaginal bleeding, vaginal discharge and vaginal pain.   Musculoskeletal: Negative for arthralgias.   Skin: Negative for rash.   Neurological: Negative for dizziness and headaches.   Psychiatric/Behavioral: Negative for sleep disturbance. The patient is not nervous/anxious.         Objective:     Vitals:    06/22/20 1606   BP: 110/70   Weight: 66.6 kg (146 lb 13.2 oz)   Height: 5' 2" (1.575 m)   PainSc: 0-No pain     Body mass index is 26.85 kg/m².      Physical Exam:   Constitutional: She is oriented to person, place, and time. She appears well-developed and well-nourished.    HENT:   Head: Normocephalic.     Neck: Normal range of motion. No thyromegaly present.     Pulmonary/Chest: Right breast exhibits no mass, no nipple discharge, no skin change, no tenderness and no swelling. Left breast exhibits no mass, no nipple discharge, no skin change, no tenderness and no swelling. Breasts are symmetrical.        Abdominal: Soft. Normal appearance and bowel sounds are normal. She exhibits no distension. There is no abdominal tenderness.     Genitourinary:    Vagina and uterus normal.      Pelvic exam was performed with patient supine.   There is no rash, tenderness, lesion or injury on the right labia. There is no rash, tenderness, lesion or injury on the left labia. Cervix is normal. Right adnexum displays no mass, no tenderness and no fullness. Left adnexum displays no mass, no tenderness and no fullness. No erythema in the vagina. Cervix exhibits no motion tenderness and no discharge.           Musculoskeletal: Normal range of motion.      Lymphadenopathy:        Right: No supraclavicular adenopathy present.        Left: No supraclavicular adenopathy present.  "   Neurological: She is alert and oriented to person, place, and time.    Skin: Skin is warm and dry.    Psychiatric: She has a normal mood and affect.     Urine dip:  negative     Assessment/ Plan:     Encounter for gynecological examination    Urinary urgency        Limit water to 4 bottles daily  Bladder diary.  If no improvement, will try oxybutinin  Gardasil discussed  Routine pap smears.  Self breast exam  Follow-up with me in 3 months if no improvement..

## 2020-06-29 LAB
FINAL PATHOLOGIC DIAGNOSIS: NORMAL
Lab: NORMAL

## 2020-07-06 LAB
HPV HR 12 DNA SPEC QL NAA+PROBE: NEGATIVE
HPV16 AG SPEC QL: NEGATIVE
HPV18 DNA SPEC QL NAA+PROBE: NEGATIVE

## 2020-08-19 NOTE — TELEPHONE ENCOUNTER
Patient contacted. Patient was notified that the ultrasound tech was able to fit her in for 1pm and to see Dr. Comer after. Patient states that she has a meeting at work for noon and will be unable to get here on time. Patient inquired if there was a later time for her to be seen for ultrasound and visit. Patient was notified that she is being fit in on ultrasound and Dr. Comer schedules and any time changes would have to be approved by both. Patient was notified we will consult with the ultrasound technician and Dr. Comer and get back with her. Patient verbalized understanding. Please advise.    Normal Abnormal N/A Comments   Sharp-Brianne Spurling                Mobility Testing  Comments   Cervical Downglides  Normal   Cervical Flexion mobility     Thoracic PA mobility  Hypomobile    Rib mobility  Hypomobile            Red Flags           Positive    Positive    Headaches (worst patient has ever had) Pos Altered consciousness Neg   Dizziness Neg Radiating Pain Pos   Diplopia Neg Gait disturbances Neg   Dysphagia Neg Multi-segmental weakness Neg   Dysarthria Neg Tru/quad paresis Neg   Drop Attacks Neg Fracture Neg   Facial symptoms  Neg Numbness Neg   Nystagmus Neg Nausea  Neg      Joint mobility:               []?Normal                      [x]? Hypo              []?Hyper     Palpation:      Functional Mobility/Transfers: independent      Posture: guarded upper traps       RESTRICTIONS/PRECAUTIONS: none    Exercises/Interventions:       Therapeutic Ex (80313) Sets/sec Reps Notes/CUES   Upper trap stretch 30 sec 3 each side Added 6/11   Lev scap stretch      SCM stretch 30 sec 3  Added 6/17   Thoracic ext 10 sec 10 Added 6/11         Repeated cervical flexion 3 10 Added 7/15   Serratus punches 3#, ^7/22                     Manual Intervention (85905)      Cerv mobs/manip with suboccipital release 8'   Grade III-IV, 8/5   Thoracic mobs/manip 1'   Grade III-V, 8/5   CT manip Rib mobilizations 5'   Grade III-IV, 8/5   IASTM            NMR re-education (83464)   CUES NEEDED         Cervical isometrics (FLex, ext, SB)       Shoulder shrug 10sec 10 Added 6/17    Resisted scapular retraction 3 10 Blue TB, Added 7/8   Resisted shoulder ext  3 10 Blue TB, Added 7/22   Shoulder ER 3 10 Blue  TB, ^ 8/18; form corrected         Therapeutic Activity (56214)      Wall posture      Standing scaption  3 10  1#; in mirror for visual cue to avoid any shoulder hiking.     Chin tuck 10 sec 10 Added 6/11   Scapular retraction 10 sec 10 Added 6/11         Prone T 3 10 1#, ^7/29         Prone chest lift 3 10 Added 7/29 Therapeutic Exercise and NMR EXR  [x] (16353) Provided verbal/tactile cueing for activities related to strengthening, flexibility, endurance, ROM  for improvements in cervical, postural, scapular, scapulothoracic and UE control with self care, reaching, carrying, lifting, house/yardwork, driving/computer work. [x] (61555) Provided verbal/tactile cueing for activities related to improving balance, coordination, kinesthetic sense, posture, motor skill, proprioception  to assist with cervical, scapular, scapulothoracic and UE control with self care, reaching, carrying, lifting, house/yardwork, driving/computer work. Therapeutic Activities:    [] (99203 or 31133) Provided verbal/tactile cueing for activities related to improving balance, coordination, kinesthetic sense, posture, motor skill, proprioception and motor activation to allow for proper function of cervical, scapular, scapulothoracic and UE control with self care, carrying, lifting, driving/computer work.      Home Exercise Program:    [x] (85970) Reviewed/Progressed HEP activities related to strengthening, flexibility, endurance, ROM of cervical, scapular, scapulothoracic and UE control with self care, reaching, carrying, lifting, house/yardwork, driving/computer work  [] (23447) Reviewed/Progressed HEP activities related to improving balance, coordination, kinesthetic sense, posture, motor skill, proprioception of cervical, scapular, scapulothoracic and UE control with self care, reaching, carrying, lifting, house/yardwork, driving/computer work      Manual Treatments:  PROM / STM / Oscillations-Mobs:  G-I, II, III, IV (PA's, Inf., Post.)  [] (37245) Provided manual therapy to mobilize soft tissue/joints of cervical/CT, scapular GHJ and UE for the purpose of decreasing headache, modulating pain, promoting relaxation,  increasing ROM, reducing/eliminating soft tissue swelling/inflammation/restriction, improving soft tissue extensibility and allowing for proper ROM for normal function with self care, reaching, carrying, lifting, house/yardwork, driving/computer work    I      Modalities: CP -15' 8/18   [] GAME READY (VASO)- for significant edema, swelling, pain control. Charges:  Timed Code Treatment Minutes: 39'    Total Treatment Minutes: 39'       [] EVAL (LOW) 43809 (typically 20 minutes face-to-face)  [] EVAL (MOD) 58325 (typically 30 minutes face-to-face)  [] EVAL (HIGH) 10345 (typically 45 minutes face-to-face)  [] RE-EVAL     [x] SN(61451) x 1  [] IONTO  [] NMR (54084)x  [] VASO  [x] Manual (59003) x 1     [] Other:  [x] TA x 1     [] Mech Traction (67530)  [] ES(attended) (98520)      [] ES (un) (23504):        ASSESSMENT:      Thuy Maher  Patient stated goal: pain relief and gain strength    [x] Progressing: [] Met: [] Not Met: [] Adjusted    Therapist goals for Patient:   Short Term Goals: To be achieved in: 2 weeks  1. Independent in HEP and progression per patient tolerance, in order to prevent re-injury. [] Progressing: [x] Met: [] Not Met: [] Adjusted   2. Patient will have a decrease in pain to facilitate improvement in movement, function, and ADLs as indicated by Functional Deficits. [] Progressing: [x] Met: [] Not Met: [] Adjusted    Long Term Goals: To be achieved in: 4-6 weeks  1. Disability index score of 28% or less for the NDI to assist with reaching prior level of function. [x] Progressing: [] Met: [] Not Met: [] Adjusted  2. Patient will demonstrate increased cervical flexion and ext  AROM to greater than or equal to 45 deg,  to allow for proper joint functioning as indicated by patients Functional Deficits. [] Progressing: [x] Met - partially: [] Not Met: [] Adjusted  3. Patient will return to ADLs and work without increased symptoms or restriction. [x] Progressing: [] Met: [] Not Met: [] Adjusted  4. Patient will report returning to independent workouts pain free.    [] Progressing: [] Met: [x] Not Met: [] Adjusted     Overall Progression Towards Functional goals/ Treatment Progress Update:  [x] Patient is progressing as expected towards functional goals listed. [] Progression is slowed due to complexities/Impairments listed. [] Progression has been slowed due to co-morbidities. [] Plan just implemented, too soon to assess goals progression <30days   [] Goals require adjustment due to lack of progress  [] Patient is not progressing as expected and requires additional follow up with physician  [] Other    Prognosis for POC: [x] Good [] Fair  [] Poor      Patient requires continued skilled intervention: [x] Yes  [] No    Treatment/Activity Tolerance:  [x] Patient tolerated treatment well [] Patient limited by fatique  [] Patient limited by pain  [x] Patient limited by other medical complications  [] Other:  Pt able to add standing scaption with cues to keep shoulder blades depressed and notes this feels good. We discussed self trigger point release to suboccipitals using golf balls in a sock/sleeve for increased pressure to this area. She sees Dr. Dee LORA tomorrow. Continue POC as advised. 8/18    Patient education: Patient education on PT and plan of care including diagnosis, prognosis, treatment goals and options. Patient agrees with discussed POC and treatment and is aware of rehab process. 6/11    PLAN: 1x/ week for 4 weeks (8/12 - 9/8)   [x] Continue per plan of care [] Alter current plan (see comments above)  [] Plan of care initiated [] Hold pending MD visit [] Discharge    Electronically signed by: Linda Mina, PT, DPT       Note: If patient does not return for scheduled/ recommended follow up visits, this note will serve as a discharge from care along with most recent update on progress.

## 2020-10-03 ENCOUNTER — PATIENT MESSAGE (OUTPATIENT)
Dept: OBSTETRICS AND GYNECOLOGY | Facility: CLINIC | Age: 35
End: 2020-10-03

## 2020-10-05 ENCOUNTER — PATIENT MESSAGE (OUTPATIENT)
Dept: OBSTETRICS AND GYNECOLOGY | Facility: CLINIC | Age: 35
End: 2020-10-05

## 2020-10-06 DIAGNOSIS — Z30.41 ENCOUNTER FOR SURVEILLANCE OF CONTRACEPTIVE PILLS: Primary | ICD-10-CM

## 2020-10-06 RX ORDER — DROSPIRENONE AND ETHINYL ESTRADIOL 0.02-3(28)
1 KIT ORAL DAILY
Qty: 28 TABLET | Refills: 12 | Status: SHIPPED | OUTPATIENT
Start: 2020-10-06 | End: 2020-12-17

## 2020-11-12 RX ORDER — NORELGESTROMIN AND ETHINYL ESTRADIOL 35; 150 UG/MG; UG/MG
1 PATCH TRANSDERMAL
Qty: 9 PATCH | Refills: 2 | Status: SHIPPED | OUTPATIENT
Start: 2020-11-12 | End: 2021-04-08 | Stop reason: SDUPTHER

## 2020-11-27 ENCOUNTER — TELEPHONE (OUTPATIENT)
Dept: SLEEP MEDICINE | Facility: CLINIC | Age: 35
End: 2020-11-27

## 2020-11-27 NOTE — TELEPHONE ENCOUNTER
Visit Type: NEW PATIENT - VIRTUAL (DT) (899)      12/9/2020    8:40 AM  40 mins.  Jackie Marion NP     [facility]      Patient Comments:   Wake up nightly after 12am on the hour-fully awake by 4am.

## 2020-12-04 ENCOUNTER — OFFICE VISIT (OUTPATIENT)
Dept: PSYCHIATRY | Facility: CLINIC | Age: 35
End: 2020-12-04
Payer: COMMERCIAL

## 2020-12-04 DIAGNOSIS — F60.3 BORDERLINE PERSONALITY DISORDER IN ADULT: ICD-10-CM

## 2020-12-04 DIAGNOSIS — Z63.5 DIVORCE: ICD-10-CM

## 2020-12-04 PROCEDURE — 90791 PR PSYCHIATRIC DIAGNOSTIC EVALUATION: ICD-10-PCS | Mod: 95,,, | Performed by: SOCIAL WORKER

## 2020-12-04 PROCEDURE — 90785 PR INTERACTIVE COMPLEXITY: ICD-10-PCS | Mod: 95,,, | Performed by: SOCIAL WORKER

## 2020-12-04 PROCEDURE — 90785 PSYTX COMPLEX INTERACTIVE: CPT | Mod: 95,,, | Performed by: SOCIAL WORKER

## 2020-12-04 PROCEDURE — 90791 PSYCH DIAGNOSTIC EVALUATION: CPT | Mod: 95,,, | Performed by: SOCIAL WORKER

## 2020-12-04 SDOH — SOCIAL DETERMINANTS OF HEALTH (SDOH): DISRUPTION OF FAMILY BY SEPARATION AND DIVORCE: Z63.5
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
## 2020-12-07 PROBLEM — F60.3 BORDERLINE PERSONALITY DISORDER IN ADULT: Status: ACTIVE | Noted: 2020-12-07

## 2020-12-07 PROBLEM — Z63.5 DIVORCE: Status: ACTIVE | Noted: 2020-12-07

## 2020-12-08 ENCOUNTER — OFFICE VISIT (OUTPATIENT)
Dept: PSYCHIATRY | Facility: CLINIC | Age: 35
End: 2020-12-08
Payer: COMMERCIAL

## 2020-12-08 DIAGNOSIS — F41.1 GENERALIZED ANXIETY DISORDER: Primary | ICD-10-CM

## 2020-12-08 PROCEDURE — 90834 PR PSYCHOTHERAPY W/PATIENT, 45 MIN: ICD-10-PCS | Mod: 95,,, | Performed by: SOCIAL WORKER

## 2020-12-08 PROCEDURE — 90834 PSYTX W PT 45 MINUTES: CPT | Mod: 95,,, | Performed by: SOCIAL WORKER

## 2020-12-08 NOTE — PROGRESS NOTES
"The patient location is: [place], [place].   The chief complaint leading to consultation is: Divorce/ Legal Issues/ Anxiety    Visit type: audiovisual    Face to Face time with patient: 45  60 minutes of total time spent on the encounter, which includes face to face time and non-face to face time preparing to see the patient (eg, review of tests), Obtaining and/or reviewing separately obtained history, Documenting clinical information in the electronic or other health record, Independently interpreting results (not separately reported) and communicating results to the patient/family/caregiver, or Care coordination (not separately reported).   Each patient to whom he or she provides medical services by telemedicine is:  (1) informed of the relationship between the physician and patient and the respective role of any other health care provider with respect to management of the patient; and (2) notified that he or she may decline to receive medical services by telemedicine and may withdraw from such care at any time.    Individual Psychotherapy (PhD/LCSW)    12/8/2020    Site:  [facility]         Therapeutic Intervention: Met with patient.  Outpatient - Supportive psychotherapy 45 min - CPT Code 70224    Chief complaint/reason for encounter: depression and anxiety     Interval history and content of current session: Patient appears to have a pleasant mood and an appropriate affect. Appears to have some improvement since our last meeting. She reports that she has begun to put boundaries in place as it relates to her . She reports that she hasn't felt compelled to answer every text or phone call. Patient was able to take pride in herself for this. She reports that she has consulted with an  to help her navigate through the divorce, as she was not previously represented and her  was.   Reports receiving a psychic reading on this past Sunday which she described as "uplifting."   Reports, "I feel " "like I'm in a better place, I'm starting to learn how to be alone." Reports working on personal insecurities about being alone  and is becoming more focussed on self care. Explored issues with insecurities and how they stem from her past experiences; offered support and encouragement.  Patient reports that she has been listening to podcast on divorce, loving one's self and closure from relationships.   Encouraged patient to set healthy boundaries in her platonic relationship to avoid conflict.  Patient reports that she plans to begin her Master Degree in [place] [employer] in January 2021 at [employer].     Treatment plan:  · Target symptoms: depression, anxiety   · Why chosen therapy is appropriate versus another modality: patient responds to this modality  · Outcome monitoring methods: self-report, observation  · Therapeutic intervention type: insight oriented psychotherapy    Risk parameters:  Patient reports no suicidal ideation  Patient reports no homicidal ideation  Patient reports no self-injurious behavior  Patient reports no violent behavior    Verbal deficits: None    Patient's response to intervention:  The patient's response to intervention is accepting.    Progress toward goals and other mental status changes:  The patient's progress toward goals is fair .    Diagnosis:   Generalized Anxiety Disorder    Plan:  individual psychotherapy    Return to clinic: 1 week    Length of Service (minutes): 45            "

## 2020-12-09 ENCOUNTER — OFFICE VISIT (OUTPATIENT)
Dept: SURGERY | Facility: CLINIC | Age: 35
End: 2020-12-09
Payer: COMMERCIAL

## 2020-12-09 VITALS
BODY MASS INDEX: 23.93 KG/M2 | DIASTOLIC BLOOD PRESSURE: 73 MMHG | SYSTOLIC BLOOD PRESSURE: 127 MMHG | HEART RATE: 64 BPM | WEIGHT: 130.06 LBS | HEIGHT: 62 IN

## 2020-12-09 DIAGNOSIS — K64.4 EXTERNAL HEMORRHOIDS: ICD-10-CM

## 2020-12-09 DIAGNOSIS — K64.8 INTERNAL HEMORRHOIDS: Primary | ICD-10-CM

## 2020-12-09 PROCEDURE — 46600 DIAGNOSTIC ANOSCOPY SPX: CPT | Mod: S$GLB,,, | Performed by: NURSE PRACTITIONER

## 2020-12-09 PROCEDURE — 1126F PR PAIN SEVERITY QUANTIFIED, NO PAIN PRESENT: ICD-10-PCS | Mod: S$GLB,,, | Performed by: NURSE PRACTITIONER

## 2020-12-09 PROCEDURE — 3008F BODY MASS INDEX DOCD: CPT | Mod: CPTII,S$GLB,, | Performed by: NURSE PRACTITIONER

## 2020-12-09 PROCEDURE — 99999 PR PBB SHADOW E&M-EST. PATIENT-LVL IV: CPT | Mod: PBBFAC,,, | Performed by: NURSE PRACTITIONER

## 2020-12-09 PROCEDURE — 3008F PR BODY MASS INDEX (BMI) DOCUMENTED: ICD-10-PCS | Mod: CPTII,S$GLB,, | Performed by: NURSE PRACTITIONER

## 2020-12-09 PROCEDURE — 99999 PR PBB SHADOW E&M-EST. PATIENT-LVL IV: ICD-10-PCS | Mod: PBBFAC,,, | Performed by: NURSE PRACTITIONER

## 2020-12-09 PROCEDURE — 1126F AMNT PAIN NOTED NONE PRSNT: CPT | Mod: S$GLB,,, | Performed by: NURSE PRACTITIONER

## 2020-12-09 PROCEDURE — 99203 PR OFFICE/OUTPT VISIT, NEW, LEVL III, 30-44 MIN: ICD-10-PCS | Mod: 25,S$GLB,, | Performed by: NURSE PRACTITIONER

## 2020-12-09 PROCEDURE — 46600 PR DIAG2STIC A2SCOPY: ICD-10-PCS | Mod: S$GLB,,, | Performed by: NURSE PRACTITIONER

## 2020-12-09 PROCEDURE — 99203 OFFICE O/P NEW LOW 30 MIN: CPT | Mod: 25,S$GLB,, | Performed by: NURSE PRACTITIONER

## 2020-12-09 RX ORDER — HYDROCORTISONE ACETATE 25 MG/1
25 SUPPOSITORY RECTAL 2 TIMES DAILY
Qty: 28 SUPPOSITORY | Refills: 0 | Status: SHIPPED | OUTPATIENT
Start: 2020-12-09 | End: 2020-12-17

## 2020-12-09 RX ORDER — HYDROCORTISONE 25 MG/G
CREAM TOPICAL 2 TIMES DAILY
Qty: 28 G | Refills: 2 | Status: SHIPPED | OUTPATIENT
Start: 2020-12-09 | End: 2020-12-17

## 2020-12-09 NOTE — PROGRESS NOTES
CRS Office Visit History and Physical    Referring Md:   No referring provider defined for this encounter.    SUBJECTIVE:     Chief Complaint: hemorrhoids    History of Present Illness:  The patient is new patient to this practice.   Course is as follows:  Patient is a 35 y.o. female presents with external hemorrhoidal tissue that she would like removed. She first noted this after her first child 16 years ago and feels it has progressively worsened with each of her additional 3 pregnancies. They swell intermittently.   Symptoms have been present for 16 years.  Has tried nothing.  Associated bleeding: no  Previous anorectal procedures: no  confirms straining/prolonged time on toilet with bowel movements.  is not currently taking fiber supplement or stool softener. Currently having a bowel movement q 3 days. Of loose to hard stools. Depending on what she ate as she has had a fiona.   Blood thinners: No    Of note she states she is going through a divorce currently, is very stressed, and has lost a significant amount of weight d/t this.     Last Colonoscopy: none  Family history of colorectal cancer or IBD: none.    Review of patient's allergies indicates:   Allergen Reactions    Lactose        Past Medical History:   Diagnosis Date    History of urinary calculi     Kidney infection     kidney stones aand kidney infection 2012/and 13    Kidney stones      Past Surgical History:   Procedure Laterality Date    BREAST AUGMENTATION  2011     SECTION  2004    Boy     SECTION  2010    Boy     SECTION  2015    Boy     SECTION  2018    Girl    CHOLECYSTECTOMY      CYSTOSCOPY W/ RETROGRADES Bilateral 2019    Procedure: CYSTOSCOPY, WITH RETROGRADE PYELOGRAM;  Surgeon: Vipul Sharma MD;  Location: [facility] OR [address] [place] [address];  Service: Urology;  Laterality: Bilateral;    DILATION AND CURETTAGE OF UTERUS      TAB    URETEROSCOPY Right 2019    Procedure:  "URETEROSCOPY;  Surgeon: Vipul Sharma MD;  Location: [facility] OR [address] [place] [address];  Service: Urology;  Laterality: Right;  1hr     Family History   Problem Relation Age of Onset    Diabetes Maternal Grandfather     Diabetes Father     Hypertension Father     Anemia Mother     Diabetes Paternal Grandfather     Dementia Paternal Grandmother     No Known Problems Maternal Grandmother     No Known Problems Sister     No Known Problems Sister     Breast cancer Neg Hx     Colon cancer Neg Hx     Ovarian cancer Neg Hx     Stroke Neg Hx     Cancer Neg Hx     Amblyopia Neg Hx     Blindness Neg Hx     Cataracts Neg Hx     Glaucoma Neg Hx     Macular degeneration Neg Hx     Retinal detachment Neg Hx     Strabismus Neg Hx      Social History     Tobacco Use    Smoking status: Never Smoker    Smokeless tobacco: Never Used   Substance Use Topics    Alcohol use: No    Drug use: No        Review of Systems:  Review of Systems   Constitutional: Positive for weight loss.   Gastrointestinal: Positive for abdominal pain, blood in stool and constipation. Negative for diarrhea and melena.   Skin: Negative for itching and rash.   Psychiatric/Behavioral: The patient is not nervous/anxious.        OBJECTIVE:     Vital Signs (Most Recent)  Blood Pressure 127/73 (BP Location: Right arm, Patient Position: Sitting, BP Method: Large (Automatic))   Pulse 64   Height 5' 2" (1.575 m)   Weight 59 kg (130 lb 1.1 oz)   Body Mass Index 23.79 kg/m²     Physical Exam:  General: Other female in no distress   Neuro: Alert and oriented to person, place, and time.  Moves all extremities.     HEENT: No icterus.  Trachea midline  Respiratory: Respirations are even and unlabored, no cough or audible wheezing  Abdomen: soft, flat  Skin: Warm dry and intact, No visible rashes, no jaundice    Labs reviewed today:  Lab Results   Component Value Date    WBC 9.70 05/24/2019    HGB 12.5 05/24/2019    HCT 37.4 05/24/2019     05/24/2019    " ALT 20 05/24/2019    AST 22 05/24/2019     05/24/2019    K 3.7 05/24/2019     05/24/2019    CREATININE 0.7 05/24/2019    BUN 10 05/24/2019    CO2 27 05/24/2019    TSH 1.177 05/23/2014       Imaging reviewed today:  none    Endoscopy reviewed today:  none    Anorectal Exam:    Anal Skin: external hemorrhoidal tissue, nonthrombosed, nontender    Digital Rectal Exam:  Resting Tone normal  Squeeze normal  Relaxation with bear down present  Mass none  Rectocele  absent  Tenderness  absent    Anoscopy:  Verbal consent was obtained.   Clear plastic anoscope inserted.    Hemorrhoids  present  Stigmata of bleeding  absent  Stigmata of prolapsed  present  Distal rectal mucosa normal    ASSESSMENT/PLAN:     Hillary was seen today for hemorrhoids.    Diagnoses and all orders for this visit:    Internal hemorrhoids  -     hydrocortisone (ANUSOL-HC) 25 mg suppository; Place 1 suppository (25 mg total) rectally 2 (two) times daily. for 14 days    External hemorrhoids  -     hydrocortisone 2.5 % cream; Apply topically 2 (two) times daily.        The patient was instructed to:  Suppositories 2x/day for 14 days  External cream prn  Increase water intake to at least 8-10 glasses of water per day.  Take a daily fiber supplement (Konsyl, Benefiber, Metamucil) and increase dietary intake to 20-30 grams/day.  Avoid straining or spending >5min/event with bowel movements.  Follow-up in clinic in 4-6 weeks. Would like to see female physician to discuss hemorrhoidectomy. Appt scheduled.       JIN Thibodeaux  Colon and Rectal Surgery

## 2020-12-09 NOTE — PATIENT INSTRUCTIONS
If prescriptions are expensive, purchase the preparation H suppositories (or cream with internal applicator) and use 2x/day for 14 days        [facility]  High Fiber Diet    25-30 grams of fiber per day is recommended  Fiber cereal = 5 grams (Raisin Bran, Shredded Wheat, Grape Nuts)  Konsyl 1 teaspoon = 6 grams  Metamucil 1 tablespoon = 3 grams  Citrucel 1 tablespoon = 2 grams  Fiber Choice = 3-4 per day    1. Drink at least 4-5 glasses of liquids per day or the fiber can be constipating rather then stimulating to your gut.  2. Boil and bake potatoes in their skin. Eat the skin, too.  3. Include fresh fruits and raw vegetables in your daily diet. Raw fruits and vegetables have more useful fiber than those that have been peeled, cooked, pureed, or otherwise processed.  4. Eat a wide variety of fibrous foods in reasonable amounts. Increase fiber intake slowly especially if you have been on a low-fiber diet.  5. Eat more legumes-peas, beans, soybeans.  6. For snacks, try dried fruit, whole wheat and rye crackers.  7. Avoid instant-cook hot cereals. Use the longer cooking cereals.  8. Use bran whenever possible. Sprinkle it on top of cereal, mix it into mashed potatoes or hamburger meat, or use it in combination dishes such as meat loaf.   9. Substitute whole grain, whole wheat and bran products for white flour products.  10. Eat slowly and chew your food thoroughly.        Start daily fiber.  Take 1 tsp of fiber powder (psyllium or other sugar-free powder).  Mix in 8 oz of water.  Take x 3-5 days.  Then, increase fiber by 1 tsp every 3-5 days until stool is easy to pass.  Stop and continue at that dose.   Do not exceed 6 tsps/day. GOAL:  More well-formed stool (one continuous well-formed piece vs. Pellets) and minimize straining with initiation.        Foods High in Fiber    This diet furnishes adequate amounts of all the essential nutrients needed by the body and a very liberal fiber or roughage  content. Roughage is indigestible fiber found in fruits, vegetables and whole grain cereals. It provides bulk to the large intestine and, accompanied by an adequate fluid intake, is a stimulant to elimination. Regular eating and elimination habits are vital to good health.     Fruits:  Use all fruits and juices liberally; fresh, cooked, dried or canned. Eat fruit raw and with skins when possible. Have at least four servings of fruit daily including a citrus fruit and a stewed dried fruit. Hard seeds of fruits (berries, figs, Grapes, mangoes, tomatoes) etc. may be removed.    Vegetables: Use all vegetables liberally. Green leafy vegetables, such as cabbage, spinach, lettuce, broccoli, and other greens are particularly good.    Potato: As desired. Serve baked frequently and eat the skin. Other starchy foods such as rice, macaroni, etc., may be occasionally substituted. Chew popcorn well and do not eat hard kernels.    Meat, Fish, Poultry: One or two servings daily.    Eggs: One daily if you are not on a low cholesterol diet.    Milk: Include at least one-half pint daily. Whole milk or skimmed may be used.     Cereals: Use whole grain breads and cereals such as bran, bran flakes, grape nut flakes, puffed wheat, oatmeal, Wheaties, etc., as much as possible. Refined breaks and cereals are not restricted; however, they do not contain the bulk necessary for this diet.     Sugars, Sweets: Use very moderately. Depend on fruit as dessert.    Fats: Use butter or margarine as desired. Oil or dressing on salads as desired. Fried foods may be used in moderation. Nuts may be eaten if you chew them well and may be ground or finely chopped for cooking.   Sample Menu                                                                                 Breakfast                          Lunch  Orange juice, 4 ounces                                                Vegetable soup                    Stewed fruit                                                                  Fresh fruit plate with cottage cheese  Shredded wheat                                                           Whole wheat toast  Scrambled eggs                                                           Butter  Whole wheat toast                                                       Coffee or tea  Dinner                                                                         Bedtime  Large glass tomato juice                                             1 glass milk  Roast beef                                                                   stewed fruit  Baked potato with skin  Buttered spinach  Raw vegetable salad  Baked apple with skin   Coffee or tea

## 2020-12-16 ENCOUNTER — TELEPHONE (OUTPATIENT)
Dept: OBSTETRICS AND GYNECOLOGY | Facility: CLINIC | Age: 35
End: 2020-12-16

## 2020-12-16 NOTE — TELEPHONE ENCOUNTER
Pt has been using Xulane patch for 5 weeks.  She c/o vaginal dryness and irritation for less than a week.  Denies itching and discharge.  She has red spotting but it aware that is normal since she just started a different birth control.  No change in soaps.  She is sexually active, intercourse is a little uncomfortable.     Do you recommend an appt or is this from different form of birth control?

## 2020-12-17 ENCOUNTER — OFFICE VISIT (OUTPATIENT)
Dept: PSYCHIATRY | Facility: CLINIC | Age: 35
End: 2020-12-17
Payer: COMMERCIAL

## 2020-12-17 ENCOUNTER — OFFICE VISIT (OUTPATIENT)
Dept: OBSTETRICS AND GYNECOLOGY | Facility: CLINIC | Age: 35
End: 2020-12-17
Payer: COMMERCIAL

## 2020-12-17 VITALS
WEIGHT: 130.63 LBS | DIASTOLIC BLOOD PRESSURE: 65 MMHG | HEIGHT: 62 IN | SYSTOLIC BLOOD PRESSURE: 100 MMHG | BODY MASS INDEX: 24.04 KG/M2

## 2020-12-17 DIAGNOSIS — F41.8 DEPRESSION WITH ANXIETY: Primary | ICD-10-CM

## 2020-12-17 DIAGNOSIS — N76.0 BACTERIAL VAGINITIS: ICD-10-CM

## 2020-12-17 DIAGNOSIS — B96.89 BACTERIAL VAGINITIS: ICD-10-CM

## 2020-12-17 DIAGNOSIS — N89.8 VAGINAL IRRITATION: Primary | ICD-10-CM

## 2020-12-17 LAB
BILIRUB SERPL-MCNC: NORMAL MG/DL
BLOOD URINE, POC: NORMAL
CLARITY, POC UA: NORMAL
COLOR, POC UA: NORMAL
GLUCOSE UR QL STRIP: NORMAL
KETONES UR QL STRIP: NORMAL
LEUKOCYTE ESTERASE URINE, POC: NORMAL
NITRITE, POC UA: NORMAL
PH, POC UA: 6
PROTEIN, POC: NORMAL
SPECIFIC GRAVITY, POC UA: 1.01
UROBILINOGEN, POC UA: NORMAL

## 2020-12-17 PROCEDURE — 99999 PR PBB SHADOW E&M-EST. PATIENT-LVL III: ICD-10-PCS | Mod: PBBFAC,,, | Performed by: OBSTETRICS & GYNECOLOGY

## 2020-12-17 PROCEDURE — 3008F PR BODY MASS INDEX (BMI) DOCUMENTED: ICD-10-PCS | Mod: CPTII,S$GLB,, | Performed by: OBSTETRICS & GYNECOLOGY

## 2020-12-17 PROCEDURE — 90834 PSYTX W PT 45 MINUTES: CPT | Mod: 95,,, | Performed by: SOCIAL WORKER

## 2020-12-17 PROCEDURE — 99213 PR OFFICE/OUTPT VISIT, EST, LEVL III, 20-29 MIN: ICD-10-PCS | Mod: 25,S$GLB,, | Performed by: OBSTETRICS & GYNECOLOGY

## 2020-12-17 PROCEDURE — 1126F PR PAIN SEVERITY QUANTIFIED, NO PAIN PRESENT: ICD-10-PCS | Mod: S$GLB,,, | Performed by: OBSTETRICS & GYNECOLOGY

## 2020-12-17 PROCEDURE — 3008F BODY MASS INDEX DOCD: CPT | Mod: CPTII,S$GLB,, | Performed by: OBSTETRICS & GYNECOLOGY

## 2020-12-17 PROCEDURE — 81002 POCT URINE DIPSTICK WITHOUT MICROSCOPE: ICD-10-PCS | Mod: S$GLB,,, | Performed by: OBSTETRICS & GYNECOLOGY

## 2020-12-17 PROCEDURE — 1126F AMNT PAIN NOTED NONE PRSNT: CPT | Mod: S$GLB,,, | Performed by: OBSTETRICS & GYNECOLOGY

## 2020-12-17 PROCEDURE — 87210 PR  SMEAR,STAIN,WET MNT,INTERP: ICD-10-PCS | Mod: QW,S$GLB,, | Performed by: OBSTETRICS & GYNECOLOGY

## 2020-12-17 PROCEDURE — 87210 SMEAR WET MOUNT SALINE/INK: CPT | Mod: QW,S$GLB,, | Performed by: OBSTETRICS & GYNECOLOGY

## 2020-12-17 PROCEDURE — 90834 PR PSYCHOTHERAPY W/PATIENT, 45 MIN: ICD-10-PCS | Mod: 95,,, | Performed by: SOCIAL WORKER

## 2020-12-17 PROCEDURE — 81002 URINALYSIS NONAUTO W/O SCOPE: CPT | Mod: S$GLB,,, | Performed by: OBSTETRICS & GYNECOLOGY

## 2020-12-17 PROCEDURE — 99999 PR PBB SHADOW E&M-EST. PATIENT-LVL III: CPT | Mod: PBBFAC,,, | Performed by: OBSTETRICS & GYNECOLOGY

## 2020-12-17 PROCEDURE — 99213 OFFICE O/P EST LOW 20 MIN: CPT | Mod: 25,S$GLB,, | Performed by: OBSTETRICS & GYNECOLOGY

## 2020-12-17 RX ORDER — METRONIDAZOLE 500 MG/1
500 TABLET ORAL EVERY 12 HOURS
Qty: 10 TABLET | Refills: 0 | Status: SHIPPED | OUTPATIENT
Start: 2020-12-17 | End: 2020-12-22

## 2020-12-17 NOTE — PROGRESS NOTES
The patient location is: [place], [place].  The chief complaint leading to consultation is: Depression/Anxiety    Visit type: audiovisual  Face to Face time with patient: 45  60 minutes of total time spent on the encounter, which includes face to face time and non-face to face time preparing to see the patient (eg, review of tests), Obtaining and/or reviewing separately obtained history, Documenting clinical information in the electronic or other health record, Independently interpreting results (not separately reported) and communicating results to the patient/family/caregiver, or Care coordination (not separately reported).   Each patient to whom he or she provides medical services by telemedicine is:  (1) informed of the relationship between the physician and patient and the respective role of any other health care provider with respect to management of the patient; and (2) notified that he or she may decline to receive medical services by telemedicine and may withdraw from such care at any time.    Individual Psychotherapy (PhD/LCSW)    12/17/2020    Site:  [facility]         Therapeutic Intervention: Met with patient.  Outpatient - Supportive psychotherapy 45 min - CPT Code 76019    Chief complaint/reason for encounter: depression and anxiety     Interval history and content of current session: Reports divorce hearing got continued.  She continues to endorse a significant amount of stress as it relates to her dealings with her .  Reports that she and her  continue to have disagreements about issues involving the kids and the family dog. Reports that she and her  had been together since 2008. Reports that she has been very depressed since last year, endorses escalating discord with her  since last year.   Reports that professional pictures to show the house have been rescheduled by the  three times, so that the family house has not been able to be placed in the MLS.    Processed difficulty with sleep and the patient's need to be in control of things. We discussed the need to challenge distorted thought processes and allow things to proceed as they should. We dicussed alternatives to worry and anxiety.   Processed the need to maintain healthy boundaries in all relationships in an effort to avoid emotional dysregulation.     Treatment plan:  · Target symptoms: depression, distractability, anxiety , adjustment  · Why chosen therapy is appropriate versus another modality: patient responds to this modality  · Outcome monitoring methods: self-report, observation  · Therapeutic intervention type: supportive psychotherapy    Risk parameters:  Patient reports no suicidal ideation  Patient reports no homicidal ideation  Patient reports no self-injurious behavior  Patient reports no violent behavior    Verbal deficits: None    Patient's response to intervention:  The patient's response to intervention is accepting.    Progress toward goals and other mental status changes:  The patient's progress toward goals is fair .    Diagnosis:   Generalized Anxiety Disorder  Adjustment Disorder    Plan:  individual psychotherapy    Return to clinic: 1 week    Length of Service (minutes): 45

## 2020-12-17 NOTE — PROGRESS NOTES
Subjective:       Patient ID: Hillary Fountain is a 35 y.o. female.    Chief Complaint:  Gynecologic Exam (Vaginal dryness and irritation)  36 yo  here today for vaginal irritation with internal vaginal burning and dryness for the past 2 weeks. She started the Xulane patch 4 weeks ago and started bleeding on week 3 so removed and placed a new one without one week off. Denies urinary frequency or dysuria. Denies bowel problems. Denies back pain or fever.    Patient Active Problem List   Diagnosis    Previous  section    Ureteral stone    Right flank pain    Cervical pain    BPPV (benign paroxysmal positional vertigo), left    Sensitivity to light    Urinary urgency    Borderline personality disorder in adult    Divorce       History of Present Illness    Past Medical History:   Diagnosis Date    History of urinary calculi     Kidney infection     kidney stones aand kidney infection 2012/and 13    Kidney stones        Past Surgical History:   Procedure Laterality Date    BREAST AUGMENTATION       SECTION  2004    Boy     SECTION  2010    Boy     SECTION  2015    Boy     SECTION  2018    Girl    CHOLECYSTECTOMY  2012    CYSTOSCOPY W/ RETROGRADES Bilateral 2019    Procedure: CYSTOSCOPY, WITH RETROGRADE PYELOGRAM;  Surgeon: Vipul Sharma MD;  Location: [facility] OR [address] [place] [address];  Service: Urology;  Laterality: Bilateral;    DILATION AND CURETTAGE OF UTERUS      TAB    URETEROSCOPY Right 2019    Procedure: URETEROSCOPY;  Surgeon: Vipul Sharma MD;  Location: [facility] OR [address] [place] [address];  Service: Urology;  Laterality: Right;  1hr       OB History    Para Term  AB Living   5 4 4 0 1 4   SAB TAB Ectopic Multiple Live Births   0 0 0 0 4      # Outcome Date GA Lbr Tex/2nd Weight Sex Delivery Anes PTL Lv   5 Term 18 39w0d  3.45 kg (7 lb 9.7 oz) F CS-LTranv Spinal N FABIAN   4 Term 01/09/15 39w3d  3.502 kg (7 lb 11.5  "oz) M CS-LTranv Spinal N FABIAN   3 Term 03/05/10 38w0d  2.977 kg (6 lb 9 oz) M CS-LTranv Spinal  FABIAN   2 AB 2005 8w0d          1 Term 08/18/04 37w0d  3.544 kg (7 lb 13 oz) M CS-LTranv EPI  FABIAN      Complications: Fetal Intolerance      Obstetric Comments   Age at menarche 14       Patient's last menstrual period was 12/03/2020 (within days).   Date of Last Pap: 6/29/2020    Review of Systems  Review of Systems   Constitutional: Negative for chills and fever.   Gastrointestinal: Negative for abdominal pain, constipation, diarrhea, nausea and vomiting.   Genitourinary: Negative for difficulty urinating, dyspareunia, pelvic pain, urgency and vaginal discharge.   Musculoskeletal: Negative for back pain.   Skin: Negative for rash.        Objective:   /65   Ht 5' 2" (1.575 m)   Wt 59.2 kg (130 lb 10 oz)   LMP 12/03/2020 (Within Days)   BMI 23.89 kg/m²   Body mass index is 23.89 kg/m².    Physical Exam:   Constitutional: She is oriented to person, place, and time. She appears well-developed and well-nourished. No distress.             Abdominal: Soft. She exhibits no distension. There is no abdominal tenderness.     Genitourinary:    Inguinal canal and rectum normal.      Pelvic exam was performed with patient supine.   There is no rash or lesion on the right labia. There is no rash or lesion on the left labia. Uterus is not tender. Right adnexum displays no mass, no tenderness and no fullness. Left adnexum displays no mass, no tenderness and no fullness. No bleeding in the vagina. Labial bartholins normal.Cervix exhibits no motion tenderness and no discharge. negative for vaginal discharge       Uterus Size: 6 cm       Neurological: She is alert and oriented to person, place, and time.    Skin: Skin is warm and dry. No rash noted.           Results for orders placed or performed in visit on 12/17/20   POCT urine dipstick without microscope   Result Value Ref Range    Color, UA      pH, UA 6     WBC, UA Neg     " Nitrite, UA Neg     Protein Neg     Glucose, UA Normal     Ketones, UA Small     Urobilinogen, UA Normal     Bilirubin Neg     Blood, UA Trace     Clarity, UA      Spec Grav UA 1.015      Microscopic wet-mount exam shows KOH done, clue cells, vaginal pH is <4.5.  Assessment/ Plan:     1. Vaginal irritation  - POCT urine dipstick without microscope: negative    2. Bacterial vaginitis  Discussed results of wet prep and treatment options. Rx sent to pharmacy.  - metroNIDAZOLE (FLAGYL) 500 MG tablet; Take 1 tablet (500 mg total) by mouth every 12 (twelve) hours. for 5 days  Dispense: 10 tablet; Refill: 0     RTC prn any further problems    Nancy Silva MD

## 2020-12-29 NOTE — PROGRESS NOTES
CRS Office Visit History and Physical      SUBJECTIVE:     Chief Complaint: Hemorrhoids    History of Present Illness:  The patient is known patient to this practice.   Course is as follows:  Patient is a 35 y.o. female presents with hemorrhoids, unhappy with appearance, occasional swelling (states this is rare), no bleeding.  Symptoms have been present for years, since last delivery.  Previously saw NP, puja discussed surgery w her  Associated bleeding: no  Previous anorectal procedures: no  denies straining/prolonged time on toilet with bowel movements.  is not currently taking fiber supplement of stool softener, east high fiber diet which prevent constipation for her  Blood thinners: No    Last Colonoscopy: None    Review of patient's allergies indicates:   Allergen Reactions    Lactose        Past Medical History:   Diagnosis Date    History of urinary calculi     Kidney infection     kidney stones aand kidney infection 2012/and 13    Kidney stones      Past Surgical History:   Procedure Laterality Date    BREAST AUGMENTATION       SECTION  2004    Boy     SECTION  2010    Boy     SECTION  2015    Boy     SECTION  2018    Girl    CHOLECYSTECTOMY  2012    CYSTOSCOPY W/ RETROGRADES Bilateral 2019    Procedure: CYSTOSCOPY, WITH RETROGRADE PYELOGRAM;  Surgeon: Vipul Sharma MD;  Location: [facility] OR [address] [place] [address];  Service: Urology;  Laterality: Bilateral;    DILATION AND CURETTAGE OF UTERUS      TAB    URETEROSCOPY Right 2019    Procedure: URETEROSCOPY;  Surgeon: Vipul Sharma MD;  Location: [facility] OR [address] [place] [address];  Service: Urology;  Laterality: Right;  1hr     Family History   Problem Relation Age of Onset    Diabetes Maternal Grandfather     Diabetes Father     Hypertension Father     Anemia Mother     Diabetes Paternal Grandfather     Dementia Paternal Grandmother     No Known Problems Maternal Grandmother     No Known  "Problems Sister     No Known Problems Sister     Breast cancer Neg Hx     Colon cancer Neg Hx     Ovarian cancer Neg Hx     Stroke Neg Hx     Cancer Neg Hx     Amblyopia Neg Hx     Blindness Neg Hx     Cataracts Neg Hx     Glaucoma Neg Hx     Macular degeneration Neg Hx     Retinal detachment Neg Hx     Strabismus Neg Hx      Social History     Tobacco Use    Smoking status: Never Smoker    Smokeless tobacco: Never Used   Substance Use Topics    Alcohol use: No    Drug use: No        Review of Systems:  Review of Systems   Gastrointestinal: Negative for blood in stool, constipation and diarrhea.   All other systems reviewed and are negative.      OBJECTIVE:     Vital Signs (Most Recent)  BP 95/72 (BP Location: Left arm, Patient Position: Sitting, BP Method: Large (Automatic))   Pulse 71   Ht 5' 2" (1.575 m)   Wt 58.2 kg (128 lb 4.9 oz)   LMP 12/03/2020 (Within Days)   BMI 23.47 kg/m²     Physical Exam:  General: Other female in no distress   Neuro: Alert and oriented x 4.  Moves all extremities.     HEENT: No icterus.  Trachea midline  Respiratory: Respirations are even and unlabored  Cardiac: Regular rate  Extremities: Warm dry and intact  Skin: No rashes    Anorectal:   External exam:  Relatively small amount of external hemorrhoid tissue, no large skin tags  Digital exam revealed normal tone.    Anoscopy:  Verbal consent was obtained.   Clear plastic anoscope inserted.    Grade II-III hemorrhoids seen.    Rubber Band Ligation:  Suction applicator was placed above the dentate line.   Rubber bands were deployed in the RA and LL positions.    Patient tolerated the procedure well.       ASSESSMENT/PLAN:     34yo F w/ hemorrhoids    The patient was instructed to:  Increase water intake to at least 8-10 glasses of water per day.  Take a daily fiber supplement (Konsyl, Benefiber, Metamucil) and increase dietary intake to 20-30 grams/day.  Avoid straining or spending >5min/event with bowel " movements.  Follow-up in clinic in 4 weeks, likely RP banding at that time    Nancy Nelson MD  Staff Surgeon, Colon and Rectal Surgery  [facility]

## 2020-12-30 ENCOUNTER — OFFICE VISIT (OUTPATIENT)
Dept: SURGERY | Facility: CLINIC | Age: 35
End: 2020-12-30
Attending: COLON & RECTAL SURGERY
Payer: COMMERCIAL

## 2020-12-30 VITALS
HEART RATE: 71 BPM | BODY MASS INDEX: 23.61 KG/M2 | SYSTOLIC BLOOD PRESSURE: 95 MMHG | DIASTOLIC BLOOD PRESSURE: 72 MMHG | WEIGHT: 128.31 LBS | HEIGHT: 62 IN

## 2020-12-30 DIAGNOSIS — K64.8 INTERNAL HEMORRHOIDS: Primary | ICD-10-CM

## 2020-12-30 PROCEDURE — 3008F BODY MASS INDEX DOCD: CPT | Mod: CPTII,S$GLB,, | Performed by: COLON & RECTAL SURGERY

## 2020-12-30 PROCEDURE — 99213 OFFICE O/P EST LOW 20 MIN: CPT | Mod: 25,S$GLB,, | Performed by: COLON & RECTAL SURGERY

## 2020-12-30 PROCEDURE — 1126F AMNT PAIN NOTED NONE PRSNT: CPT | Mod: S$GLB,,, | Performed by: COLON & RECTAL SURGERY

## 2020-12-30 PROCEDURE — 46221 PR HEMORRHOIDECTOMY INTERNAL RUBBER BAND LIGATIONS: ICD-10-PCS | Mod: S$GLB,,, | Performed by: COLON & RECTAL SURGERY

## 2020-12-30 PROCEDURE — 1126F PR PAIN SEVERITY QUANTIFIED, NO PAIN PRESENT: ICD-10-PCS | Mod: S$GLB,,, | Performed by: COLON & RECTAL SURGERY

## 2020-12-30 PROCEDURE — 3008F PR BODY MASS INDEX (BMI) DOCUMENTED: ICD-10-PCS | Mod: CPTII,S$GLB,, | Performed by: COLON & RECTAL SURGERY

## 2020-12-30 PROCEDURE — 46221 LIGATION OF HEMORRHOID(S): CPT | Mod: S$GLB,,, | Performed by: COLON & RECTAL SURGERY

## 2020-12-30 PROCEDURE — 99999 PR PBB SHADOW E&M-EST. PATIENT-LVL III: CPT | Mod: PBBFAC,,, | Performed by: COLON & RECTAL SURGERY

## 2020-12-30 PROCEDURE — 99999 PR PBB SHADOW E&M-EST. PATIENT-LVL III: ICD-10-PCS | Mod: PBBFAC,,, | Performed by: COLON & RECTAL SURGERY

## 2020-12-30 PROCEDURE — 99213 PR OFFICE/OUTPT VISIT, EST, LEVL III, 20-29 MIN: ICD-10-PCS | Mod: 25,S$GLB,, | Performed by: COLON & RECTAL SURGERY

## 2021-03-18 ENCOUNTER — PATIENT MESSAGE (OUTPATIENT)
Dept: OBSTETRICS AND GYNECOLOGY | Facility: CLINIC | Age: 36
End: 2021-03-18

## 2021-03-18 DIAGNOSIS — Z97.5 IUD CONTRACEPTION: Primary | ICD-10-CM

## 2021-03-30 ENCOUNTER — TELEPHONE (OUTPATIENT)
Dept: OBSTETRICS AND GYNECOLOGY | Facility: CLINIC | Age: 36
End: 2021-03-30

## 2021-03-31 ENCOUNTER — TELEPHONE (OUTPATIENT)
Dept: OBSTETRICS AND GYNECOLOGY | Facility: CLINIC | Age: 36
End: 2021-03-31
Payer: COMMERCIAL

## 2021-04-08 ENCOUNTER — TELEPHONE (OUTPATIENT)
Dept: OBSTETRICS AND GYNECOLOGY | Facility: CLINIC | Age: 36
End: 2021-04-08

## 2021-04-08 RX ORDER — NORELGESTROMIN AND ETHINYL ESTRADIOL 35; 150 UG/MG; UG/MG
1 PATCH TRANSDERMAL
Qty: 9 PATCH | Refills: 2 | Status: SHIPPED | OUTPATIENT
Start: 2021-04-08 | End: 2021-04-09

## 2021-04-09 ENCOUNTER — TELEPHONE (OUTPATIENT)
Dept: OBSTETRICS AND GYNECOLOGY | Facility: CLINIC | Age: 36
End: 2021-04-09

## 2021-04-09 DIAGNOSIS — Z30.45 ENCOUNTER FOR SURVEILLANCE OF TRANSDERMAL PATCH HORMONAL CONTRACEPTIVE DEVICE: Primary | ICD-10-CM

## 2021-04-09 RX ORDER — NORELGESTROMIN AND ETHINYL ESTRADIOL 35; 150 UG/MG; UG/MG
1 PATCH TRANSDERMAL WEEKLY
Qty: 3 PATCH | Refills: 11 | Status: SHIPPED | OUTPATIENT
Start: 2021-04-09 | End: 2021-08-20

## 2021-04-19 ENCOUNTER — PATIENT MESSAGE (OUTPATIENT)
Dept: PSYCHIATRY | Facility: CLINIC | Age: 36
End: 2021-04-19

## 2021-04-28 ENCOUNTER — OFFICE VISIT (OUTPATIENT)
Dept: OBSTETRICS AND GYNECOLOGY | Facility: CLINIC | Age: 36
End: 2021-04-28
Attending: OBSTETRICS & GYNECOLOGY
Payer: COMMERCIAL

## 2021-04-28 VITALS
DIASTOLIC BLOOD PRESSURE: 74 MMHG | WEIGHT: 138.25 LBS | BODY MASS INDEX: 25.44 KG/M2 | SYSTOLIC BLOOD PRESSURE: 124 MMHG | HEIGHT: 62 IN

## 2021-04-28 DIAGNOSIS — Z30.430 ENCOUNTER FOR IUD INSERTION: ICD-10-CM

## 2021-04-28 DIAGNOSIS — Z01.812 PRE-PROCEDURE LAB EXAM: Primary | ICD-10-CM

## 2021-04-28 LAB
B-HCG UR QL: NEGATIVE
CTP QC/QA: YES

## 2021-04-28 PROCEDURE — 3008F PR BODY MASS INDEX (BMI) DOCUMENTED: ICD-10-PCS | Mod: CPTII,S$GLB,, | Performed by: OBSTETRICS & GYNECOLOGY

## 2021-04-28 PROCEDURE — 99999 PR PBB SHADOW E&M-EST. PATIENT-LVL III: ICD-10-PCS | Mod: PBBFAC,,, | Performed by: OBSTETRICS & GYNECOLOGY

## 2021-04-28 PROCEDURE — 99999 PR PBB SHADOW E&M-EST. PATIENT-LVL III: CPT | Mod: PBBFAC,,, | Performed by: OBSTETRICS & GYNECOLOGY

## 2021-04-28 PROCEDURE — 81025 POCT URINE PREGNANCY: ICD-10-PCS | Mod: S$GLB,,, | Performed by: OBSTETRICS & GYNECOLOGY

## 2021-04-28 PROCEDURE — 99499 UNLISTED E&M SERVICE: CPT | Mod: S$GLB,,, | Performed by: OBSTETRICS & GYNECOLOGY

## 2021-04-28 PROCEDURE — 1126F PR PAIN SEVERITY QUANTIFIED, NO PAIN PRESENT: ICD-10-PCS | Mod: S$GLB,,, | Performed by: OBSTETRICS & GYNECOLOGY

## 2021-04-28 PROCEDURE — 76857 US EXAM PELVIC LIMITED: CPT | Mod: S$GLB,,, | Performed by: OBSTETRICS & GYNECOLOGY

## 2021-04-28 PROCEDURE — 99499 NO LOS: ICD-10-PCS | Mod: S$GLB,,, | Performed by: OBSTETRICS & GYNECOLOGY

## 2021-04-28 PROCEDURE — 81025 URINE PREGNANCY TEST: CPT | Mod: S$GLB,,, | Performed by: OBSTETRICS & GYNECOLOGY

## 2021-04-28 PROCEDURE — 3008F BODY MASS INDEX DOCD: CPT | Mod: CPTII,S$GLB,, | Performed by: OBSTETRICS & GYNECOLOGY

## 2021-04-28 PROCEDURE — 1126F AMNT PAIN NOTED NONE PRSNT: CPT | Mod: S$GLB,,, | Performed by: OBSTETRICS & GYNECOLOGY

## 2021-04-28 PROCEDURE — 76857 PR US PELVIS, NON-OB: ICD-10-PCS | Mod: S$GLB,,, | Performed by: OBSTETRICS & GYNECOLOGY

## 2021-04-28 PROCEDURE — 58300 INSERT INTRAUTERINE DEVICE: CPT | Mod: S$GLB,,, | Performed by: OBSTETRICS & GYNECOLOGY

## 2021-04-28 PROCEDURE — 58300 INSERTION OF IUD: ICD-10-PCS | Mod: S$GLB,,, | Performed by: OBSTETRICS & GYNECOLOGY

## 2021-05-13 ENCOUNTER — OFFICE VISIT (OUTPATIENT)
Dept: PODIATRY | Facility: CLINIC | Age: 36
End: 2021-05-13
Payer: COMMERCIAL

## 2021-05-13 VITALS
BODY MASS INDEX: 25.4 KG/M2 | HEIGHT: 62 IN | DIASTOLIC BLOOD PRESSURE: 74 MMHG | SYSTOLIC BLOOD PRESSURE: 111 MMHG | HEART RATE: 78 BPM | WEIGHT: 138 LBS

## 2021-05-13 DIAGNOSIS — M20.42 HAMMER TOES OF BOTH FEET: Primary | ICD-10-CM

## 2021-05-13 DIAGNOSIS — M20.41 HAMMER TOES OF BOTH FEET: Primary | ICD-10-CM

## 2021-05-13 PROCEDURE — 3008F PR BODY MASS INDEX (BMI) DOCUMENTED: ICD-10-PCS | Mod: CPTII,S$GLB,, | Performed by: PODIATRIST

## 2021-05-13 PROCEDURE — 99213 OFFICE O/P EST LOW 20 MIN: CPT | Mod: S$GLB,,, | Performed by: PODIATRIST

## 2021-05-13 PROCEDURE — 99999 PR PBB SHADOW E&M-EST. PATIENT-LVL III: CPT | Mod: PBBFAC,,, | Performed by: PODIATRIST

## 2021-05-13 PROCEDURE — 99999 PR PBB SHADOW E&M-EST. PATIENT-LVL III: ICD-10-PCS | Mod: PBBFAC,,, | Performed by: PODIATRIST

## 2021-05-13 PROCEDURE — 1126F PR PAIN SEVERITY QUANTIFIED, NO PAIN PRESENT: ICD-10-PCS | Mod: S$GLB,,, | Performed by: PODIATRIST

## 2021-05-13 PROCEDURE — 99213 PR OFFICE/OUTPT VISIT, EST, LEVL III, 20-29 MIN: ICD-10-PCS | Mod: S$GLB,,, | Performed by: PODIATRIST

## 2021-05-13 PROCEDURE — 1126F AMNT PAIN NOTED NONE PRSNT: CPT | Mod: S$GLB,,, | Performed by: PODIATRIST

## 2021-05-13 PROCEDURE — 3008F BODY MASS INDEX DOCD: CPT | Mod: CPTII,S$GLB,, | Performed by: PODIATRIST

## 2021-06-01 ENCOUNTER — OFFICE VISIT (OUTPATIENT)
Dept: OBSTETRICS AND GYNECOLOGY | Facility: CLINIC | Age: 36
End: 2021-06-01
Attending: OBSTETRICS & GYNECOLOGY
Payer: COMMERCIAL

## 2021-06-01 VITALS
WEIGHT: 137.69 LBS | BODY MASS INDEX: 25.34 KG/M2 | DIASTOLIC BLOOD PRESSURE: 78 MMHG | SYSTOLIC BLOOD PRESSURE: 112 MMHG | HEIGHT: 62 IN

## 2021-06-01 DIAGNOSIS — Z30.431 IUD CHECK UP: Primary | ICD-10-CM

## 2021-06-01 PROCEDURE — 99999 PR PBB SHADOW E&M-EST. PATIENT-LVL III: CPT | Mod: PBBFAC,,, | Performed by: OBSTETRICS & GYNECOLOGY

## 2021-06-01 PROCEDURE — 1126F AMNT PAIN NOTED NONE PRSNT: CPT | Mod: S$GLB,,, | Performed by: OBSTETRICS & GYNECOLOGY

## 2021-06-01 PROCEDURE — 3008F PR BODY MASS INDEX (BMI) DOCUMENTED: ICD-10-PCS | Mod: CPTII,S$GLB,, | Performed by: OBSTETRICS & GYNECOLOGY

## 2021-06-01 PROCEDURE — 1126F PR PAIN SEVERITY QUANTIFIED, NO PAIN PRESENT: ICD-10-PCS | Mod: S$GLB,,, | Performed by: OBSTETRICS & GYNECOLOGY

## 2021-06-01 PROCEDURE — 99213 PR OFFICE/OUTPT VISIT, EST, LEVL III, 20-29 MIN: ICD-10-PCS | Mod: S$GLB,,, | Performed by: OBSTETRICS & GYNECOLOGY

## 2021-06-01 PROCEDURE — 3008F BODY MASS INDEX DOCD: CPT | Mod: CPTII,S$GLB,, | Performed by: OBSTETRICS & GYNECOLOGY

## 2021-06-01 PROCEDURE — 99999 PR PBB SHADOW E&M-EST. PATIENT-LVL III: ICD-10-PCS | Mod: PBBFAC,,, | Performed by: OBSTETRICS & GYNECOLOGY

## 2021-06-01 PROCEDURE — 99213 OFFICE O/P EST LOW 20 MIN: CPT | Mod: S$GLB,,, | Performed by: OBSTETRICS & GYNECOLOGY

## 2021-06-01 RX ORDER — HYDROCORTISONE 25 MG/G
CREAM TOPICAL
COMMUNITY
Start: 2020-12-21 | End: 2021-08-20

## 2021-07-27 ENCOUNTER — OFFICE VISIT (OUTPATIENT)
Dept: URGENT CARE | Facility: CLINIC | Age: 36
End: 2021-07-27
Payer: COMMERCIAL

## 2021-07-27 VITALS
RESPIRATION RATE: 19 BRPM | BODY MASS INDEX: 24.84 KG/M2 | DIASTOLIC BLOOD PRESSURE: 79 MMHG | HEART RATE: 91 BPM | SYSTOLIC BLOOD PRESSURE: 119 MMHG | OXYGEN SATURATION: 100 % | TEMPERATURE: 99 F | HEIGHT: 62 IN | WEIGHT: 135 LBS

## 2021-07-27 DIAGNOSIS — Z20.828 EXPOSURE TO SARS-ASSOCIATED CORONAVIRUS: ICD-10-CM

## 2021-07-27 DIAGNOSIS — R06.02 SHORTNESS OF BREATH: ICD-10-CM

## 2021-07-27 DIAGNOSIS — U07.1 COVID-19 VIRUS DETECTED: ICD-10-CM

## 2021-07-27 DIAGNOSIS — U07.1 COVID-19 VIRUS INFECTION: Primary | ICD-10-CM

## 2021-07-27 DIAGNOSIS — R05.9 COUGH: ICD-10-CM

## 2021-07-27 LAB
CTP QC/QA: YES
SARS-COV-2 RDRP RESP QL NAA+PROBE: POSITIVE

## 2021-07-27 PROCEDURE — U0002 COVID-19 LAB TEST NON-CDC: HCPCS | Mod: QW,S$GLB,, | Performed by: NURSE PRACTITIONER

## 2021-07-27 PROCEDURE — 99213 OFFICE O/P EST LOW 20 MIN: CPT | Mod: S$GLB,,, | Performed by: NURSE PRACTITIONER

## 2021-07-27 PROCEDURE — 99213 PR OFFICE/OUTPT VISIT, EST, LEVL III, 20-29 MIN: ICD-10-PCS | Mod: S$GLB,,, | Performed by: NURSE PRACTITIONER

## 2021-07-27 PROCEDURE — 3008F BODY MASS INDEX DOCD: CPT | Mod: CPTII,S$GLB,, | Performed by: NURSE PRACTITIONER

## 2021-07-27 PROCEDURE — 1160F PR REVIEW ALL MEDS BY PRESCRIBER/CLIN PHARMACIST DOCUMENTED: ICD-10-PCS | Mod: CPTII,S$GLB,, | Performed by: NURSE PRACTITIONER

## 2021-07-27 PROCEDURE — U0002: ICD-10-PCS | Mod: QW,S$GLB,, | Performed by: NURSE PRACTITIONER

## 2021-07-27 PROCEDURE — 1159F PR MEDICATION LIST DOCUMENTED IN MEDICAL RECORD: ICD-10-PCS | Mod: CPTII,S$GLB,, | Performed by: NURSE PRACTITIONER

## 2021-07-27 PROCEDURE — 3008F PR BODY MASS INDEX (BMI) DOCUMENTED: ICD-10-PCS | Mod: CPTII,S$GLB,, | Performed by: NURSE PRACTITIONER

## 2021-07-27 PROCEDURE — 1159F MED LIST DOCD IN RCRD: CPT | Mod: CPTII,S$GLB,, | Performed by: NURSE PRACTITIONER

## 2021-07-27 PROCEDURE — 1160F RVW MEDS BY RX/DR IN RCRD: CPT | Mod: CPTII,S$GLB,, | Performed by: NURSE PRACTITIONER

## 2021-07-27 RX ORDER — ALBUTEROL SULFATE 90 UG/1
1-2 AEROSOL, METERED RESPIRATORY (INHALATION)
Qty: 8 G | Refills: 0 | Status: SHIPPED | OUTPATIENT
Start: 2021-07-27 | End: 2021-07-27

## 2021-07-27 RX ORDER — ALBUTEROL SULFATE 90 UG/1
1-2 AEROSOL, METERED RESPIRATORY (INHALATION)
Qty: 8 G | Refills: 0 | Status: SHIPPED | OUTPATIENT
Start: 2021-07-27 | End: 2023-01-26 | Stop reason: ALTCHOICE

## 2021-07-30 ENCOUNTER — INFUSION (OUTPATIENT)
Dept: INFECTIOUS DISEASES | Facility: HOSPITAL | Age: 36
End: 2021-07-30
Payer: COMMERCIAL

## 2021-07-30 VITALS
OXYGEN SATURATION: 99 % | DIASTOLIC BLOOD PRESSURE: 73 MMHG | RESPIRATION RATE: 16 BRPM | HEART RATE: 72 BPM | TEMPERATURE: 98 F | SYSTOLIC BLOOD PRESSURE: 107 MMHG

## 2021-07-30 DIAGNOSIS — U07.1 COVID-19: Primary | ICD-10-CM

## 2021-07-30 PROCEDURE — M0243 CASIRIVI AND IMDEVI INFUSION: HCPCS | Performed by: INTERNAL MEDICINE

## 2021-07-30 PROCEDURE — 63600175 PHARM REV CODE 636 W HCPCS: Performed by: INTERNAL MEDICINE

## 2021-07-30 PROCEDURE — 25000003 PHARM REV CODE 250: Performed by: INTERNAL MEDICINE

## 2021-07-30 RX ORDER — ALBUTEROL SULFATE 90 UG/1
2 AEROSOL, METERED RESPIRATORY (INHALATION)
Status: DISCONTINUED | OUTPATIENT
Start: 2021-07-30 | End: 2021-12-03

## 2021-07-30 RX ORDER — EPINEPHRINE 0.3 MG/.3ML
0.3 INJECTION SUBCUTANEOUS
Status: DISCONTINUED | OUTPATIENT
Start: 2021-07-30 | End: 2021-08-20

## 2021-07-30 RX ORDER — ONDANSETRON 4 MG/1
4 TABLET, ORALLY DISINTEGRATING ORAL ONCE AS NEEDED
Status: DISCONTINUED | OUTPATIENT
Start: 2021-07-30 | End: 2021-08-20

## 2021-07-30 RX ORDER — ACETAMINOPHEN 325 MG/1
650 TABLET ORAL ONCE AS NEEDED
Status: DISCONTINUED | OUTPATIENT
Start: 2021-07-30 | End: 2021-12-03

## 2021-07-30 RX ORDER — DIPHENHYDRAMINE HYDROCHLORIDE 50 MG/ML
25 INJECTION INTRAMUSCULAR; INTRAVENOUS ONCE AS NEEDED
Status: DISCONTINUED | OUTPATIENT
Start: 2021-07-30 | End: 2021-08-20

## 2021-07-30 RX ORDER — SODIUM CHLORIDE 0.9 % (FLUSH) 0.9 %
10 SYRINGE (ML) INJECTION
Status: DISCONTINUED | OUTPATIENT
Start: 2021-07-30 | End: 2021-08-20

## 2021-07-30 RX ADMIN — CASIRIVIMAB AND IMDEVIMAB 600 MG: 600; 600 INJECTION, SOLUTION, CONCENTRATE INTRAVENOUS at 04:07

## 2021-08-09 ENCOUNTER — HOSPITAL ENCOUNTER (EMERGENCY)
Facility: HOSPITAL | Age: 36
Discharge: HOME | End: 2021-08-10
Attending: EMERGENCY MEDICINE
Payer: COMMERCIAL

## 2021-08-09 DIAGNOSIS — R06.02 SHORTNESS OF BREATH: Primary | ICD-10-CM

## 2021-08-09 DIAGNOSIS — R07.9 CHEST PAIN: ICD-10-CM

## 2021-08-09 DIAGNOSIS — M79.606 LEG PAIN: ICD-10-CM

## 2021-08-09 LAB
B-HCG UR QL: NEGATIVE
BUN SERPL-MCNC: 10 MG/DL (ref 6–30)
CHLORIDE SERPL-SCNC: 102 MMOL/L (ref 95–110)
CREAT SERPL-MCNC: 0.6 MG/DL (ref 0.5–1.4)
CTP QC/QA: YES
CTP QC/QA: YES
D DIMER PPP IA.FEU-MCNC: 0.64 MG/L FEU
GLUCOSE SERPL-MCNC: 90 MG/DL (ref 70–110)
HCT VFR BLD CALC: 35 %PCV (ref 36–54)
POC IONIZED CALCIUM: 1.13 MMOL/L (ref 1.06–1.42)
POC TCO2 (MEASURED): 27 MMOL/L (ref 23–29)
POTASSIUM BLD-SCNC: 4 MMOL/L (ref 3.5–5.1)
SAMPLE: ABNORMAL
SARS-COV-2 RDRP RESP QL NAA+PROBE: NEGATIVE
SODIUM BLD-SCNC: 139 MMOL/L (ref 136–145)
TROPONIN I SERPL DL<=0.01 NG/ML-MCNC: <0.006 NG/ML (ref 0–0.03)

## 2021-08-09 PROCEDURE — 99285 EMERGENCY DEPT VISIT HI MDM: CPT | Mod: CR,CS,, | Performed by: PHYSICIAN ASSISTANT

## 2021-08-09 PROCEDURE — U0002 COVID-19 LAB TEST NON-CDC: HCPCS | Performed by: EMERGENCY MEDICINE

## 2021-08-09 PROCEDURE — 63600175 PHARM REV CODE 636 W HCPCS: Performed by: PHYSICIAN ASSISTANT

## 2021-08-09 PROCEDURE — 81025 URINE PREGNANCY TEST: CPT | Performed by: PHYSICIAN ASSISTANT

## 2021-08-09 PROCEDURE — 85379 FIBRIN DEGRADATION QUANT: CPT | Performed by: PHYSICIAN ASSISTANT

## 2021-08-09 PROCEDURE — 87389 HIV-1 AG W/HIV-1&-2 AB AG IA: CPT | Performed by: EMERGENCY MEDICINE

## 2021-08-09 PROCEDURE — 93010 ELECTROCARDIOGRAM REPORT: CPT | Mod: ,,, | Performed by: INTERNAL MEDICINE

## 2021-08-09 PROCEDURE — 99285 PR EMERGENCY DEPT VISIT,LEVEL V: ICD-10-PCS | Mod: CR,CS,, | Performed by: PHYSICIAN ASSISTANT

## 2021-08-09 PROCEDURE — 93005 ELECTROCARDIOGRAM TRACING: CPT

## 2021-08-09 PROCEDURE — 99285 EMERGENCY DEPT VISIT HI MDM: CPT | Mod: 25

## 2021-08-09 PROCEDURE — 96374 THER/PROPH/DIAG INJ IV PUSH: CPT

## 2021-08-09 PROCEDURE — 86803 HEPATITIS C AB TEST: CPT | Performed by: EMERGENCY MEDICINE

## 2021-08-09 PROCEDURE — 93010 EKG 12-LEAD: ICD-10-PCS | Mod: ,,, | Performed by: INTERNAL MEDICINE

## 2021-08-09 PROCEDURE — 80047 BASIC METABLC PNL IONIZED CA: CPT | Mod: 59

## 2021-08-09 PROCEDURE — 84484 ASSAY OF TROPONIN QUANT: CPT | Performed by: PHYSICIAN ASSISTANT

## 2021-08-09 RX ORDER — KETOROLAC TROMETHAMINE 30 MG/ML
10 INJECTION, SOLUTION INTRAMUSCULAR; INTRAVENOUS
Status: COMPLETED | OUTPATIENT
Start: 2021-08-09 | End: 2021-08-09

## 2021-08-09 RX ADMIN — KETOROLAC TROMETHAMINE 10 MG: 30 INJECTION, SOLUTION INTRAMUSCULAR; INTRAVENOUS at 09:08

## 2021-08-09 RX ADMIN — IOHEXOL 75 ML: 350 INJECTION, SOLUTION INTRAVENOUS at 11:08

## 2021-08-10 ENCOUNTER — TELEMEDICINE (OUTPATIENT)
Dept: INTERNAL MEDICINE | Facility: CLINIC | Age: 36
End: 2021-08-10
Payer: OTHER GOVERNMENT

## 2021-08-10 VITALS
RESPIRATION RATE: 16 BRPM | HEART RATE: 59 BPM | SYSTOLIC BLOOD PRESSURE: 104 MMHG | DIASTOLIC BLOOD PRESSURE: 66 MMHG | TEMPERATURE: 99 F | OXYGEN SATURATION: 100 % | HEIGHT: 62 IN | WEIGHT: 135 LBS | BODY MASS INDEX: 24.84 KG/M2

## 2021-08-10 LAB
HCV AB SERPL QL IA: NEGATIVE
HIV 1+2 AB+HIV1 P24 AG SERPL QL IA: NEGATIVE

## 2021-08-10 PROCEDURE — 25500020 PHARM REV CODE 255: Performed by: EMERGENCY MEDICINE

## 2021-08-10 RX ORDER — NAPROXEN 500 MG/1
500 TABLET ORAL 2 TIMES DAILY WITH MEALS
Qty: 30 TABLET | Refills: 0 | Status: SHIPPED | OUTPATIENT
Start: 2021-08-10 | End: 2021-08-20

## 2021-08-10 RX ORDER — METHOCARBAMOL 500 MG/1
1000 TABLET, FILM COATED ORAL 3 TIMES DAILY PRN
Qty: 30 TABLET | Refills: 0 | Status: SHIPPED | OUTPATIENT
Start: 2021-08-10 | End: 2021-08-15

## 2021-08-11 ENCOUNTER — PES CALL (OUTPATIENT)
Dept: ADMINISTRATIVE | Facility: CLINIC | Age: 36
End: 2021-08-11

## 2021-08-20 ENCOUNTER — OFFICE VISIT (OUTPATIENT)
Dept: CARDIOLOGY | Facility: CLINIC | Age: 36
End: 2021-08-20
Payer: OTHER GOVERNMENT

## 2021-08-20 VITALS
OXYGEN SATURATION: 98 % | BODY MASS INDEX: 25.03 KG/M2 | WEIGHT: 136 LBS | SYSTOLIC BLOOD PRESSURE: 100 MMHG | HEIGHT: 62 IN | DIASTOLIC BLOOD PRESSURE: 69 MMHG | HEART RATE: 72 BPM

## 2021-08-20 DIAGNOSIS — R07.89 CHEST PRESSURE: ICD-10-CM

## 2021-08-20 DIAGNOSIS — Z86.16 HISTORY OF COVID-19: Primary | ICD-10-CM

## 2021-08-20 DIAGNOSIS — R06.09 DOE (DYSPNEA ON EXERTION): ICD-10-CM

## 2021-08-20 PROCEDURE — 99214 PR OFFICE/OUTPT VISIT, EST, LEVL IV, 30-39 MIN: ICD-10-PCS | Mod: S$GLB,,, | Performed by: INTERNAL MEDICINE

## 2021-08-20 PROCEDURE — 99214 OFFICE O/P EST MOD 30 MIN: CPT | Mod: S$GLB,,, | Performed by: INTERNAL MEDICINE

## 2021-08-20 RX ORDER — IBUPROFEN 800 MG/1
800 TABLET ORAL 3 TIMES DAILY
Qty: 21 TABLET | Refills: 0 | Status: SHIPPED | OUTPATIENT
Start: 2021-08-20 | End: 2023-01-18

## 2021-08-21 ENCOUNTER — LAB VISIT (OUTPATIENT)
Dept: LAB | Facility: HOSPITAL | Age: 36
End: 2021-08-21
Attending: INTERNAL MEDICINE
Payer: COMMERCIAL

## 2021-08-21 DIAGNOSIS — Z86.16 HISTORY OF COVID-19: ICD-10-CM

## 2021-08-21 LAB
CRP SERPL-MCNC: 0.3 MG/L (ref 0–8.2)
ERYTHROCYTE [SEDIMENTATION RATE] IN BLOOD BY WESTERGREN METHOD: 13 MM/HR (ref 0–36)
TROPONIN I SERPL DL<=0.01 NG/ML-MCNC: <0.006 NG/ML (ref 0–0.03)

## 2021-08-21 PROCEDURE — 86140 C-REACTIVE PROTEIN: CPT | Performed by: INTERNAL MEDICINE

## 2021-08-21 PROCEDURE — 84484 ASSAY OF TROPONIN QUANT: CPT | Performed by: INTERNAL MEDICINE

## 2021-08-21 PROCEDURE — 36415 COLL VENOUS BLD VENIPUNCTURE: CPT | Mod: PO | Performed by: INTERNAL MEDICINE

## 2021-08-21 PROCEDURE — 85652 RBC SED RATE AUTOMATED: CPT | Performed by: INTERNAL MEDICINE

## 2021-08-25 ENCOUNTER — TELEPHONE (OUTPATIENT)
Dept: CARDIOLOGY | Facility: CLINIC | Age: 36
End: 2021-08-25

## 2021-08-25 ENCOUNTER — RESEARCH ENCOUNTER (OUTPATIENT)
Dept: RESEARCH | Facility: HOSPITAL | Age: 36
End: 2021-08-25

## 2021-08-25 ENCOUNTER — HOSPITAL ENCOUNTER (OUTPATIENT)
Dept: CARDIOLOGY | Facility: HOSPITAL | Age: 36
Discharge: HOME | End: 2021-08-25
Attending: INTERNAL MEDICINE
Payer: COMMERCIAL

## 2021-08-25 VITALS
WEIGHT: 136 LBS | DIASTOLIC BLOOD PRESSURE: 70 MMHG | SYSTOLIC BLOOD PRESSURE: 120 MMHG | HEIGHT: 62 IN | HEART RATE: 72 BPM | BODY MASS INDEX: 25.03 KG/M2

## 2021-08-25 DIAGNOSIS — Z86.16 HISTORY OF COVID-19: ICD-10-CM

## 2021-08-25 DIAGNOSIS — R06.09 DOE (DYSPNEA ON EXERTION): ICD-10-CM

## 2021-08-25 DIAGNOSIS — R07.89 CHEST PRESSURE: ICD-10-CM

## 2021-08-25 LAB
ASCENDING AORTA: 2.46 CM
AV INDEX (PROSTH): 0.7
AV MEAN GRADIENT: 5 MMHG
AV PEAK GRADIENT: 9 MMHG
AV VALVE AREA: 2.33 CM2
AV VELOCITY RATIO: 0.73
BSA FOR ECHO PROCEDURE: 1.64 M2
CV ECHO LV RWT: 0.37 CM
DOP CALC AO PEAK VEL: 1.48 M/S
DOP CALC AO VTI: 31.73 CM
DOP CALC LVOT AREA: 3.3 CM2
DOP CALC LVOT DIAMETER: 2.06 CM
DOP CALC LVOT PEAK VEL: 1.08 M/S
DOP CALC LVOT STROKE VOLUME: 73.92 CM3
DOP CALCLVOT PEAK VEL VTI: 22.19 CM
E WAVE DECELERATION TIME: 145.92 MSEC
E/A RATIO: 2.52
E/E' RATIO: 11.83 M/S
ECHO LV POSTERIOR WALL: 0.81 CM (ref 0.6–1.1)
EJECTION FRACTION: 60 %
FRACTIONAL SHORTENING: 35 % (ref 28–44)
INTERVENTRICULAR SEPTUM: 0.56 CM (ref 0.6–1.1)
LA MAJOR: 4.93 CM
LA MINOR: 4.24 CM
LA WIDTH: 3.34 CM
LEFT ATRIUM SIZE: 3.24 CM
LEFT ATRIUM VOLUME INDEX MOD: 20.1 ML/M2
LEFT ATRIUM VOLUME INDEX: 25.9 ML/M2
LEFT ATRIUM VOLUME MOD: 32.58 CM3
LEFT ATRIUM VOLUME: 41.94 CM3
LEFT INTERNAL DIMENSION IN SYSTOLE: 2.86 CM (ref 2.1–4)
LEFT VENTRICLE DIASTOLIC VOLUME INDEX: 55.12 ML/M2
LEFT VENTRICLE DIASTOLIC VOLUME: 89.29 ML
LEFT VENTRICLE MASS INDEX: 56 G/M2
LEFT VENTRICLE SYSTOLIC VOLUME INDEX: 19.3 ML/M2
LEFT VENTRICLE SYSTOLIC VOLUME: 31.25 ML
LEFT VENTRICULAR INTERNAL DIMENSION IN DIASTOLE: 4.43 CM (ref 3.5–6)
LEFT VENTRICULAR MASS: 90.6 G
LV LATERAL E/E' RATIO: 11.33 M/S
LV SEPTAL E/E' RATIO: 12.36 M/S
MV A" WAVE DURATION": 11.99 MSEC
MV PEAK A VEL: 0.54 M/S
MV PEAK E VEL: 1.36 M/S
MV STENOSIS PRESSURE HALF TIME: 42.32 MS
MV VALVE AREA P 1/2 METHOD: 5.2 CM2
PISA TR MAX VEL: 2.04 M/S
PULM VEIN S/D RATIO: 0.75
PV PEAK D VEL: 0.67 M/S
PV PEAK S VEL: 0.5 M/S
RA MAJOR: 3.87 CM
RA PRESSURE: 3 MMHG
RA WIDTH: 2.37 CM
RIGHT VENTRICULAR END-DIASTOLIC DIMENSION: 3 CM
RV TISSUE DOPPLER FREE WALL SYSTOLIC VELOCITY 1 (APICAL 4 CHAMBER VIEW): 13.77 CM/S
SINUS: 2.45 CM
STJ: 2.05 CM
TDI LATERAL: 0.12 M/S
TDI SEPTAL: 0.11 M/S
TDI: 0.12 M/S
TR MAX PG: 17 MMHG
TRICUSPID ANNULAR PLANE SYSTOLIC EXCURSION: 2.97 CM
TV REST PULMONARY ARTERY PRESSURE: 20 MMHG

## 2021-08-25 PROCEDURE — 93306 TTE W/DOPPLER COMPLETE: CPT | Mod: 26,,, | Performed by: INTERNAL MEDICINE

## 2021-08-25 PROCEDURE — 93306 TTE W/DOPPLER COMPLETE: CPT

## 2021-08-25 PROCEDURE — 93306 ECHO (CUPID ONLY): ICD-10-PCS | Mod: 26,,, | Performed by: INTERNAL MEDICINE

## 2021-09-29 ENCOUNTER — OFFICE VISIT (OUTPATIENT)
Dept: URGENT CARE | Facility: CLINIC | Age: 36
End: 2021-09-29
Payer: COMMERCIAL

## 2021-09-29 VITALS
SYSTOLIC BLOOD PRESSURE: 106 MMHG | TEMPERATURE: 99 F | WEIGHT: 134 LBS | HEART RATE: 78 BPM | HEIGHT: 62 IN | RESPIRATION RATE: 16 BRPM | DIASTOLIC BLOOD PRESSURE: 68 MMHG | BODY MASS INDEX: 24.66 KG/M2 | OXYGEN SATURATION: 97 %

## 2021-09-29 DIAGNOSIS — Z76.89 ENCOUNTER TO ESTABLISH CARE: ICD-10-CM
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
## 2021-09-29 DIAGNOSIS — S13.4XXA WHIPLASH INJURY TO NECK, INITIAL ENCOUNTER: Primary | ICD-10-CM

## 2021-09-29 DIAGNOSIS — M54.12 CERVICAL RADICULOPATHY: ICD-10-CM

## 2021-09-29 PROCEDURE — 1160F PR REVIEW ALL MEDS BY PRESCRIBER/CLIN PHARMACIST DOCUMENTED: ICD-10-PCS | Mod: CPTII,S$GLB,, | Performed by: STUDENT IN AN ORGANIZED HEALTH CARE EDUCATION/TRAINING PROGRAM

## 2021-09-29 PROCEDURE — 72040 X-RAY EXAM NECK SPINE 2-3 VW: CPT | Mod: FY,S$GLB,, | Performed by: RADIOLOGY

## 2021-09-29 PROCEDURE — 99213 PR OFFICE/OUTPT VISIT, EST, LEVL III, 20-29 MIN: ICD-10-PCS | Mod: S$GLB,,, | Performed by: STUDENT IN AN ORGANIZED HEALTH CARE EDUCATION/TRAINING PROGRAM

## 2021-09-29 PROCEDURE — 1159F MED LIST DOCD IN RCRD: CPT | Mod: CPTII,S$GLB,, | Performed by: STUDENT IN AN ORGANIZED HEALTH CARE EDUCATION/TRAINING PROGRAM

## 2021-09-29 PROCEDURE — 99213 OFFICE O/P EST LOW 20 MIN: CPT | Mod: S$GLB,,, | Performed by: STUDENT IN AN ORGANIZED HEALTH CARE EDUCATION/TRAINING PROGRAM

## 2021-09-29 PROCEDURE — 3074F SYST BP LT 130 MM HG: CPT | Mod: CPTII,S$GLB,, | Performed by: STUDENT IN AN ORGANIZED HEALTH CARE EDUCATION/TRAINING PROGRAM

## 2021-09-29 PROCEDURE — 72040 XR CERVICAL SPINE 2 OR 3 VIEWS: ICD-10-PCS | Mod: FY,S$GLB,, | Performed by: RADIOLOGY

## 2021-09-29 PROCEDURE — 1160F RVW MEDS BY RX/DR IN RCRD: CPT | Mod: CPTII,S$GLB,, | Performed by: STUDENT IN AN ORGANIZED HEALTH CARE EDUCATION/TRAINING PROGRAM

## 2021-09-29 PROCEDURE — 3008F PR BODY MASS INDEX (BMI) DOCUMENTED: ICD-10-PCS | Mod: CPTII,S$GLB,, | Performed by: STUDENT IN AN ORGANIZED HEALTH CARE EDUCATION/TRAINING PROGRAM

## 2021-09-29 PROCEDURE — 3078F PR MOST RECENT DIASTOLIC BLOOD PRESSURE < 80 MM HG: ICD-10-PCS | Mod: CPTII,S$GLB,, | Performed by: STUDENT IN AN ORGANIZED HEALTH CARE EDUCATION/TRAINING PROGRAM

## 2021-09-29 PROCEDURE — 3078F DIAST BP <80 MM HG: CPT | Mod: CPTII,S$GLB,, | Performed by: STUDENT IN AN ORGANIZED HEALTH CARE EDUCATION/TRAINING PROGRAM

## 2021-09-29 PROCEDURE — 1159F PR MEDICATION LIST DOCUMENTED IN MEDICAL RECORD: ICD-10-PCS | Mod: CPTII,S$GLB,, | Performed by: STUDENT IN AN ORGANIZED HEALTH CARE EDUCATION/TRAINING PROGRAM

## 2021-09-29 PROCEDURE — 3074F PR MOST RECENT SYSTOLIC BLOOD PRESSURE < 130 MM HG: ICD-10-PCS | Mod: CPTII,S$GLB,, | Performed by: STUDENT IN AN ORGANIZED HEALTH CARE EDUCATION/TRAINING PROGRAM

## 2021-09-29 PROCEDURE — 3008F BODY MASS INDEX DOCD: CPT | Mod: CPTII,S$GLB,, | Performed by: STUDENT IN AN ORGANIZED HEALTH CARE EDUCATION/TRAINING PROGRAM

## 2021-09-30 ENCOUNTER — CLINICAL SUPPORT (OUTPATIENT)
Dept: REHABILITATION | Facility: HOSPITAL | Age: 36
End: 2021-09-30
Attending: STUDENT IN AN ORGANIZED HEALTH CARE EDUCATION/TRAINING PROGRAM
Payer: COMMERCIAL

## 2021-09-30 DIAGNOSIS — H53.9 VISUAL CHANGES: ICD-10-CM

## 2021-09-30 DIAGNOSIS — R29.898 DECREASED ROM OF NECK: ICD-10-CM

## 2021-09-30 DIAGNOSIS — R52 PAIN: ICD-10-CM

## 2021-09-30 PROCEDURE — 97162 PT EVAL MOD COMPLEX 30 MIN: CPT | Mod: PO

## 2021-10-01 ENCOUNTER — CLINICAL SUPPORT (OUTPATIENT)
Dept: REHABILITATION | Facility: HOSPITAL | Age: 36
End: 2021-10-01
Attending: STUDENT IN AN ORGANIZED HEALTH CARE EDUCATION/TRAINING PROGRAM
Payer: COMMERCIAL

## 2021-10-01 ENCOUNTER — PATIENT MESSAGE (OUTPATIENT)
Dept: URGENT CARE | Facility: CLINIC | Age: 36
End: 2021-10-01

## 2021-10-01 DIAGNOSIS — H53.9 VISUAL CHANGES: ICD-10-CM

## 2021-10-01 DIAGNOSIS — R52 PAIN: ICD-10-CM

## 2021-10-01 DIAGNOSIS — R29.898 DECREASED ROM OF NECK: ICD-10-CM

## 2021-10-01 PROCEDURE — 97112 NEUROMUSCULAR REEDUCATION: CPT | Mod: PO

## 2021-10-01 PROCEDURE — 97110 THERAPEUTIC EXERCISES: CPT | Mod: PO

## 2021-10-01 PROCEDURE — 97140 MANUAL THERAPY 1/> REGIONS: CPT | Mod: PO

## 2021-10-05 ENCOUNTER — CLINICAL SUPPORT (OUTPATIENT)
Dept: REHABILITATION | Facility: HOSPITAL | Age: 36
End: 2021-10-05
Attending: STUDENT IN AN ORGANIZED HEALTH CARE EDUCATION/TRAINING PROGRAM
Payer: COMMERCIAL

## 2021-10-05 DIAGNOSIS — H53.9 VISUAL CHANGES: ICD-10-CM

## 2021-10-05 DIAGNOSIS — R29.898 DECREASED ROM OF NECK: ICD-10-CM

## 2021-10-05 DIAGNOSIS — R52 PAIN: ICD-10-CM

## 2021-10-05 PROCEDURE — 97110 THERAPEUTIC EXERCISES: CPT | Mod: PO

## 2021-10-05 PROCEDURE — 97140 MANUAL THERAPY 1/> REGIONS: CPT | Mod: PO

## 2021-10-07 ENCOUNTER — CLINICAL SUPPORT (OUTPATIENT)
Dept: REHABILITATION | Facility: HOSPITAL | Age: 36
End: 2021-10-07
Attending: STUDENT IN AN ORGANIZED HEALTH CARE EDUCATION/TRAINING PROGRAM
Payer: COMMERCIAL

## 2021-10-07 DIAGNOSIS — H53.9 VISUAL CHANGES: ICD-10-CM

## 2021-10-07 DIAGNOSIS — R52 PAIN: ICD-10-CM

## 2021-10-07 DIAGNOSIS — R29.898 DECREASED ROM OF NECK: ICD-10-CM

## 2021-10-07 PROCEDURE — 97140 MANUAL THERAPY 1/> REGIONS: CPT | Mod: PO

## 2021-10-07 PROCEDURE — 97110 THERAPEUTIC EXERCISES: CPT | Mod: PO

## 2021-10-11 ENCOUNTER — CLINICAL SUPPORT (OUTPATIENT)
Dept: REHABILITATION | Facility: HOSPITAL | Age: 36
End: 2021-10-11
Attending: STUDENT IN AN ORGANIZED HEALTH CARE EDUCATION/TRAINING PROGRAM
Payer: COMMERCIAL

## 2021-10-11 DIAGNOSIS — R52 PAIN: ICD-10-CM

## 2021-10-11 DIAGNOSIS — R29.898 DECREASED ROM OF NECK: ICD-10-CM

## 2021-10-11 DIAGNOSIS — H53.9 VISUAL CHANGES: ICD-10-CM

## 2021-10-11 PROCEDURE — 97140 MANUAL THERAPY 1/> REGIONS: CPT | Mod: PO

## 2021-10-11 PROCEDURE — 97110 THERAPEUTIC EXERCISES: CPT | Mod: PO

## 2021-10-12 ENCOUNTER — OFFICE VISIT (OUTPATIENT)
Dept: INTERNAL MEDICINE | Facility: CLINIC | Age: 36
End: 2021-10-12
Payer: COMMERCIAL

## 2021-10-12 VITALS
WEIGHT: 139.13 LBS | HEIGHT: 62 IN | HEART RATE: 65 BPM | SYSTOLIC BLOOD PRESSURE: 96 MMHG | DIASTOLIC BLOOD PRESSURE: 70 MMHG | RESPIRATION RATE: 16 BRPM | BODY MASS INDEX: 25.6 KG/M2 | TEMPERATURE: 98 F

## 2021-10-12 DIAGNOSIS — M54.2 CERVICAL PAIN: ICD-10-CM

## 2021-10-12 DIAGNOSIS — Z87.820 H/O CONCUSSION: ICD-10-CM

## 2021-10-12 DIAGNOSIS — Z23 NEED FOR PROPHYLACTIC VACCINATION AND INOCULATION AGAINST INFLUENZA: ICD-10-CM

## 2021-10-12 DIAGNOSIS — Z00.00 WELL ADULT EXAM: Primary | ICD-10-CM

## 2021-10-12 DIAGNOSIS — R29.898 DECREASED ROM OF NECK: ICD-10-CM

## 2021-10-12 DIAGNOSIS — H53.9 VISUAL CHANGES: ICD-10-CM

## 2021-10-12 PROCEDURE — 99385 PREV VISIT NEW AGE 18-39: CPT | Mod: 25,S$GLB,, | Performed by: FAMILY MEDICINE

## 2021-10-12 PROCEDURE — 90686 IIV4 VACC NO PRSV 0.5 ML IM: CPT | Mod: S$GLB,,, | Performed by: FAMILY MEDICINE

## 2021-10-12 PROCEDURE — 3078F DIAST BP <80 MM HG: CPT | Mod: CPTII,S$GLB,, | Performed by: FAMILY MEDICINE

## 2021-10-12 PROCEDURE — 3074F PR MOST RECENT SYSTOLIC BLOOD PRESSURE < 130 MM HG: ICD-10-PCS | Mod: CPTII,S$GLB,, | Performed by: FAMILY MEDICINE

## 2021-10-12 PROCEDURE — 3078F PR MOST RECENT DIASTOLIC BLOOD PRESSURE < 80 MM HG: ICD-10-PCS | Mod: CPTII,S$GLB,, | Performed by: FAMILY MEDICINE

## 2021-10-12 PROCEDURE — 1159F MED LIST DOCD IN RCRD: CPT | Mod: CPTII,S$GLB,, | Performed by: FAMILY MEDICINE

## 2021-10-12 PROCEDURE — 3008F PR BODY MASS INDEX (BMI) DOCUMENTED: ICD-10-PCS | Mod: CPTII,S$GLB,, | Performed by: FAMILY MEDICINE

## 2021-10-12 PROCEDURE — 1160F RVW MEDS BY RX/DR IN RCRD: CPT | Mod: CPTII,S$GLB,, | Performed by: FAMILY MEDICINE

## 2021-10-12 PROCEDURE — 90686 FLU VACCINE (QUAD) GREATER THAN OR EQUAL TO 3YO PRESERVATIVE FREE IM: ICD-10-PCS | Mod: S$GLB,,, | Performed by: FAMILY MEDICINE

## 2021-10-12 PROCEDURE — 99999 PR PBB SHADOW E&M-EST. PATIENT-LVL V: CPT | Mod: PBBFAC,,, | Performed by: FAMILY MEDICINE

## 2021-10-12 PROCEDURE — 1159F PR MEDICATION LIST DOCUMENTED IN MEDICAL RECORD: ICD-10-PCS | Mod: CPTII,S$GLB,, | Performed by: FAMILY MEDICINE

## 2021-10-12 PROCEDURE — 99385 PR PREVENTIVE VISIT,NEW,18-39: ICD-10-PCS | Mod: 25,S$GLB,, | Performed by: FAMILY MEDICINE

## 2021-10-12 PROCEDURE — 3074F SYST BP LT 130 MM HG: CPT | Mod: CPTII,S$GLB,, | Performed by: FAMILY MEDICINE

## 2021-10-12 PROCEDURE — 3008F BODY MASS INDEX DOCD: CPT | Mod: CPTII,S$GLB,, | Performed by: FAMILY MEDICINE

## 2021-10-12 PROCEDURE — 90471 IMMUNIZATION ADMIN: CPT | Mod: S$GLB,,, | Performed by: FAMILY MEDICINE

## 2021-10-12 PROCEDURE — 90471 FLU VACCINE (QUAD) GREATER THAN OR EQUAL TO 3YO PRESERVATIVE FREE IM: ICD-10-PCS | Mod: S$GLB,,, | Performed by: FAMILY MEDICINE

## 2021-10-12 PROCEDURE — 99999 PR PBB SHADOW E&M-EST. PATIENT-LVL V: ICD-10-PCS | Mod: PBBFAC,,, | Performed by: FAMILY MEDICINE

## 2021-10-12 PROCEDURE — 1160F PR REVIEW ALL MEDS BY PRESCRIBER/CLIN PHARMACIST DOCUMENTED: ICD-10-PCS | Mod: CPTII,S$GLB,, | Performed by: FAMILY MEDICINE

## 2021-10-12 RX ORDER — METHOCARBAMOL 750 MG/1
750 TABLET, FILM COATED ORAL 4 TIMES DAILY PRN
Qty: 40 TABLET | Refills: 0 | Status: SHIPPED | OUTPATIENT
Start: 2021-10-12 | End: 2021-10-22

## 2021-10-13 ENCOUNTER — DOCUMENTATION ONLY (OUTPATIENT)
Dept: REHABILITATION | Facility: HOSPITAL | Age: 36
End: 2021-10-13

## 2021-10-13 ENCOUNTER — PATIENT MESSAGE (OUTPATIENT)
Dept: INTERNAL MEDICINE | Facility: CLINIC | Age: 36
End: 2021-10-13
Payer: COMMERCIAL

## 2021-10-14 ENCOUNTER — CLINICAL SUPPORT (OUTPATIENT)
Dept: REHABILITATION | Facility: HOSPITAL | Age: 36
End: 2021-10-14
Attending: STUDENT IN AN ORGANIZED HEALTH CARE EDUCATION/TRAINING PROGRAM
Payer: COMMERCIAL

## 2021-10-14 DIAGNOSIS — H53.9 VISUAL CHANGES: ICD-10-CM

## 2021-10-14 DIAGNOSIS — R52 PAIN: ICD-10-CM

## 2021-10-14 DIAGNOSIS — R29.898 DECREASED ROM OF NECK: ICD-10-CM

## 2021-10-14 PROCEDURE — 97140 MANUAL THERAPY 1/> REGIONS: CPT | Mod: PO

## 2021-10-14 PROCEDURE — 97110 THERAPEUTIC EXERCISES: CPT | Mod: PO

## 2021-10-19 ENCOUNTER — CLINICAL SUPPORT (OUTPATIENT)
Dept: REHABILITATION | Facility: HOSPITAL | Age: 36
End: 2021-10-19
Attending: STUDENT IN AN ORGANIZED HEALTH CARE EDUCATION/TRAINING PROGRAM
Payer: COMMERCIAL

## 2021-10-19 DIAGNOSIS — R29.898 DECREASED ROM OF NECK: ICD-10-CM

## 2021-10-19 DIAGNOSIS — H53.9 VISUAL CHANGES: ICD-10-CM

## 2021-10-19 DIAGNOSIS — R52 PAIN: ICD-10-CM

## 2021-10-19 PROCEDURE — 97140 MANUAL THERAPY 1/> REGIONS: CPT | Mod: PO

## 2021-10-19 PROCEDURE — 97110 THERAPEUTIC EXERCISES: CPT | Mod: PO

## 2021-10-21 ENCOUNTER — CLINICAL SUPPORT (OUTPATIENT)
Dept: REHABILITATION | Facility: HOSPITAL | Age: 36
End: 2021-10-21
Attending: STUDENT IN AN ORGANIZED HEALTH CARE EDUCATION/TRAINING PROGRAM
Payer: COMMERCIAL

## 2021-10-21 DIAGNOSIS — R29.898 DECREASED ROM OF NECK: ICD-10-CM

## 2021-10-21 DIAGNOSIS — R52 PAIN: ICD-10-CM

## 2021-10-21 DIAGNOSIS — H53.9 VISUAL CHANGES: ICD-10-CM

## 2021-10-21 PROCEDURE — 97110 THERAPEUTIC EXERCISES: CPT | Mod: PO

## 2021-10-26 ENCOUNTER — CLINICAL SUPPORT (OUTPATIENT)
Dept: REHABILITATION | Facility: HOSPITAL | Age: 36
End: 2021-10-26
Payer: COMMERCIAL

## 2021-10-26 DIAGNOSIS — R52 PAIN: ICD-10-CM

## 2021-10-26 DIAGNOSIS — R29.898 DECREASED ROM OF NECK: ICD-10-CM

## 2021-10-26 DIAGNOSIS — H53.9 VISUAL CHANGES: ICD-10-CM

## 2021-10-26 PROCEDURE — 97140 MANUAL THERAPY 1/> REGIONS: CPT | Mod: PO

## 2021-10-26 PROCEDURE — 97110 THERAPEUTIC EXERCISES: CPT | Mod: PO

## 2021-10-28 ENCOUNTER — CLINICAL SUPPORT (OUTPATIENT)
Dept: REHABILITATION | Facility: HOSPITAL | Age: 36
End: 2021-10-28
Payer: COMMERCIAL

## 2021-10-28 DIAGNOSIS — R29.898 DECREASED ROM OF NECK: ICD-10-CM

## 2021-10-28 DIAGNOSIS — R52 PAIN: ICD-10-CM

## 2021-10-28 DIAGNOSIS — H53.9 VISUAL CHANGES: ICD-10-CM

## 2021-10-28 PROCEDURE — 97140 MANUAL THERAPY 1/> REGIONS: CPT | Mod: PO

## 2021-10-28 PROCEDURE — 97110 THERAPEUTIC EXERCISES: CPT | Mod: PO

## 2021-11-04 ENCOUNTER — CLINICAL SUPPORT (OUTPATIENT)
Dept: REHABILITATION | Facility: HOSPITAL | Age: 36
End: 2021-11-04
Payer: COMMERCIAL

## 2021-11-04 DIAGNOSIS — R29.898 DECREASED ROM OF NECK: ICD-10-CM

## 2021-11-04 DIAGNOSIS — R52 PAIN: ICD-10-CM

## 2021-11-04 DIAGNOSIS — H53.9 VISUAL CHANGES: ICD-10-CM

## 2021-11-04 PROCEDURE — 97110 THERAPEUTIC EXERCISES: CPT | Mod: PO

## 2021-11-05 ENCOUNTER — CLINICAL SUPPORT (OUTPATIENT)
Dept: REHABILITATION | Facility: HOSPITAL | Age: 36
End: 2021-11-05
Attending: FAMILY MEDICINE
Payer: COMMERCIAL

## 2021-11-05 ENCOUNTER — PATIENT MESSAGE (OUTPATIENT)
Dept: INTERNAL MEDICINE | Facility: CLINIC | Age: 36
End: 2021-11-05
Payer: COMMERCIAL

## 2021-11-05 ENCOUNTER — TELEPHONE (OUTPATIENT)
Dept: INTERNAL MEDICINE | Facility: CLINIC | Age: 36
End: 2021-11-05
Payer: COMMERCIAL

## 2021-11-05 DIAGNOSIS — H53.9 VISUAL CHANGES: ICD-10-CM

## 2021-11-05 DIAGNOSIS — M54.2 CHRONIC NECK PAIN: ICD-10-CM

## 2021-11-05 DIAGNOSIS — Z87.820 H/O CONCUSSION: ICD-10-CM

## 2021-11-05 DIAGNOSIS — M54.2 CERVICAL PAIN: ICD-10-CM

## 2021-11-05 DIAGNOSIS — R29.898 DECREASED ROM OF NECK: Primary | ICD-10-CM

## 2021-11-05 DIAGNOSIS — M54.2 CERVICALGIA: ICD-10-CM

## 2021-11-05 DIAGNOSIS — H53.9 VISION ABNORMALITIES: ICD-10-CM

## 2021-11-05 DIAGNOSIS — G89.29 CHRONIC NECK PAIN: ICD-10-CM

## 2021-11-05 DIAGNOSIS — H51.11 CONVERGENCE INSUFFICIENCY: ICD-10-CM

## 2021-11-05 PROCEDURE — 97165 OT EVAL LOW COMPLEX 30 MIN: CPT | Mod: PO

## 2021-11-05 PROCEDURE — 97530 THERAPEUTIC ACTIVITIES: CPT | Mod: PO

## 2021-11-09 ENCOUNTER — CLINICAL SUPPORT (OUTPATIENT)
Dept: REHABILITATION | Facility: HOSPITAL | Age: 36
End: 2021-11-09
Payer: COMMERCIAL

## 2021-11-09 DIAGNOSIS — R29.898 DECREASED ROM OF NECK: ICD-10-CM

## 2021-11-09 DIAGNOSIS — H53.9 VISUAL CHANGES: ICD-10-CM

## 2021-11-09 DIAGNOSIS — R52 PAIN: ICD-10-CM

## 2021-11-09 PROCEDURE — 97110 THERAPEUTIC EXERCISES: CPT | Mod: PO

## 2021-11-15 ENCOUNTER — PATIENT MESSAGE (OUTPATIENT)
Dept: REHABILITATION | Facility: HOSPITAL | Age: 36
End: 2021-11-15
Payer: COMMERCIAL

## 2021-11-16 ENCOUNTER — CLINICAL SUPPORT (OUTPATIENT)
Dept: REHABILITATION | Facility: HOSPITAL | Age: 36
End: 2021-11-16
Payer: COMMERCIAL

## 2021-11-16 DIAGNOSIS — R29.898 DECREASED ROM OF NECK: ICD-10-CM

## 2021-11-16 DIAGNOSIS — R52 PAIN: ICD-10-CM

## 2021-11-16 DIAGNOSIS — H53.9 VISUAL CHANGES: ICD-10-CM

## 2021-11-16 PROCEDURE — 97110 THERAPEUTIC EXERCISES: CPT | Mod: PO

## 2021-11-16 PROCEDURE — 97140 MANUAL THERAPY 1/> REGIONS: CPT | Mod: PO

## 2021-11-17 ENCOUNTER — CLINICAL SUPPORT (OUTPATIENT)
Dept: REHABILITATION | Facility: HOSPITAL | Age: 36
End: 2021-11-17
Payer: COMMERCIAL

## 2021-11-17 DIAGNOSIS — H53.9 VISION ABNORMALITIES: ICD-10-CM

## 2021-11-17 DIAGNOSIS — H51.11 CONVERGENCE INSUFFICIENCY: ICD-10-CM

## 2021-11-17 PROCEDURE — 97530 THERAPEUTIC ACTIVITIES: CPT | Mod: PO

## 2021-11-17 PROCEDURE — 97112 NEUROMUSCULAR REEDUCATION: CPT | Mod: PO

## 2021-11-29 ENCOUNTER — HOSPITAL ENCOUNTER (OUTPATIENT)
Dept: RADIOLOGY | Facility: OTHER | Age: 36
Discharge: HOME | End: 2021-11-29
Attending: FAMILY MEDICINE
Payer: COMMERCIAL

## 2021-11-29 ENCOUNTER — PATIENT MESSAGE (OUTPATIENT)
Dept: INTERNAL MEDICINE | Facility: CLINIC | Age: 36
End: 2021-11-29
Payer: COMMERCIAL

## 2021-11-29 DIAGNOSIS — M54.2 CHRONIC NECK PAIN: ICD-10-CM

## 2021-11-29 DIAGNOSIS — R29.898 DECREASED ROM OF NECK: ICD-10-CM

## 2021-11-29 DIAGNOSIS — G89.29 CHRONIC NECK PAIN: ICD-10-CM

## 2021-11-29 DIAGNOSIS — M54.2 CERVICALGIA: ICD-10-CM

## 2021-11-29 DIAGNOSIS — M54.2 CERVICAL PAIN: ICD-10-CM

## 2021-11-29 DIAGNOSIS — Z87.820 H/O CONCUSSION: ICD-10-CM

## 2021-11-29 DIAGNOSIS — M54.2 CERVICALGIA: Primary | ICD-10-CM

## 2021-11-29 PROCEDURE — 72141 MRI NECK SPINE W/O DYE: CPT | Mod: TC

## 2021-11-29 PROCEDURE — 72141 MRI NECK SPINE W/O DYE: CPT | Mod: 26,,, | Performed by: RADIOLOGY

## 2021-11-29 PROCEDURE — 72141 MRI CERVICAL SPINE WITHOUT CONTRAST: ICD-10-PCS | Mod: 26,,, | Performed by: RADIOLOGY

## 2021-12-01 ENCOUNTER — CLINICAL SUPPORT (OUTPATIENT)
Dept: REHABILITATION | Facility: HOSPITAL | Age: 36
End: 2021-12-01
Payer: COMMERCIAL

## 2021-12-01 ENCOUNTER — HOSPITAL ENCOUNTER (OUTPATIENT)
Dept: RADIOLOGY | Facility: HOSPITAL | Age: 36
Discharge: HOME | End: 2021-12-01
Attending: ORTHOPAEDIC SURGERY
Payer: COMMERCIAL

## 2021-12-01 ENCOUNTER — OFFICE VISIT (OUTPATIENT)
Dept: ORTHOPEDICS | Facility: CLINIC | Age: 36
End: 2021-12-01
Payer: COMMERCIAL

## 2021-12-01 VITALS — BODY MASS INDEX: 25.48 KG/M2 | WEIGHT: 138.44 LBS | HEIGHT: 62 IN

## 2021-12-01 DIAGNOSIS — M54.2 CHRONIC NECK PAIN: ICD-10-CM

## 2021-12-01 DIAGNOSIS — H53.9 VISION ABNORMALITIES: ICD-10-CM

## 2021-12-01 DIAGNOSIS — M50.30 DDD (DEGENERATIVE DISC DISEASE), CERVICAL: ICD-10-CM

## 2021-12-01 DIAGNOSIS — G89.29 CHRONIC NECK PAIN: ICD-10-CM

## 2021-12-01 DIAGNOSIS — M54.2 CERVICALGIA: ICD-10-CM

## 2021-12-01 DIAGNOSIS — R29.898 DECREASED ROM OF NECK: ICD-10-CM

## 2021-12-01 DIAGNOSIS — H51.11 CONVERGENCE INSUFFICIENCY: ICD-10-CM

## 2021-12-01 PROCEDURE — 72050 X-RAY EXAM NECK SPINE 4/5VWS: CPT | Mod: 26,,, | Performed by: RADIOLOGY

## 2021-12-01 PROCEDURE — 99999 PR PBB SHADOW E&M-EST. PATIENT-LVL III: CPT | Mod: PBBFAC,,, | Performed by: ORTHOPAEDIC SURGERY

## 2021-12-01 PROCEDURE — 99204 OFFICE O/P NEW MOD 45 MIN: CPT | Mod: S$GLB,,, | Performed by: ORTHOPAEDIC SURGERY

## 2021-12-01 PROCEDURE — 97112 NEUROMUSCULAR REEDUCATION: CPT | Mod: PO

## 2021-12-01 PROCEDURE — 72050 XR CERVICAL SPINE AP LAT WITH FLEX EXTEN: ICD-10-PCS | Mod: 26,,, | Performed by: RADIOLOGY

## 2021-12-01 PROCEDURE — 99999 PR PBB SHADOW E&M-EST. PATIENT-LVL III: ICD-10-PCS | Mod: PBBFAC,,, | Performed by: ORTHOPAEDIC SURGERY

## 2021-12-01 PROCEDURE — 99204 PR OFFICE/OUTPT VISIT, NEW, LEVL IV, 45-59 MIN: ICD-10-PCS | Mod: S$GLB,,, | Performed by: ORTHOPAEDIC SURGERY

## 2021-12-01 PROCEDURE — 72050 X-RAY EXAM NECK SPINE 4/5VWS: CPT | Mod: TC

## 2021-12-02 DIAGNOSIS — M54.12 CERVICAL RADICULOPATHY: Primary | ICD-10-CM

## 2021-12-03 ENCOUNTER — OFFICE VISIT (OUTPATIENT)
Dept: CARDIOLOGY | Facility: CLINIC | Age: 36
End: 2021-12-03
Payer: COMMERCIAL

## 2021-12-03 VITALS
BODY MASS INDEX: 25.33 KG/M2 | HEART RATE: 76 BPM | HEIGHT: 62 IN | SYSTOLIC BLOOD PRESSURE: 109 MMHG | WEIGHT: 137.63 LBS | DIASTOLIC BLOOD PRESSURE: 71 MMHG

## 2021-12-03 DIAGNOSIS — Z86.16 HISTORY OF COVID-19: Primary | ICD-10-CM

## 2021-12-03 DIAGNOSIS — Z00.00 WELLNESS EXAMINATION: ICD-10-CM

## 2021-12-03 DIAGNOSIS — Z87.898 HISTORY OF CHEST PAIN: ICD-10-CM

## 2021-12-03 PROCEDURE — 99214 OFFICE O/P EST MOD 30 MIN: CPT | Mod: S$GLB,,, | Performed by: INTERNAL MEDICINE

## 2021-12-03 PROCEDURE — 99214 PR OFFICE/OUTPT VISIT, EST, LEVL IV, 30-39 MIN: ICD-10-PCS | Mod: S$GLB,,, | Performed by: INTERNAL MEDICINE

## 2021-12-14 ENCOUNTER — OFFICE VISIT (OUTPATIENT)
Dept: NEUROLOGY | Facility: CLINIC | Age: 36
End: 2021-12-14
Payer: COMMERCIAL

## 2021-12-14 ENCOUNTER — CLINICAL SUPPORT (OUTPATIENT)
Dept: REHABILITATION | Facility: HOSPITAL | Age: 36
End: 2021-12-14
Payer: COMMERCIAL

## 2021-12-14 DIAGNOSIS — R29.898 DECREASED ROM OF NECK: ICD-10-CM

## 2021-12-14 DIAGNOSIS — H53.9 VISION ABNORMALITIES: ICD-10-CM

## 2021-12-14 DIAGNOSIS — S13.4XXD WHIPLASH INJURY TO NECK, SUBSEQUENT ENCOUNTER: ICD-10-CM

## 2021-12-14 DIAGNOSIS — M50.30 DDD (DEGENERATIVE DISC DISEASE), CERVICAL: ICD-10-CM

## 2021-12-14 DIAGNOSIS — F07.81 POST-CONCUSSION SYNDROME: Primary | ICD-10-CM

## 2021-12-14 DIAGNOSIS — H51.11 CONVERGENCE INSUFFICIENCY: ICD-10-CM

## 2021-12-14 DIAGNOSIS — M50.122 CERVICAL DISC DISORDER AT C5-C6 LEVEL WITH RADICULOPATHY: ICD-10-CM

## 2021-12-14 DIAGNOSIS — G89.29 CHRONIC BILATERAL LOW BACK PAIN WITHOUT SCIATICA: ICD-10-CM

## 2021-12-14 DIAGNOSIS — M54.50 CHRONIC BILATERAL LOW BACK PAIN WITHOUT SCIATICA: ICD-10-CM

## 2021-12-14 PROCEDURE — 97112 NEUROMUSCULAR REEDUCATION: CPT | Mod: PO

## 2021-12-14 PROCEDURE — 99204 OFFICE O/P NEW MOD 45 MIN: CPT | Mod: 95,,, | Performed by: PSYCHIATRY & NEUROLOGY

## 2021-12-14 PROCEDURE — 99204 PR OFFICE/OUTPT VISIT, NEW, LEVL IV, 45-59 MIN: ICD-10-PCS | Mod: 95,,, | Performed by: PSYCHIATRY & NEUROLOGY

## 2021-12-14 RX ORDER — DICLOFENAC SODIUM 10 MG/G
2 GEL TOPICAL 4 TIMES DAILY PRN
Qty: 100 G | Refills: 3 | Status: SHIPPED | OUTPATIENT
Start: 2021-12-14 | End: 2022-05-25

## 2021-12-17 ENCOUNTER — TELEPHONE (OUTPATIENT)
Dept: ADMINISTRATIVE | Facility: OTHER | Age: 36
End: 2021-12-17
Payer: COMMERCIAL

## 2022-02-09 ENCOUNTER — TELEPHONE (OUTPATIENT)
Dept: INTERNAL MEDICINE | Facility: CLINIC | Age: 37
End: 2022-02-09
Payer: COMMERCIAL

## 2022-02-09 RX ORDER — BENZONATATE 200 MG/1
200 CAPSULE ORAL 3 TIMES DAILY PRN
Qty: 30 CAPSULE | Refills: 0 | Status: SHIPPED | OUTPATIENT
Start: 2022-02-09 | End: 2022-02-19

## 2022-02-09 NOTE — TELEPHONE ENCOUNTER
I recommend that the patient get tested for COVID.  Secondarily, she should continue Mucinex DM as needed for cough.  I have prescribed Tessalon Perles to also assist with cough.  I recommend that she start over-the-counter Flonase nasal spray and over-the-counter Claritin to assist with congestion.  I recommend use of Tylenol and ibuprofen to assist with fever or pain.  If the patient's symptoms continue to persist or worsen, I recommend telemedicine visit urgent care visit further evaluation.  Please inform the patient.  Thank you.

## 2022-02-09 NOTE — TELEPHONE ENCOUNTER
----- Message from Jonathan Vu sent at 2/9/2022  1:32 PM CST -----  Contact: [phone] pt  Pt is requesting a RX for cold. Pt prefers a Z PACK.    [id number] - [place] - [address] [facility] [address]  [address]  [place] [address]  Phone: [phone] Fax: [phone]   Please Advise

## 2022-02-09 NOTE — TELEPHONE ENCOUNTER
Pt is having congestion sneezing coughing   Pt has been taking Musnex dm with very little relief  Pt would like to know what else she can do

## 2022-02-23 ENCOUNTER — TELEPHONE (OUTPATIENT)
Dept: ORTHOPEDICS | Facility: CLINIC | Age: 37
End: 2022-02-23
Payer: COMMERCIAL

## 2022-02-23 NOTE — TELEPHONE ENCOUNTER
Spoke with patient and scheduled patient per DANIELLE Nam. patient agreed to appointment date and time verbally.

## 2022-02-23 NOTE — TELEPHONE ENCOUNTER
----- Message from Aby Downing sent at 2/23/2022 11:20 AM CST -----  Contact: [phone]  Pt is calling to talk to the dr    Pt stated when she seen the dr last they were talking about cervical epidural.    Pt would like a call back to see if she needs to just schedule the appt for it.    [phone]

## 2022-02-24 ENCOUNTER — PATIENT MESSAGE (OUTPATIENT)
Dept: PAIN MEDICINE | Facility: CLINIC | Age: 37
End: 2022-02-24
Payer: COMMERCIAL

## 2022-02-25 ENCOUNTER — OFFICE VISIT (OUTPATIENT)
Dept: PAIN MEDICINE | Facility: CLINIC | Age: 37
End: 2022-02-25
Payer: COMMERCIAL

## 2022-02-25 ENCOUNTER — HOSPITAL ENCOUNTER (OUTPATIENT)
Dept: RADIOLOGY | Facility: HOSPITAL | Age: 37
Discharge: HOME | End: 2022-02-25
Attending: ANESTHESIOLOGY
Payer: COMMERCIAL

## 2022-02-25 VITALS
HEART RATE: 82 BPM | SYSTOLIC BLOOD PRESSURE: 105 MMHG | RESPIRATION RATE: 18 BRPM | OXYGEN SATURATION: 100 % | BODY MASS INDEX: 25.4 KG/M2 | DIASTOLIC BLOOD PRESSURE: 71 MMHG | TEMPERATURE: 98 F | HEIGHT: 62 IN | WEIGHT: 138 LBS

## 2022-02-25 DIAGNOSIS — M25.511 CHRONIC RIGHT SHOULDER PAIN: ICD-10-CM

## 2022-02-25 DIAGNOSIS — G89.29 CHRONIC RIGHT SHOULDER PAIN: ICD-10-CM

## 2022-02-25 DIAGNOSIS — M79.10 MYALGIA: ICD-10-CM

## 2022-02-25 DIAGNOSIS — M79.10 MYALGIA: Primary | ICD-10-CM

## 2022-02-25 PROCEDURE — 1160F PR REVIEW ALL MEDS BY PRESCRIBER/CLIN PHARMACIST DOCUMENTED: ICD-10-PCS | Mod: CPTII,S$GLB,, | Performed by: ANESTHESIOLOGY

## 2022-02-25 PROCEDURE — 3008F PR BODY MASS INDEX (BMI) DOCUMENTED: ICD-10-PCS | Mod: CPTII,S$GLB,, | Performed by: ANESTHESIOLOGY

## 2022-02-25 PROCEDURE — 99999 PR PBB SHADOW E&M-EST. PATIENT-LVL IV: ICD-10-PCS | Mod: PBBFAC,,, | Performed by: ANESTHESIOLOGY

## 2022-02-25 PROCEDURE — 3078F PR MOST RECENT DIASTOLIC BLOOD PRESSURE < 80 MM HG: ICD-10-PCS | Mod: CPTII,S$GLB,, | Performed by: ANESTHESIOLOGY

## 2022-02-25 PROCEDURE — 3074F PR MOST RECENT SYSTOLIC BLOOD PRESSURE < 130 MM HG: ICD-10-PCS | Mod: CPTII,S$GLB,, | Performed by: ANESTHESIOLOGY

## 2022-02-25 PROCEDURE — 1159F PR MEDICATION LIST DOCUMENTED IN MEDICAL RECORD: ICD-10-PCS | Mod: CPTII,S$GLB,, | Performed by: ANESTHESIOLOGY

## 2022-02-25 PROCEDURE — 99204 OFFICE O/P NEW MOD 45 MIN: CPT | Mod: S$GLB,,, | Performed by: ANESTHESIOLOGY

## 2022-02-25 PROCEDURE — 73030 X-RAY EXAM OF SHOULDER: CPT | Mod: 26,RT,, | Performed by: RADIOLOGY

## 2022-02-25 PROCEDURE — 3074F SYST BP LT 130 MM HG: CPT | Mod: CPTII,S$GLB,, | Performed by: ANESTHESIOLOGY

## 2022-02-25 PROCEDURE — 3008F BODY MASS INDEX DOCD: CPT | Mod: CPTII,S$GLB,, | Performed by: ANESTHESIOLOGY

## 2022-02-25 PROCEDURE — 73030 X-RAY EXAM OF SHOULDER: CPT | Mod: TC,RT

## 2022-02-25 PROCEDURE — 1160F RVW MEDS BY RX/DR IN RCRD: CPT | Mod: CPTII,S$GLB,, | Performed by: ANESTHESIOLOGY

## 2022-02-25 PROCEDURE — 3078F DIAST BP <80 MM HG: CPT | Mod: CPTII,S$GLB,, | Performed by: ANESTHESIOLOGY

## 2022-02-25 PROCEDURE — 73030 XR SHOULDER TRAUMA 3 VIEW RIGHT: ICD-10-PCS | Mod: 26,RT,, | Performed by: RADIOLOGY

## 2022-02-25 PROCEDURE — 99204 PR OFFICE/OUTPT VISIT, NEW, LEVL IV, 45-59 MIN: ICD-10-PCS | Mod: S$GLB,,, | Performed by: ANESTHESIOLOGY

## 2022-02-25 PROCEDURE — 99999 PR PBB SHADOW E&M-EST. PATIENT-LVL IV: CPT | Mod: PBBFAC,,, | Performed by: ANESTHESIOLOGY

## 2022-02-25 PROCEDURE — 1159F MED LIST DOCD IN RCRD: CPT | Mod: CPTII,S$GLB,, | Performed by: ANESTHESIOLOGY
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
## 2022-03-07 ENCOUNTER — CLINICAL SUPPORT (OUTPATIENT)
Dept: REHABILITATION | Facility: HOSPITAL | Age: 37
End: 2022-03-07
Payer: COMMERCIAL

## 2022-03-07 DIAGNOSIS — H51.11 CONVERGENCE INSUFFICIENCY: Primary | ICD-10-CM

## 2022-03-07 DIAGNOSIS — H53.9 VISION ABNORMALITIES: ICD-10-CM

## 2022-03-07 PROCEDURE — 97530 THERAPEUTIC ACTIVITIES: CPT | Mod: PO

## 2022-03-07 PROCEDURE — 97112 NEUROMUSCULAR REEDUCATION: CPT | Mod: PO
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
## 2022-03-28 ENCOUNTER — OFFICE VISIT (OUTPATIENT)
Dept: SPINE | Facility: CLINIC | Age: 37
End: 2022-03-28
Payer: COMMERCIAL

## 2022-03-28 VITALS
DIASTOLIC BLOOD PRESSURE: 70 MMHG | HEART RATE: 87 BPM | SYSTOLIC BLOOD PRESSURE: 103 MMHG | HEIGHT: 62 IN | WEIGHT: 138 LBS | BODY MASS INDEX: 25.4 KG/M2

## 2022-03-28 DIAGNOSIS — M79.10 MYALGIA: Primary | ICD-10-CM

## 2022-03-28 PROCEDURE — 3074F SYST BP LT 130 MM HG: CPT | Mod: CPTII,S$GLB,, | Performed by: ANESTHESIOLOGY

## 2022-03-28 PROCEDURE — 1160F RVW MEDS BY RX/DR IN RCRD: CPT | Mod: CPTII,S$GLB,, | Performed by: ANESTHESIOLOGY

## 2022-03-28 PROCEDURE — 3078F DIAST BP <80 MM HG: CPT | Mod: CPTII,S$GLB,, | Performed by: ANESTHESIOLOGY

## 2022-03-28 PROCEDURE — 3008F BODY MASS INDEX DOCD: CPT | Mod: CPTII,S$GLB,, | Performed by: ANESTHESIOLOGY

## 2022-03-28 PROCEDURE — 99213 OFFICE O/P EST LOW 20 MIN: CPT | Mod: S$GLB,,, | Performed by: ANESTHESIOLOGY

## 2022-03-28 PROCEDURE — 3008F PR BODY MASS INDEX (BMI) DOCUMENTED: ICD-10-PCS | Mod: CPTII,S$GLB,, | Performed by: ANESTHESIOLOGY

## 2022-03-28 PROCEDURE — 3078F PR MOST RECENT DIASTOLIC BLOOD PRESSURE < 80 MM HG: ICD-10-PCS | Mod: CPTII,S$GLB,, | Performed by: ANESTHESIOLOGY

## 2022-03-28 PROCEDURE — 3074F PR MOST RECENT SYSTOLIC BLOOD PRESSURE < 130 MM HG: ICD-10-PCS | Mod: CPTII,S$GLB,, | Performed by: ANESTHESIOLOGY

## 2022-03-28 PROCEDURE — 1160F PR REVIEW ALL MEDS BY PRESCRIBER/CLIN PHARMACIST DOCUMENTED: ICD-10-PCS | Mod: CPTII,S$GLB,, | Performed by: ANESTHESIOLOGY

## 2022-03-28 PROCEDURE — 1159F MED LIST DOCD IN RCRD: CPT | Mod: CPTII,S$GLB,, | Performed by: ANESTHESIOLOGY

## 2022-03-28 PROCEDURE — 99213 PR OFFICE/OUTPT VISIT, EST, LEVL III, 20-29 MIN: ICD-10-PCS | Mod: S$GLB,,, | Performed by: ANESTHESIOLOGY

## 2022-03-28 PROCEDURE — 1159F PR MEDICATION LIST DOCUMENTED IN MEDICAL RECORD: ICD-10-PCS | Mod: CPTII,S$GLB,, | Performed by: ANESTHESIOLOGY

## 2022-03-28 PROCEDURE — 99999 PR PBB SHADOW E&M-EST. PATIENT-LVL III: ICD-10-PCS | Mod: PBBFAC,,, | Performed by: ANESTHESIOLOGY

## 2022-03-28 PROCEDURE — 99999 PR PBB SHADOW E&M-EST. PATIENT-LVL III: CPT | Mod: PBBFAC,,, | Performed by: ANESTHESIOLOGY

## 2022-03-28 RX ORDER — METHOCARBAMOL 750 MG/1
750 TABLET, FILM COATED ORAL 2 TIMES DAILY PRN
Qty: 30 TABLET | Refills: 0 | Status: SHIPPED | OUTPATIENT
Start: 2022-03-28 | End: 2022-05-25
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
## 2022-04-13 ENCOUNTER — TELEPHONE (OUTPATIENT)
Dept: PAIN MEDICINE | Facility: CLINIC | Age: 37
End: 2022-04-13
Payer: COMMERCIAL

## 2022-04-13 DIAGNOSIS — M99.51 INTERVERTEBRAL DISC STENOSIS OF NEURAL CANAL OF CERVICAL REGION: Primary | ICD-10-CM

## 2022-04-13 NOTE — TELEPHONE ENCOUNTER
----- Message from Jena Silvestre MA sent at 4/13/2022  9:37 AM CDT -----  Regarding: pt requesting a call back  Name of Who is Calling: CARLOS ALBERTO MCKINLEY [[id number]]           What is the request in detail: pt requesting a call back in regards to scheduling surgery            Can the clinic reply by [facility]: n           [facility] [phone]

## 2022-04-14 ENCOUNTER — TELEPHONE (OUTPATIENT)
Dept: ADMINISTRATIVE | Facility: OTHER | Age: 37
End: 2022-04-14
Payer: COMMERCIAL

## 2022-04-14 ENCOUNTER — PATIENT MESSAGE (OUTPATIENT)
Dept: PAIN MEDICINE | Facility: OTHER | Age: 37
End: 2022-04-14
Payer: COMMERCIAL

## 2022-05-02 ENCOUNTER — PATIENT MESSAGE (OUTPATIENT)
Dept: PAIN MEDICINE | Facility: OTHER | Age: 37
End: 2022-05-02
Payer: COMMERCIAL

## 2022-05-03 ENCOUNTER — HOSPITAL ENCOUNTER (OUTPATIENT)
Facility: OTHER | Age: 37
Discharge: HOME | End: 2022-05-03
Attending: ANESTHESIOLOGY | Admitting: ANESTHESIOLOGY
Payer: COMMERCIAL

## 2022-05-03 VITALS
SYSTOLIC BLOOD PRESSURE: 107 MMHG | OXYGEN SATURATION: 100 % | TEMPERATURE: 99 F | RESPIRATION RATE: 14 BRPM | BODY MASS INDEX: 25.4 KG/M2 | DIASTOLIC BLOOD PRESSURE: 55 MMHG | HEART RATE: 53 BPM | WEIGHT: 138 LBS | HEIGHT: 62 IN

## 2022-05-03 DIAGNOSIS — M54.12 CERVICAL RADICULOPATHY: ICD-10-CM

## 2022-05-03 DIAGNOSIS — G89.29 CHRONIC PAIN: ICD-10-CM

## 2022-05-03 DIAGNOSIS — M50.30 DDD (DEGENERATIVE DISC DISEASE), CERVICAL: Primary | ICD-10-CM

## 2022-05-03 PROCEDURE — 25000003 PHARM REV CODE 250: Performed by: STUDENT IN AN ORGANIZED HEALTH CARE EDUCATION/TRAINING PROGRAM

## 2022-05-03 PROCEDURE — 62321 PR INJ CERV/THORAC, W/GUIDANCE: ICD-10-PCS | Mod: ,,, | Performed by: ANESTHESIOLOGY

## 2022-05-03 PROCEDURE — 25500020 PHARM REV CODE 255: Performed by: ANESTHESIOLOGY

## 2022-05-03 PROCEDURE — 62321 NJX INTERLAMINAR CRV/THRC: CPT | Performed by: ANESTHESIOLOGY

## 2022-05-03 PROCEDURE — 62321 NJX INTERLAMINAR CRV/THRC: CPT | Mod: ,,, | Performed by: ANESTHESIOLOGY

## 2022-05-03 PROCEDURE — 63600175 PHARM REV CODE 636 W HCPCS: Performed by: ANESTHESIOLOGY

## 2022-05-03 PROCEDURE — 25000003 PHARM REV CODE 250: Performed by: ANESTHESIOLOGY

## 2022-05-03 RX ORDER — MIDAZOLAM HYDROCHLORIDE 1 MG/ML
INJECTION INTRAMUSCULAR; INTRAVENOUS
Status: DISCONTINUED | OUTPATIENT
Start: 2022-05-03 | End: 2022-05-03 | Stop reason: HOSPADM

## 2022-05-03 RX ORDER — LIDOCAINE HYDROCHLORIDE 10 MG/ML
INJECTION, SOLUTION EPIDURAL; INFILTRATION; INTRACAUDAL; PERINEURAL
Status: DISCONTINUED | OUTPATIENT
Start: 2022-05-03 | End: 2022-05-03 | Stop reason: HOSPADM

## 2022-05-03 RX ORDER — DEXAMETHASONE SODIUM PHOSPHATE 10 MG/ML
INJECTION INTRAMUSCULAR; INTRAVENOUS
Status: DISCONTINUED | OUTPATIENT
Start: 2022-05-03 | End: 2022-05-03 | Stop reason: HOSPADM

## 2022-05-03 RX ORDER — LIDOCAINE HYDROCHLORIDE 20 MG/ML
INJECTION, SOLUTION INFILTRATION; PERINEURAL
Status: DISCONTINUED | OUTPATIENT
Start: 2022-05-03 | End: 2022-05-03 | Stop reason: HOSPADM

## 2022-05-03 RX ORDER — FENTANYL CITRATE 50 UG/ML
INJECTION, SOLUTION INTRAMUSCULAR; INTRAVENOUS
Status: DISCONTINUED | OUTPATIENT
Start: 2022-05-03 | End: 2022-05-03 | Stop reason: HOSPADM

## 2022-05-03 RX ORDER — SODIUM CHLORIDE 9 MG/ML
500 INJECTION, SOLUTION INTRAVENOUS CONTINUOUS
Status: DISCONTINUED | OUTPATIENT
Start: 2022-05-03 | End: 2022-05-03 | Stop reason: HOSPADM

## 2022-05-03 NOTE — DISCHARGE SUMMARY
[facility]  Discharge Note  Short Stay    Procedure(s) (LRB):  INJECTION, STEROID, EPIDURAL, C7-T1 (N/A)    OUTCOME: Patient tolerated treatment/procedure well without complication and is now ready for discharge.    DISPOSITION: Home or Self Care    FINAL DIAGNOSIS: cervical radiculopathy    FOLLOWUP: In clinic    DISCHARGE INSTRUCTIONS:  No discharge procedures on file.     TIME SPENT ON DISCHARGE: 2 minutes

## 2022-05-03 NOTE — H&P
HPI  37 yo F presenting for a C7-T1 LEIGH.        Past Medical History:   Diagnosis Date    Concussion     H/O concussion     History of urinary calculi     Kidney infection     kidney stones aand kidney infection 2012/and 13    Kidney stones      Past Surgical History:   Procedure Laterality Date    BREAST AUGMENTATION  2011     SECTION  2004    Boy     SECTION  2010    Boy     SECTION  2015    Boy     SECTION  2018    Girl    CHOLECYSTECTOMY  2012    CYSTOSCOPY W/ RETROGRADES Bilateral 2019    Procedure: CYSTOSCOPY, WITH RETROGRADE PYELOGRAM;  Surgeon: Vipul Sharma MD;  Location: [facility] OR [address] [place] [address];  Service: Urology;  Laterality: Bilateral;    DILATION AND CURETTAGE OF UTERUS      TAB    URETEROSCOPY Right 2019    Procedure: URETEROSCOPY;  Surgeon: Vipul Sharma MD;  Location: [facility] OR [address] [place] [address];  Service: Urology;  Laterality: Right;  1hr     Review of patient's allergies indicates:   Allergen Reactions    Lactose         PMHx, PSHx, Allergies, Medications reviewed in epic      ROS negative except pain complaints in HPI    OBJECTIVE:    There were no vitals taken for this visit.    PHYSICAL EXAMINATION:    GENERAL: Well appearing, in no acute distress, alert and oriented x3.  PSYCH:  Mood and affect appropriate.  SKIN: Skin color, texture, turgor normal, no rashes or lesions.  CV: RRR with palpation of the radial artery.  PULM: No evidence of respiratory difficulty, symmetric chest rise. Clear to auscultation.  NEURO: Cranial nerves grossly intact.    Plan:    Proceed with procedure as planned    Harvey Mohr  2022

## 2022-05-03 NOTE — OP NOTE
Cervical Interlaminar Epidural Steroid Injection under Fluoroscopic Guidance    The procedure, risks, benefits, and options were discussed with the patient. There are no contraindications to the procedure. The patent expressed understanding and agreed to the procedure. Informed written consent was obtained prior to the start of the procedure and can be found in the patient's chart.     PATIENT NAME: Hillary Fountain   MRN: [id number]     DATE OF PROCEDURE: 05/03/2022    PROCEDURE: Cervical Interlaminar Epidural Steroid Injection C7/T1 under Fluoroscopic Guidance    PRE-OP DIAGNOSIS: Intervertebral disc stenosis of neural canal of cervical region [M99.51] Cervical radiculopathy [M54.12]    POST-OP DIAGNOSIS: Same    PHYSICIAN: Sharon Gale MD     MEDICATIONS INJECTED: Preservative-free Decadron 10mg with 1cc of Lidocaine 1% MPF and preservative free normal saline    LOCAL ANESTHETIC INJECTED: Xylocaine 2%     Anxiolysis: Versed 1mg and 50mcg of Fentanyl    ESTIMATED BLOOD LOSS: None    COMPLICATIONS: None    TECHNIQUE: Time-out was performed to identify the patient and procedure to be performed. With the patient laying in a prone position, the surgical area was prepped and draped in the usual sterile fashion using ChloraPrep and a fenestrated drape. The level was determined under fluoroscopy guidance. Skin anesthesia was achieved by injecting Lidocaine 2% over the injection site.  The interlaminar space was then approached with a 20 gauge, 3.5 inch Tuohy needle that was introduced under fluoroscopic guidance with AP, lateral and/or contralateral oblique imaging. Once the Ligamentum flavum was encountered loss of resistance to air was used to enter the epidural space. With positive loss of resistance and negative aspiration for CSF or Blood, contrast dye Omnipaque (300mg/ml) was injected to confirm placement and there was no vascular runoff. Then 2 mL of the medication mixture listed above was then injected slowly.  Displacement of the radio opaque contrast after injection of the medication confirmed that the medication went into the area of the epidural space. The needles were removed, and bleeding was nil. A sterile dressing was applied. No specimens collected. The patient tolerated the procedure well.       The patient was monitored after the procedure in the recovery area. They were given post-procedure and discharge instructions to follow at home. The patient was discharged in a stable condition.        Sharon Gale MD

## 2022-05-03 NOTE — DISCHARGE INSTRUCTIONS

## 2022-05-24 ENCOUNTER — TELEPHONE (OUTPATIENT)
Dept: PAIN MEDICINE | Facility: CLINIC | Age: 37
End: 2022-05-24
Payer: COMMERCIAL

## 2022-05-24 NOTE — TELEPHONE ENCOUNTER
Good Afternoon ,      This is an appointment reminder about your schedule Virtual Visit with Pain Management Provider Sharon Gale MD.   Your appointment is for May 25, 2022 at 11:30 am.     Please log into your [facility] 15 min's prior to your appointment time to e-precheck, verify all corresponding pages, and troubleshoot any problems that may occur. Once your device has been verify as compatible, and you receive the green check,  you must hit/ select the start visit button, This will notify your provider that you are ready to start the Virtual Video Visit.     As [facility] is a teaching [facility], your visit may be started with a resident or a fellow that is rounding in clinic, then proceeded by your physician.    Once logged on, please allow the provider 20 -30 mins to check-in, in case running behind.       If you experience any technical issue please contact [phone]        Thank You for entrusting [facility] to handle your care     Staff confirmed SG

## 2022-05-25 ENCOUNTER — OFFICE VISIT (OUTPATIENT)
Dept: PAIN MEDICINE | Facility: CLINIC | Age: 37
End: 2022-05-25
Payer: COMMERCIAL

## 2022-05-25 DIAGNOSIS — M54.12 CERVICAL RADICULOPATHY: Primary | ICD-10-CM

## 2022-05-25 PROCEDURE — 99213 PR OFFICE/OUTPT VISIT, EST, LEVL III, 20-29 MIN: ICD-10-PCS | Mod: 95,,, | Performed by: ANESTHESIOLOGY

## 2022-05-25 PROCEDURE — 99213 OFFICE O/P EST LOW 20 MIN: CPT | Mod: 95,,, | Performed by: ANESTHESIOLOGY
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
## 2022-05-31 ENCOUNTER — OFFICE VISIT (OUTPATIENT)
Dept: NEUROLOGY | Facility: CLINIC | Age: 37
End: 2022-05-31
Payer: COMMERCIAL

## 2022-05-31 DIAGNOSIS — Z87.820 H/O CONCUSSION: ICD-10-CM

## 2022-05-31 DIAGNOSIS — M50.122 CERVICAL DISC DISORDER AT C5-C6 LEVEL WITH RADICULOPATHY: Primary | ICD-10-CM

## 2022-05-31 DIAGNOSIS — M25.511 CHRONIC RIGHT SHOULDER PAIN: ICD-10-CM
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
## 2022-05-31 DIAGNOSIS — H53.9 VISUAL CHANGES: ICD-10-CM

## 2022-05-31 DIAGNOSIS — M50.30 DDD (DEGENERATIVE DISC DISEASE), CERVICAL: ICD-10-CM

## 2022-05-31 DIAGNOSIS — G89.29 CHRONIC RIGHT SHOULDER PAIN: ICD-10-CM

## 2022-05-31 PROCEDURE — 1159F PR MEDICATION LIST DOCUMENTED IN MEDICAL RECORD: ICD-10-PCS | Mod: CPTII,95,, | Performed by: PSYCHIATRY & NEUROLOGY

## 2022-05-31 PROCEDURE — 1159F MED LIST DOCD IN RCRD: CPT | Mod: CPTII,95,, | Performed by: PSYCHIATRY & NEUROLOGY

## 2022-05-31 PROCEDURE — 1160F RVW MEDS BY RX/DR IN RCRD: CPT | Mod: CPTII,95,, | Performed by: PSYCHIATRY & NEUROLOGY

## 2022-05-31 PROCEDURE — 99214 OFFICE O/P EST MOD 30 MIN: CPT | Mod: 95,,, | Performed by: PSYCHIATRY & NEUROLOGY

## 2022-05-31 PROCEDURE — 1160F PR REVIEW ALL MEDS BY PRESCRIBER/CLIN PHARMACIST DOCUMENTED: ICD-10-PCS | Mod: CPTII,95,, | Performed by: PSYCHIATRY & NEUROLOGY

## 2022-05-31 PROCEDURE — 99214 PR OFFICE/OUTPT VISIT, EST, LEVL IV, 30-39 MIN: ICD-10-PCS | Mod: 95,,, | Performed by: PSYCHIATRY & NEUROLOGY
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
## 2022-07-25 ENCOUNTER — OFFICE VISIT (OUTPATIENT)
Dept: SPORTS MEDICINE | Facility: CLINIC | Age: 37
End: 2022-07-25
Payer: COMMERCIAL

## 2022-07-25 ENCOUNTER — HOSPITAL ENCOUNTER (OUTPATIENT)
Dept: RADIOLOGY | Facility: HOSPITAL | Age: 37
Discharge: HOME | End: 2022-07-25
Attending: PHYSICIAN ASSISTANT
Payer: COMMERCIAL

## 2022-07-25 VITALS
SYSTOLIC BLOOD PRESSURE: 117 MMHG | WEIGHT: 136.19 LBS | HEIGHT: 62 IN | DIASTOLIC BLOOD PRESSURE: 78 MMHG | BODY MASS INDEX: 25.06 KG/M2 | HEART RATE: 66 BPM

## 2022-07-25 DIAGNOSIS — G89.29 CHRONIC NECK PAIN: ICD-10-CM

## 2022-07-25 DIAGNOSIS — M25.511 CHRONIC RIGHT SHOULDER PAIN: Primary | ICD-10-CM

## 2022-07-25 DIAGNOSIS — G89.29 CHRONIC RIGHT SHOULDER PAIN: Primary | ICD-10-CM

## 2022-07-25 DIAGNOSIS — M25.511 RIGHT SHOULDER PAIN, UNSPECIFIED CHRONICITY: ICD-10-CM

## 2022-07-25 DIAGNOSIS — M54.2 CHRONIC NECK PAIN: ICD-10-CM

## 2022-07-25 DIAGNOSIS — M54.2 CERVICALGIA: ICD-10-CM

## 2022-07-25 DIAGNOSIS — M54.10 RADICULOPATHY AFFECTING UPPER EXTREMITY: ICD-10-CM

## 2022-07-25 PROCEDURE — 99204 OFFICE O/P NEW MOD 45 MIN: CPT | Mod: S$GLB,,, | Performed by: PHYSICIAN ASSISTANT

## 2022-07-25 PROCEDURE — 3074F PR MOST RECENT SYSTOLIC BLOOD PRESSURE < 130 MM HG: ICD-10-PCS | Mod: CPTII,S$GLB,, | Performed by: PHYSICIAN ASSISTANT

## 2022-07-25 PROCEDURE — 3008F PR BODY MASS INDEX (BMI) DOCUMENTED: ICD-10-PCS | Mod: CPTII,S$GLB,, | Performed by: PHYSICIAN ASSISTANT

## 2022-07-25 PROCEDURE — 73030 X-RAY EXAM OF SHOULDER: CPT | Mod: TC,RT

## 2022-07-25 PROCEDURE — 3078F DIAST BP <80 MM HG: CPT | Mod: CPTII,S$GLB,, | Performed by: PHYSICIAN ASSISTANT

## 2022-07-25 PROCEDURE — 1159F PR MEDICATION LIST DOCUMENTED IN MEDICAL RECORD: ICD-10-PCS | Mod: CPTII,S$GLB,, | Performed by: PHYSICIAN ASSISTANT

## 2022-07-25 PROCEDURE — 1160F PR REVIEW ALL MEDS BY PRESCRIBER/CLIN PHARMACIST DOCUMENTED: ICD-10-PCS | Mod: CPTII,S$GLB,, | Performed by: PHYSICIAN ASSISTANT

## 2022-07-25 PROCEDURE — 73030 XR SHOULDER COMPLETE 2 OR MORE VIEWS RIGHT: ICD-10-PCS | Mod: 26,RT,, | Performed by: RADIOLOGY

## 2022-07-25 PROCEDURE — 99204 PR OFFICE/OUTPT VISIT, NEW, LEVL IV, 45-59 MIN: ICD-10-PCS | Mod: S$GLB,,, | Performed by: PHYSICIAN ASSISTANT

## 2022-07-25 PROCEDURE — 99999 PR PBB SHADOW E&M-EST. PATIENT-LVL III: CPT | Mod: PBBFAC,,, | Performed by: PHYSICIAN ASSISTANT

## 2022-07-25 PROCEDURE — 1160F RVW MEDS BY RX/DR IN RCRD: CPT | Mod: CPTII,S$GLB,, | Performed by: PHYSICIAN ASSISTANT

## 2022-07-25 PROCEDURE — 1159F MED LIST DOCD IN RCRD: CPT | Mod: CPTII,S$GLB,, | Performed by: PHYSICIAN ASSISTANT

## 2022-07-25 PROCEDURE — 3074F SYST BP LT 130 MM HG: CPT | Mod: CPTII,S$GLB,, | Performed by: PHYSICIAN ASSISTANT

## 2022-07-25 PROCEDURE — 73030 X-RAY EXAM OF SHOULDER: CPT | Mod: 26,RT,, | Performed by: RADIOLOGY

## 2022-07-25 PROCEDURE — 3008F BODY MASS INDEX DOCD: CPT | Mod: CPTII,S$GLB,, | Performed by: PHYSICIAN ASSISTANT

## 2022-07-25 PROCEDURE — 99999 PR PBB SHADOW E&M-EST. PATIENT-LVL III: ICD-10-PCS | Mod: PBBFAC,,, | Performed by: PHYSICIAN ASSISTANT

## 2022-07-25 PROCEDURE — 3078F PR MOST RECENT DIASTOLIC BLOOD PRESSURE < 80 MM HG: ICD-10-PCS | Mod: CPTII,S$GLB,, | Performed by: PHYSICIAN ASSISTANT
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
## 2022-08-05 ENCOUNTER — OFFICE VISIT (OUTPATIENT)
Dept: ORTHOPEDICS | Facility: CLINIC | Age: 37
End: 2022-08-05
Payer: COMMERCIAL

## 2022-08-05 VITALS — HEIGHT: 62 IN | WEIGHT: 136.25 LBS | BODY MASS INDEX: 25.07 KG/M2

## 2022-08-05 DIAGNOSIS — M25.511 CHRONIC RIGHT SHOULDER PAIN: ICD-10-CM

## 2022-08-05 DIAGNOSIS — G89.29 CHRONIC RIGHT SHOULDER PAIN: ICD-10-CM

## 2022-08-05 DIAGNOSIS — M54.12 CERVICAL RADICULOPATHY: Primary | ICD-10-CM

## 2022-08-05 PROCEDURE — 99213 OFFICE O/P EST LOW 20 MIN: CPT | Mod: S$GLB,,, | Performed by: ORTHOPAEDIC SURGERY

## 2022-08-05 PROCEDURE — 99999 PR PBB SHADOW E&M-EST. PATIENT-LVL II: CPT | Mod: PBBFAC,,, | Performed by: ORTHOPAEDIC SURGERY

## 2022-08-05 PROCEDURE — 3008F PR BODY MASS INDEX (BMI) DOCUMENTED: ICD-10-PCS | Mod: CPTII,S$GLB,, | Performed by: ORTHOPAEDIC SURGERY

## 2022-08-05 PROCEDURE — 3008F BODY MASS INDEX DOCD: CPT | Mod: CPTII,S$GLB,, | Performed by: ORTHOPAEDIC SURGERY

## 2022-08-05 PROCEDURE — 99213 PR OFFICE/OUTPT VISIT, EST, LEVL III, 20-29 MIN: ICD-10-PCS | Mod: S$GLB,,, | Performed by: ORTHOPAEDIC SURGERY

## 2022-08-05 PROCEDURE — 1159F PR MEDICATION LIST DOCUMENTED IN MEDICAL RECORD: ICD-10-PCS | Mod: CPTII,S$GLB,, | Performed by: ORTHOPAEDIC SURGERY

## 2022-08-05 PROCEDURE — 99999 PR PBB SHADOW E&M-EST. PATIENT-LVL II: ICD-10-PCS | Mod: PBBFAC,,, | Performed by: ORTHOPAEDIC SURGERY

## 2022-08-05 PROCEDURE — 1159F MED LIST DOCD IN RCRD: CPT | Mod: CPTII,S$GLB,, | Performed by: ORTHOPAEDIC SURGERY

## 2022-08-05 RX ORDER — GABAPENTIN 100 MG/1
100 CAPSULE ORAL 3 TIMES DAILY
Qty: 90 CAPSULE | Refills: 11 | Status: SHIPPED | OUTPATIENT
Start: 2022-08-05 | End: 2023-01-26 | Stop reason: ALTCHOICE

## 2022-08-22 ENCOUNTER — OFFICE VISIT (OUTPATIENT)
Dept: ORTHOPEDICS | Facility: CLINIC | Age: 37
End: 2022-08-22
Payer: COMMERCIAL

## 2022-08-22 ENCOUNTER — PATIENT MESSAGE (OUTPATIENT)
Dept: ORTHOPEDICS | Facility: CLINIC | Age: 37
End: 2022-08-22

## 2022-08-22 VITALS — HEIGHT: 62 IN | BODY MASS INDEX: 25.07 KG/M2 | WEIGHT: 136.25 LBS

## 2022-08-22 DIAGNOSIS — M54.12 CERVICAL RADICULOPATHY: Primary | ICD-10-CM

## 2022-08-22 PROCEDURE — 99499 NO LOS: ICD-10-PCS | Mod: 95,,, | Performed by: ORTHOPAEDIC SURGERY

## 2022-08-22 PROCEDURE — 99499 UNLISTED E&M SERVICE: CPT | Mod: 95,,, | Performed by: ORTHOPAEDIC SURGERY

## 2022-08-22 PROCEDURE — 3008F PR BODY MASS INDEX (BMI) DOCUMENTED: ICD-10-PCS | Mod: CPTII,95,, | Performed by: ORTHOPAEDIC SURGERY

## 2022-08-22 PROCEDURE — 1159F MED LIST DOCD IN RCRD: CPT | Mod: CPTII,95,, | Performed by: ORTHOPAEDIC SURGERY

## 2022-08-22 PROCEDURE — 3008F BODY MASS INDEX DOCD: CPT | Mod: CPTII,95,, | Performed by: ORTHOPAEDIC SURGERY

## 2022-08-22 PROCEDURE — 1159F PR MEDICATION LIST DOCUMENTED IN MEDICAL RECORD: ICD-10-PCS | Mod: CPTII,95,, | Performed by: ORTHOPAEDIC SURGERY

## 2022-08-26 ENCOUNTER — HOSPITAL ENCOUNTER (OUTPATIENT)
Dept: RADIOLOGY | Facility: OTHER | Age: 37
Discharge: HOME | End: 2022-08-26
Attending: PHYSICIAN ASSISTANT
Payer: COMMERCIAL

## 2022-08-26 DIAGNOSIS — M25.511 CHRONIC RIGHT SHOULDER PAIN: ICD-10-CM

## 2022-08-26 DIAGNOSIS — M54.2 CERVICALGIA: ICD-10-CM

## 2022-08-26 DIAGNOSIS — G89.29 CHRONIC RIGHT SHOULDER PAIN: ICD-10-CM

## 2022-08-26 PROCEDURE — 73221 MRI JOINT UPR EXTREM W/O DYE: CPT | Mod: 26,RT,, | Performed by: RADIOLOGY

## 2022-08-26 PROCEDURE — 72141 MRI NECK SPINE W/O DYE: CPT | Mod: 26,,, | Performed by: RADIOLOGY

## 2022-08-26 PROCEDURE — 73221 MRI JOINT UPR EXTREM W/O DYE: CPT | Mod: TC,RT

## 2022-08-26 PROCEDURE — 72141 MRI CERVICAL SPINE WITHOUT CONTRAST: ICD-10-PCS | Mod: 26,,, | Performed by: RADIOLOGY

## 2022-08-26 PROCEDURE — 72141 MRI NECK SPINE W/O DYE: CPT | Mod: TC

## 2022-08-26 PROCEDURE — 73221 MRI SHOULDER WITHOUT CONTRAST RIGHT: ICD-10-PCS | Mod: 26,RT,, | Performed by: RADIOLOGY

## 2022-08-29 ENCOUNTER — TELEPHONE (OUTPATIENT)
Dept: ORTHOPEDICS | Facility: CLINIC | Age: 37
End: 2022-08-29
Payer: COMMERCIAL

## 2022-08-29 NOTE — TELEPHONE ENCOUNTER
----- Message from Chintan Yanes sent at 8/29/2022  8:27 AM CDT -----  Regarding: PT CALLING IN REFERENCE TO PHONE CALL FOR RESULT  Contact: pt  Pt's requesting a call back regarding phone call at 8.. For results         Confirmed contact info below:  Contact Name: Hillary Fountain  Phone Number: [phone]

## 2022-08-29 NOTE — TELEPHONE ENCOUNTER
----- Message from Miladys Nam PA-C sent at 8/29/2022  8:40 AM CDT -----  Regarding: FW: PT CALLING IN REFERENCE TO PHONE CALL FOR RESULT  Contact: pt  Can you let her know I will call her by the end of the day with her results and see if she has a time preference   ----- Message -----  From: Chintan Yanes  Sent: 8/29/2022   8:32 AM CDT  To: Cyril Hook Staff  Subject: PT CALLING IN REFERENCE TO PHONE CALL FOR RE#    Pt's requesting a call back regarding phone call at 8.. For results         Confirmed contact info below:  Contact Name: Hillary Fountain  Phone Number: [phone]

## 2022-08-29 NOTE — TELEPHONE ENCOUNTER
Spoke with pt over the phone. She continues to have neck, right arm, and right shoulder pain with numbness and tingling. New cervical MRI similar to MRI in 2021. Mild degenerative changes without significant central stenosis. Pt reports 75% pain relief with LEIGH in May 2022 for 2 months. Offered repeat LEIGH, pt would like to speak to Chano Robledo PA-C in Sports Medicine regarding right shoulder MRI first.

## 2022-08-31 ENCOUNTER — TELEPHONE (OUTPATIENT)
Dept: SPORTS MEDICINE | Facility: CLINIC | Age: 37
End: 2022-08-31
Payer: COMMERCIAL

## 2022-08-31 DIAGNOSIS — M54.2 CHRONIC NECK PAIN: ICD-10-CM

## 2022-08-31 DIAGNOSIS — M54.10 RADICULOPATHY AFFECTING UPPER EXTREMITY: ICD-10-CM

## 2022-08-31 DIAGNOSIS — G89.29 CHRONIC RIGHT SHOULDER PAIN: Primary | ICD-10-CM

## 2022-08-31 DIAGNOSIS — M25.511 CHRONIC RIGHT SHOULDER PAIN: Primary | ICD-10-CM

## 2022-08-31 DIAGNOSIS — G89.29 CHRONIC NECK PAIN: ICD-10-CM

## 2022-08-31 NOTE — TELEPHONE ENCOUNTER
I called and spoke to patient about her right shoulder and cervical spine MRI after discussed with Miladys Nam PA-C of back and spine clinic.     MRIs do not show any significant pathology to correlate her symptoms of cervical pain, shoulder pain, and RUE paresthesias with color change of her hand. Will order and EMG to evaluate neurologic pathology of the RUE and refer her to CHANEL[facility] Isaac of Vascular surgery to rule out thoracic outlet syndrome.     All patients questions were answered. Patient was advised to call us with any concerns or questions.

## 2022-09-01 ENCOUNTER — TELEPHONE (OUTPATIENT)
Dept: SPORTS MEDICINE | Facility: CLINIC | Age: 37
End: 2022-09-01
Payer: COMMERCIAL

## 2022-09-01 NOTE — TELEPHONE ENCOUNTER
----- Message from Mariam Valentin MA sent at 8/31/2022  4:58 PM CDT -----  Regarding: FW: call patient    ----- Message -----  From: Samir Robledo III, PA-C  Sent: 8/31/2022   4:14 PM CDT  To: Venkat Dawson Staff  Subject: call patient                                     Call patient to schedule EMG of RUE and let her know that I have placed a referral to [facility] Isaac of Vascular surgery to evaluate her for thoracic outlet syndrome. They should be calling her to schedule and appointment.

## 2022-09-01 NOTE — TELEPHONE ENCOUNTER
Called patient to inform her of message below. RADHA    Also sent message to Columba Lopez- Dr. Abebe's MA regarding EMG scheduling.

## 2022-11-14 ENCOUNTER — OFFICE VISIT (OUTPATIENT)
Dept: ORTHOPEDICS | Facility: CLINIC | Age: 37
End: 2022-11-14
Payer: COMMERCIAL

## 2022-11-14 VITALS — BODY MASS INDEX: 24.5 KG/M2 | HEIGHT: 62 IN | WEIGHT: 133.13 LBS

## 2022-11-14 DIAGNOSIS — G89.29 CHRONIC RIGHT SHOULDER PAIN: Primary | ICD-10-CM

## 2022-11-14 DIAGNOSIS — M25.511 CHRONIC RIGHT SHOULDER PAIN: Primary | ICD-10-CM

## 2022-11-14 PROCEDURE — 99999 PR PBB SHADOW E&M-EST. PATIENT-LVL III: ICD-10-PCS | Mod: PBBFAC,,, | Performed by: ORTHOPAEDIC SURGERY

## 2022-11-14 PROCEDURE — 99999 PR PBB SHADOW E&M-EST. PATIENT-LVL III: CPT | Mod: PBBFAC,,, | Performed by: ORTHOPAEDIC SURGERY

## 2022-11-14 PROCEDURE — 3008F PR BODY MASS INDEX (BMI) DOCUMENTED: ICD-10-PCS | Mod: CPTII,S$GLB,, | Performed by: ORTHOPAEDIC SURGERY

## 2022-11-14 PROCEDURE — 99213 PR OFFICE/OUTPT VISIT, EST, LEVL III, 20-29 MIN: ICD-10-PCS | Mod: S$GLB,,, | Performed by: ORTHOPAEDIC SURGERY

## 2022-11-14 PROCEDURE — 3008F BODY MASS INDEX DOCD: CPT | Mod: CPTII,S$GLB,, | Performed by: ORTHOPAEDIC SURGERY

## 2022-11-14 PROCEDURE — 1159F PR MEDICATION LIST DOCUMENTED IN MEDICAL RECORD: ICD-10-PCS | Mod: CPTII,S$GLB,, | Performed by: ORTHOPAEDIC SURGERY

## 2022-11-14 PROCEDURE — 1159F MED LIST DOCD IN RCRD: CPT | Mod: CPTII,S$GLB,, | Performed by: ORTHOPAEDIC SURGERY

## 2022-11-14 PROCEDURE — 99213 OFFICE O/P EST LOW 20 MIN: CPT | Mod: S$GLB,,, | Performed by: ORTHOPAEDIC SURGERY

## 2022-11-14 RX ORDER — IBUPROFEN 800 MG/1
800 TABLET ORAL 3 TIMES DAILY
Qty: 60 TABLET | Refills: 0 | Status: SHIPPED | OUTPATIENT
Start: 2022-11-14 | End: 2024-01-08 | Stop reason: ALTCHOICE

## 2022-11-14 NOTE — LETTER
November 14, 2022      [id number]  [address]  [facility] [address]  Phone: [phone]       Patient: Hillary Fountain   YOB: 1985  Date of Visit: 11/14/2022    To Whom It May Concern:    Devora Fountain  was at [facility] on 11/14/2022. She is unable to exercise secondary to chronic neck and right shoulder pain that is still being worked up. Please allow her to pause her gym membership indefinitely. If you have any questions or concerns, or if I can be of further assistance, please do not hesitate to contact me.    Sincerely,    Miladys Nam PA-C

## 2022-11-14 NOTE — PROGRESS NOTES
DATE: 11/14/2022  PATIENT: Hillary Fountain    Attending Physician: Vaughn Wilson MD    HISTORY:  Hillary Fountain is a 37 y.o. female who returns to me today for follow up.  She was last seen by me 8/22/2022.  Today she is doing well but notes she continues to have pain in her right shoulder joint and behind her right shoulder blade. Since our last visit an EMG was ordered by Chano Robledo PA-C in Sports Medicine to evaluate for possible thoracic outlet syndrome.     PREVIOUS INTERVENTIONS    Right shoulder CSI 12/1/21  Cervical LEIGH  5/3/22    The Patient denies myelopathic symptoms such as handwriting changes or difficulty with buttons/coins/keys. Denies perineal paresthesias, bowel/bladder dysfunction.    PMH/PSH/FamHx/SocHx:  Unchanged from prior visit    ROS:  REVIEW OF SYSTEMS:  Constitution: Negative. Negative for chills, fever and night sweats.   HENT: Negative for congestion and headaches.    Eyes: Negative for blurred vision, left vision loss and right vision loss.   Cardiovascular: Negative for chest pain and syncope.   Respiratory: Negative for cough and shortness of breath.    Endocrine: Negative for polydipsia, polyphagia and polyuria.   Hematologic/Lymphatic: Negative for bleeding problem. Does not bruise/bleed easily.   Skin: Negative for dry skin, itching and rash.   Musculoskeletal: Negative for falls and muscle weakness.   Gastrointestinal: Negative for abdominal pain and bowel incontinence.   Allergic/Immunologic: Negative for hives and persistent infections.  Genitourinary: Negative for urinary retention/incontinence and nocturia.   Neurological: negative for disturbances in coordination, no myelopathic symptoms such as handwriting changes or difficulty with buttons, coins, keys or small objects. No loss of balance and seizures.   Psychiatric/Behavioral: Negative for depression. The patient does not have insomnia.   Denies myelopathic symptoms, perineal paresthesias, bowel or bladder  incontinence    EXAM:  There were no vitals taken for this visit.    My physical examination was notable for the following findings:     Musculoskeletal and neuro exam stable.     IMAGING:  No new imaging today.    Today I personally re-reviewed AP, Lat and Flex/Ex  upright C-spine films that demonstrate disc spaces relatively well maintained.     MRI cervical demonstrates C3-C4 degenerative disc disease and mild left uncovertebral joint spurring contributing to mild left neural foraminal narrowing.  No spinal canal stenosis. Mild degenerative disc disease at C4-C5 and C5-C6 without associated spinal canal stenosis or neural foraminal narrowing.    MRI right shoulder demonstrates subscapularis tendinosis with a small, partial-thickness interstitial tear and signal heterogeneity of the posterior-superior labrum.    There is no height or weight on file to calculate BMI.    No results found for: HGBA1C      ASSESSMENT/PLAN:    There are no diagnoses linked to this encounter.    Today we discussed at length all of the different treatment options including anti-inflammatories, acetaminophen, rest, ice, heat, physical therapy including strengthening and stretching exercises, home exercises, ROM, aerobic conditioning, aqua therapy, other modalities including ultrasound, massage, and dry needling, epidural steroid injections and finally surgical intervention.      Pt presents with chronic right shoulder pain. Will proceed with EMG on 12/7/22 and schedule with vascular surgery to evaluate for possible TOS.

## 2022-11-16 ENCOUNTER — PATIENT MESSAGE (OUTPATIENT)
Dept: ORTHOPEDICS | Facility: CLINIC | Age: 37
End: 2022-11-16
Payer: COMMERCIAL

## 2022-12-06 ENCOUNTER — TELEPHONE (OUTPATIENT)
Dept: NEUROLOGY | Facility: CLINIC | Age: 37
End: 2022-12-06
Payer: COMMERCIAL

## 2022-12-07 ENCOUNTER — PROCEDURE VISIT (OUTPATIENT)
Dept: NEUROLOGY | Facility: CLINIC | Age: 37
End: 2022-12-07
Payer: COMMERCIAL

## 2022-12-07 DIAGNOSIS — M25.511 CHRONIC RIGHT SHOULDER PAIN: ICD-10-CM

## 2022-12-07 DIAGNOSIS — M54.10 RADICULOPATHY AFFECTING UPPER EXTREMITY: ICD-10-CM

## 2022-12-07 DIAGNOSIS — G89.29 CHRONIC NECK PAIN: ICD-10-CM

## 2022-12-07 DIAGNOSIS — G89.29 CHRONIC RIGHT SHOULDER PAIN: ICD-10-CM

## 2022-12-07 DIAGNOSIS — M54.2 CHRONIC NECK PAIN: ICD-10-CM

## 2022-12-07 PROCEDURE — 95912 PR NERVE CONDUCTION STUDY; 11 -12 STUDIES: ICD-10-PCS | Mod: S$GLB,,, | Performed by: PHYSICAL MEDICINE & REHABILITATION

## 2022-12-07 PROCEDURE — 95912 NRV CNDJ TEST 11-12 STUDIES: CPT | Mod: S$GLB,,, | Performed by: PHYSICAL MEDICINE & REHABILITATION

## 2022-12-07 PROCEDURE — 95886 PR EMG COMPLETE, W/ NERVE CONDUCTION STUDIES, 5+ MUSCLES: ICD-10-PCS | Mod: S$GLB,,, | Performed by: PHYSICAL MEDICINE & REHABILITATION

## 2022-12-07 PROCEDURE — 95886 MUSC TEST DONE W/N TEST COMP: CPT | Mod: S$GLB,,, | Performed by: PHYSICAL MEDICINE & REHABILITATION

## 2022-12-07 NOTE — PROCEDURES
Test Date:  2022    Patient: Hillary Fountain : 1985 Physician: Eliceo Abebe D.O.   ID#: [id number] SEX: Female Ref. Phys: VenkatSamir III, *     HPI: Hillary Fountain is a 37 y.o.female who presents for NCS/EMG to evaluate right brachial plexopathy vs cervical radiculopathy.      NCV & EMG Findings:  All nerve conduction studies (as indicated in the following tables) were within normal limits.  All examined muscles (as indicated in the following table) showed no evidence of electrical instability.    Impression:  This is a normal electrophysiologic study of the right upper extremity.        ___________________________  Eliceo Abebe D.O.        NCS+  Motor Nerve Results      Latency Amplitude F-Lat Segment Distance CV Comment   Site (ms) Norm (mV) Norm (ms)  (cm) (m/s) Norm    Left Median (APB)   Wrist 2.5  < 4.4 6.1  > 5.9  Wrist-Palm - - -    Elbow 5.8 - 6.4 -  Elbow-Wrist 20 61  > 53    Right Median (APB)   Wrist 3.2  < 4.4 8.5  > 5.9  Wrist-Palm - - -    Elbow 6.4 - 9.7 -  Elbow-Wrist 20 63  > 53    Left Ulnar (ADM)   Wrist 2.2  < 3.7 7.1  > 3.0         Bel Elbow 4.9 - 5.9 -  Bel Elbow-Wrist 21 78  > 52    Right Ulnar (ADM)   Wrist 2.1  < 3.7 7.4  > 3.0         Bel Elbow 5.0 - 6.4 -  Bel Elbow-Wrist 22 76  > 52    Abv Elbow 6.1 - 8.0 -  Abv Elbow-Bel Elbow 9 82  > 43      Sensory Nerve Results      Latency (Peak) Amplitude (P-P) Segment Distance CV Comment   Site (ms) Norm (µV) Norm  (cm) (m/s) Norm    Left Median   Wrist-Dig II 2.9  < 4.0 70  > 13 Wrist-Dig II 14 48  > 39    Right Median   Wrist-Dig II 3.0  < 4.0 58  > 13 Wrist-Dig II 14 47  > 39    Left Ulnar   Wrist-Dig V 2.6  < 4.0 84  > 8 Wrist-Dig V 14 54  > 38    Right Ulnar   Wrist-Dig V 2.7  < 4.0 67  > 8 Wrist-Dig V 14 52  > 38    Right Dorsal Ulnar Cutaneous   Wrist-Dorsum 5th MC 2.2 - 19 - Wrist-Dorsum 5th MC - - -    Left Radial   Forearm-Wrist 1.45  < 2.8 28  > 11 Forearm-Wrist 10 69 -    Right Radial   Forearm-Wrist  1.70  < 2.8 33  > 11 Forearm-Wrist 10 59 -    Right Lateral Antebrachial Cutaneous   Lat Biceps-Lat Forearm 2.4  < 2.5 20  > 6 Lat Biceps-Lat Forearm 10 42 -      EMG+     Side Muscle Nerve Root Ins Act Fibs Psw Amp Dur Poly Recrt Int Pat Comment   Right Deltoid Axillary C5-C6 Nml Nml Nml Nml Nml 0 Nml Nml    Right Biceps Musculocut C5-C6 Nml Nml Nml Nml Nml 0 Nml Nml    Right Triceps Radial C6-C8 Nml Nml Nml Nml Nml 0 Nml Nml    Right Pronator Teres Median C6-C7 Nml Nml Nml Nml Nml 0 Nml Nml    Right FDI Ulnar C8-T1 Nml Nml Nml Nml Nml 0 Nml Nml            Waveforms:    Motor              Sensory

## 2022-12-08 ENCOUNTER — TELEPHONE (OUTPATIENT)
Dept: SPORTS MEDICINE | Facility: CLINIC | Age: 37
End: 2022-12-08
Payer: COMMERCIAL

## 2022-12-08 NOTE — TELEPHONE ENCOUNTER
----- Message from Samir Robledo III, PA-C sent at 12/7/2022 11:15 PM CST -----  Please let patient know that this EMG study was normal for her right upper extremity. Would still recommended f/u with Dr. Celeste which she already has scheduled.   ----- Message -----  From: Eliceo Abebe DO  Sent: 12/7/2022   2:36 PM CST  To: Samir Robledo III, PA-C

## 2022-12-19 ENCOUNTER — INITIAL CONSULT (OUTPATIENT)
Dept: VASCULAR SURGERY | Facility: CLINIC | Age: 37
End: 2022-12-19
Attending: SURGERY
Payer: COMMERCIAL

## 2022-12-19 VITALS
SYSTOLIC BLOOD PRESSURE: 109 MMHG | HEIGHT: 62 IN | DIASTOLIC BLOOD PRESSURE: 65 MMHG | WEIGHT: 138.88 LBS | HEART RATE: 75 BPM | TEMPERATURE: 98 F | BODY MASS INDEX: 25.55 KG/M2

## 2022-12-19 DIAGNOSIS — G54.0 NEUROGENIC THORACIC OUTLET SYNDROME OF RIGHT BRACHIAL PLEXUS: Primary | ICD-10-CM

## 2022-12-19 DIAGNOSIS — M25.511 CHRONIC RIGHT SHOULDER PAIN: ICD-10-CM

## 2022-12-19 DIAGNOSIS — M54.10 RADICULOPATHY AFFECTING UPPER EXTREMITY: ICD-10-CM

## 2022-12-19 DIAGNOSIS — G89.29 CHRONIC NECK PAIN: ICD-10-CM

## 2022-12-19 DIAGNOSIS — G89.29 CHRONIC RIGHT SHOULDER PAIN: ICD-10-CM

## 2022-12-19 DIAGNOSIS — M54.2 CHRONIC NECK PAIN: ICD-10-CM

## 2022-12-19 PROCEDURE — 99999 PR PBB SHADOW E&M-EST. PATIENT-LVL IV: CPT | Mod: PBBFAC,,, | Performed by: SURGERY

## 2022-12-19 PROCEDURE — 99999 PR PBB SHADOW E&M-EST. PATIENT-LVL IV: ICD-10-PCS | Mod: PBBFAC,,, | Performed by: SURGERY

## 2022-12-19 PROCEDURE — 99203 PR OFFICE/OUTPT VISIT, NEW, LEVL III, 30-44 MIN: ICD-10-PCS | Mod: S$GLB,,, | Performed by: SURGERY

## 2022-12-19 PROCEDURE — 99203 OFFICE O/P NEW LOW 30 MIN: CPT | Mod: S$GLB,,, | Performed by: SURGERY
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
## 2022-12-23 ENCOUNTER — OFFICE VISIT (OUTPATIENT)
Dept: INTERNAL MEDICINE | Facility: CLINIC | Age: 37
End: 2022-12-23
Payer: COMMERCIAL

## 2022-12-23 VITALS
DIASTOLIC BLOOD PRESSURE: 64 MMHG | SYSTOLIC BLOOD PRESSURE: 98 MMHG | HEART RATE: 70 BPM | HEIGHT: 62 IN | WEIGHT: 137.81 LBS | BODY MASS INDEX: 25.36 KG/M2 | OXYGEN SATURATION: 99 % | RESPIRATION RATE: 20 BRPM | TEMPERATURE: 98 F

## 2022-12-23 DIAGNOSIS — Z23 NEED FOR PROPHYLACTIC VACCINATION AND INOCULATION AGAINST INFLUENZA: ICD-10-CM

## 2022-12-23 DIAGNOSIS — G54.0 NEUROGENIC THORACIC OUTLET SYNDROME OF RIGHT BRACHIAL PLEXUS: ICD-10-CM

## 2022-12-23 DIAGNOSIS — R43.8 POST-COVID CHRONIC LOSS OF SMELL AND TASTE: ICD-10-CM

## 2022-12-23 DIAGNOSIS — Z00.00 WELL ADULT EXAM: Primary | ICD-10-CM

## 2022-12-23 DIAGNOSIS — U09.9 POST-COVID CHRONIC LOSS OF SMELL AND TASTE: ICD-10-CM

## 2022-12-23 PROBLEM — F60.3 BORDERLINE PERSONALITY DISORDER IN ADULT: Status: RESOLVED | Noted: 2020-12-07 | Resolved: 2022-12-23

## 2022-12-23 PROCEDURE — 1160F PR REVIEW ALL MEDS BY PRESCRIBER/CLIN PHARMACIST DOCUMENTED: ICD-10-PCS | Mod: CPTII,S$GLB,, | Performed by: FAMILY MEDICINE

## 2022-12-23 PROCEDURE — 1159F MED LIST DOCD IN RCRD: CPT | Mod: CPTII,S$GLB,, | Performed by: FAMILY MEDICINE

## 2022-12-23 PROCEDURE — 99395 PR PREVENTIVE VISIT,EST,18-39: ICD-10-PCS | Mod: 25,S$GLB,, | Performed by: FAMILY MEDICINE

## 2022-12-23 PROCEDURE — 3074F PR MOST RECENT SYSTOLIC BLOOD PRESSURE < 130 MM HG: ICD-10-PCS | Mod: CPTII,S$GLB,, | Performed by: FAMILY MEDICINE

## 2022-12-23 PROCEDURE — 90471 IMMUNIZATION ADMIN: CPT | Mod: S$GLB,,, | Performed by: FAMILY MEDICINE

## 2022-12-23 PROCEDURE — 90686 FLU VACCINE (QUAD) GREATER THAN OR EQUAL TO 3YO PRESERVATIVE FREE IM: ICD-10-PCS | Mod: S$GLB,,, | Performed by: FAMILY MEDICINE

## 2022-12-23 PROCEDURE — 99999 PR PBB SHADOW E&M-EST. PATIENT-LVL IV: ICD-10-PCS | Mod: PBBFAC,,, | Performed by: FAMILY MEDICINE

## 2022-12-23 PROCEDURE — 3074F SYST BP LT 130 MM HG: CPT | Mod: CPTII,S$GLB,, | Performed by: FAMILY MEDICINE

## 2022-12-23 PROCEDURE — 1160F RVW MEDS BY RX/DR IN RCRD: CPT | Mod: CPTII,S$GLB,, | Performed by: FAMILY MEDICINE

## 2022-12-23 PROCEDURE — 3078F PR MOST RECENT DIASTOLIC BLOOD PRESSURE < 80 MM HG: ICD-10-PCS | Mod: CPTII,S$GLB,, | Performed by: FAMILY MEDICINE

## 2022-12-23 PROCEDURE — 90471 FLU VACCINE (QUAD) GREATER THAN OR EQUAL TO 3YO PRESERVATIVE FREE IM: ICD-10-PCS | Mod: S$GLB,,, | Performed by: FAMILY MEDICINE

## 2022-12-23 PROCEDURE — 99395 PREV VISIT EST AGE 18-39: CPT | Mod: 25,S$GLB,, | Performed by: FAMILY MEDICINE

## 2022-12-23 PROCEDURE — 3008F BODY MASS INDEX DOCD: CPT | Mod: CPTII,S$GLB,, | Performed by: FAMILY MEDICINE

## 2022-12-23 PROCEDURE — 3008F PR BODY MASS INDEX (BMI) DOCUMENTED: ICD-10-PCS | Mod: CPTII,S$GLB,, | Performed by: FAMILY MEDICINE

## 2022-12-23 PROCEDURE — 99999 PR PBB SHADOW E&M-EST. PATIENT-LVL IV: CPT | Mod: PBBFAC,,, | Performed by: FAMILY MEDICINE

## 2022-12-23 PROCEDURE — 90686 IIV4 VACC NO PRSV 0.5 ML IM: CPT | Mod: S$GLB,,, | Performed by: FAMILY MEDICINE

## 2022-12-23 PROCEDURE — 1159F PR MEDICATION LIST DOCUMENTED IN MEDICAL RECORD: ICD-10-PCS | Mod: CPTII,S$GLB,, | Performed by: FAMILY MEDICINE

## 2022-12-23 PROCEDURE — 3078F DIAST BP <80 MM HG: CPT | Mod: CPTII,S$GLB,, | Performed by: FAMILY MEDICINE
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
## 2022-12-24 ENCOUNTER — LAB VISIT (OUTPATIENT)
Dept: LAB | Facility: HOSPITAL | Age: 37
End: 2022-12-24
Attending: FAMILY MEDICINE
Payer: COMMERCIAL

## 2022-12-24 DIAGNOSIS — Z00.00 WELL ADULT EXAM: ICD-10-CM

## 2022-12-24 LAB
ALBUMIN SERPL BCP-MCNC: 4.5 G/DL (ref 3.5–5.2)
ALP SERPL-CCNC: 60 U/L (ref 55–135)
ALT SERPL W/O P-5'-P-CCNC: 28 U/L (ref 10–44)
ANION GAP SERPL CALC-SCNC: 8 MMOL/L (ref 8–16)
AST SERPL-CCNC: 26 U/L (ref 10–40)
BASOPHILS # BLD AUTO: 0.09 K/UL (ref 0–0.2)
BASOPHILS NFR BLD: 1.6 % (ref 0–1.9)
BILIRUB SERPL-MCNC: 0.7 MG/DL (ref 0.1–1)
BUN SERPL-MCNC: 7 MG/DL (ref 6–20)
CALCIUM SERPL-MCNC: 9.7 MG/DL (ref 8.7–10.5)
CHLORIDE SERPL-SCNC: 103 MMOL/L (ref 95–110)
CHOLEST SERPL-MCNC: 158 MG/DL (ref 120–199)
CHOLEST/HDLC SERPL: 2.4 {RATIO} (ref 2–5)
CO2 SERPL-SCNC: 29 MMOL/L (ref 23–29)
CREAT SERPL-MCNC: 0.9 MG/DL (ref 0.5–1.4)
DIFFERENTIAL METHOD: ABNORMAL
EOSINOPHIL # BLD AUTO: 0.1 K/UL (ref 0–0.5)
EOSINOPHIL NFR BLD: 2.1 % (ref 0–8)
ERYTHROCYTE [DISTWIDTH] IN BLOOD BY AUTOMATED COUNT: 11.8 % (ref 11.5–14.5)
EST. GFR  (NO RACE VARIABLE): >60 ML/MIN/1.73 M^2
ESTIMATED AVG GLUCOSE: 103 MG/DL (ref 68–131)
GLUCOSE SERPL-MCNC: 92 MG/DL (ref 70–110)
HBA1C MFR BLD: 5.2 % (ref 4–5.6)
HCT VFR BLD AUTO: 40.8 % (ref 37–48.5)
HDLC SERPL-MCNC: 67 MG/DL (ref 40–75)
HDLC SERPL: 42.4 % (ref 20–50)
HGB BLD-MCNC: 13.3 G/DL (ref 12–16)
IMM GRANULOCYTES # BLD AUTO: 0 K/UL (ref 0–0.04)
IMM GRANULOCYTES NFR BLD AUTO: 0 % (ref 0–0.5)
LDLC SERPL CALC-MCNC: 74 MG/DL (ref 63–159)
LYMPHOCYTES # BLD AUTO: 1.7 K/UL (ref 1–4.8)
LYMPHOCYTES NFR BLD: 29.5 % (ref 18–48)
MCH RBC QN AUTO: 31.9 PG (ref 27–31)
MCHC RBC AUTO-ENTMCNC: 32.6 G/DL (ref 32–36)
MCV RBC AUTO: 98 FL (ref 82–98)
MONOCYTES # BLD AUTO: 0.4 K/UL (ref 0.3–1)
MONOCYTES NFR BLD: 7.5 % (ref 4–15)
NEUTROPHILS # BLD AUTO: 3.4 K/UL (ref 1.8–7.7)
NEUTROPHILS NFR BLD: 59.3 % (ref 38–73)
NONHDLC SERPL-MCNC: 91 MG/DL
NRBC BLD-RTO: 0 /100 WBC
PLATELET # BLD AUTO: 328 K/UL (ref 150–450)
PMV BLD AUTO: 10.7 FL (ref 9.2–12.9)
POTASSIUM SERPL-SCNC: 3.7 MMOL/L (ref 3.5–5.1)
PROT SERPL-MCNC: 7.8 G/DL (ref 6–8.4)
RBC # BLD AUTO: 4.17 M/UL (ref 4–5.4)
SODIUM SERPL-SCNC: 140 MMOL/L (ref 136–145)
T4 FREE SERPL-MCNC: 0.91 NG/DL (ref 0.71–1.51)
TRIGL SERPL-MCNC: 85 MG/DL (ref 30–150)
TSH SERPL DL<=0.005 MIU/L-ACNC: 1.41 UIU/ML (ref 0.4–4)
WBC # BLD AUTO: 5.77 K/UL (ref 3.9–12.7)

## 2022-12-24 PROCEDURE — 80061 LIPID PANEL: CPT | Performed by: FAMILY MEDICINE

## 2022-12-24 PROCEDURE — 80053 COMPREHEN METABOLIC PANEL: CPT | Performed by: FAMILY MEDICINE

## 2022-12-24 PROCEDURE — 85025 COMPLETE CBC W/AUTO DIFF WBC: CPT | Performed by: FAMILY MEDICINE

## 2022-12-24 PROCEDURE — 84443 ASSAY THYROID STIM HORMONE: CPT | Performed by: FAMILY MEDICINE

## 2022-12-24 PROCEDURE — 83036 HEMOGLOBIN GLYCOSYLATED A1C: CPT | Performed by: FAMILY MEDICINE

## 2022-12-24 PROCEDURE — 36415 COLL VENOUS BLD VENIPUNCTURE: CPT | Mod: PO | Performed by: FAMILY MEDICINE

## 2022-12-24 PROCEDURE — 84439 ASSAY OF FREE THYROXINE: CPT | Performed by: FAMILY MEDICINE

## 2022-12-26 ENCOUNTER — PATIENT MESSAGE (OUTPATIENT)
Dept: VASCULAR SURGERY | Facility: CLINIC | Age: 37
End: 2022-12-26
Payer: COMMERCIAL

## 2023-01-09 ENCOUNTER — CLINICAL SUPPORT (OUTPATIENT)
Dept: REHABILITATION | Facility: HOSPITAL | Age: 38
End: 2023-01-09
Attending: SURGERY
Payer: COMMERCIAL

## 2023-01-09 DIAGNOSIS — G54.0 NEUROGENIC THORACIC OUTLET SYNDROME OF RIGHT BRACHIAL PLEXUS: Primary | ICD-10-CM

## 2023-01-09 PROCEDURE — 97110 THERAPEUTIC EXERCISES: CPT | Mod: PO

## 2023-01-09 PROCEDURE — 97162 PT EVAL MOD COMPLEX 30 MIN: CPT | Mod: PO
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
## 2023-01-10 ENCOUNTER — OFFICE VISIT (OUTPATIENT)
Dept: URGENT CARE | Facility: CLINIC | Age: 38
End: 2023-01-10
Payer: COMMERCIAL

## 2023-01-10 ENCOUNTER — TELEPHONE (OUTPATIENT)
Dept: INTERNAL MEDICINE | Facility: CLINIC | Age: 38
End: 2023-01-10
Payer: COMMERCIAL

## 2023-01-10 VITALS
TEMPERATURE: 99 F | RESPIRATION RATE: 16 BRPM | BODY MASS INDEX: 25.21 KG/M2 | WEIGHT: 137 LBS | DIASTOLIC BLOOD PRESSURE: 68 MMHG | HEIGHT: 62 IN | HEART RATE: 73 BPM | OXYGEN SATURATION: 98 % | SYSTOLIC BLOOD PRESSURE: 99 MMHG

## 2023-01-10 DIAGNOSIS — N39.0 URINARY TRACT INFECTION WITHOUT HEMATURIA, SITE UNSPECIFIED: ICD-10-CM

## 2023-01-10 DIAGNOSIS — R30.0 DYSURIA: ICD-10-CM

## 2023-01-10 DIAGNOSIS — R11.0 NAUSEA: Primary | ICD-10-CM

## 2023-01-10 DIAGNOSIS — R35.0 FREQUENCY OF MICTURITION: ICD-10-CM

## 2023-01-10 LAB
B-HCG UR QL: NEGATIVE
BILIRUB UR QL STRIP: NEGATIVE
CTP QC/QA: YES
GLUCOSE UR QL STRIP: NEGATIVE
KETONES UR QL STRIP: NEGATIVE
LEUKOCYTE ESTERASE UR QL STRIP: NEGATIVE
PH, POC UA: 5 (ref 5–8)
POC BLOOD, URINE: POSITIVE
POC NITRATES, URINE: NEGATIVE
PROT UR QL STRIP: NEGATIVE
SP GR UR STRIP: <=1.005 (ref 1–1.03)
UROBILINOGEN UR STRIP-ACNC: ABNORMAL (ref 0.1–1.1)

## 2023-01-10 PROCEDURE — 1159F MED LIST DOCD IN RCRD: CPT | Mod: CPTII,S$GLB,, | Performed by: FAMILY MEDICINE

## 2023-01-10 PROCEDURE — 99214 OFFICE O/P EST MOD 30 MIN: CPT | Mod: 25,S$GLB,, | Performed by: FAMILY MEDICINE

## 2023-01-10 PROCEDURE — 99214 PR OFFICE/OUTPT VISIT, EST, LEVL IV, 30-39 MIN: ICD-10-PCS | Mod: 25,S$GLB,, | Performed by: FAMILY MEDICINE

## 2023-01-10 PROCEDURE — 3008F BODY MASS INDEX DOCD: CPT | Mod: CPTII,S$GLB,, | Performed by: FAMILY MEDICINE

## 2023-01-10 PROCEDURE — 87086 URINE CULTURE/COLONY COUNT: CPT | Performed by: FAMILY MEDICINE

## 2023-01-10 PROCEDURE — 1160F PR REVIEW ALL MEDS BY PRESCRIBER/CLIN PHARMACIST DOCUMENTED: ICD-10-PCS | Mod: CPTII,S$GLB,, | Performed by: FAMILY MEDICINE

## 2023-01-10 PROCEDURE — S0119 PR ONDANSETRON, ORAL, 4MG: ICD-10-PCS | Mod: S$GLB,,, | Performed by: FAMILY MEDICINE

## 2023-01-10 PROCEDURE — 3078F PR MOST RECENT DIASTOLIC BLOOD PRESSURE < 80 MM HG: ICD-10-PCS | Mod: CPTII,S$GLB,, | Performed by: FAMILY MEDICINE

## 2023-01-10 PROCEDURE — 3074F PR MOST RECENT SYSTOLIC BLOOD PRESSURE < 130 MM HG: ICD-10-PCS | Mod: CPTII,S$GLB,, | Performed by: FAMILY MEDICINE

## 2023-01-10 PROCEDURE — 3008F PR BODY MASS INDEX (BMI) DOCUMENTED: ICD-10-PCS | Mod: CPTII,S$GLB,, | Performed by: FAMILY MEDICINE

## 2023-01-10 PROCEDURE — 81003 URINALYSIS AUTO W/O SCOPE: CPT | Mod: QW,S$GLB,, | Performed by: FAMILY MEDICINE

## 2023-01-10 PROCEDURE — 1160F RVW MEDS BY RX/DR IN RCRD: CPT | Mod: CPTII,S$GLB,, | Performed by: FAMILY MEDICINE

## 2023-01-10 PROCEDURE — 81025 POCT URINE PREGNANCY: ICD-10-PCS | Mod: S$GLB,,, | Performed by: FAMILY MEDICINE

## 2023-01-10 PROCEDURE — 81025 URINE PREGNANCY TEST: CPT | Mod: S$GLB,,, | Performed by: FAMILY MEDICINE

## 2023-01-10 PROCEDURE — 3074F SYST BP LT 130 MM HG: CPT | Mod: CPTII,S$GLB,, | Performed by: FAMILY MEDICINE

## 2023-01-10 PROCEDURE — S0119 ONDANSETRON 4 MG: HCPCS | Mod: S$GLB,,, | Performed by: FAMILY MEDICINE

## 2023-01-10 PROCEDURE — 96372 PR INJECTION,THERAP/PROPH/DIAG2ST, IM OR SUBCUT: ICD-10-PCS | Mod: S$GLB,,, | Performed by: FAMILY MEDICINE

## 2023-01-10 PROCEDURE — 1159F PR MEDICATION LIST DOCUMENTED IN MEDICAL RECORD: ICD-10-PCS | Mod: CPTII,S$GLB,, | Performed by: FAMILY MEDICINE

## 2023-01-10 PROCEDURE — 81003 POCT URINALYSIS, DIPSTICK, AUTOMATED, W/O SCOPE: ICD-10-PCS | Mod: QW,S$GLB,, | Performed by: FAMILY MEDICINE

## 2023-01-10 PROCEDURE — 3078F DIAST BP <80 MM HG: CPT | Mod: CPTII,S$GLB,, | Performed by: FAMILY MEDICINE

## 2023-01-10 PROCEDURE — 96372 THER/PROPH/DIAG INJ SC/IM: CPT | Mod: S$GLB,,, | Performed by: FAMILY MEDICINE

## 2023-01-10 RX ORDER — ONDANSETRON 4 MG/1
4 TABLET, ORALLY DISINTEGRATING ORAL EVERY 8 HOURS PRN
Qty: 9 TABLET | Refills: 0 | Status: SHIPPED | OUTPATIENT
Start: 2023-01-10 | End: 2023-01-13

## 2023-01-10 RX ORDER — CEFTRIAXONE 1 G/1
1 INJECTION, POWDER, FOR SOLUTION INTRAMUSCULAR; INTRAVENOUS
Status: COMPLETED | OUTPATIENT
Start: 2023-01-10 | End: 2023-01-10

## 2023-01-10 RX ORDER — SULFAMETHOXAZOLE AND TRIMETHOPRIM 800; 160 MG/1; MG/1
1 TABLET ORAL 2 TIMES DAILY
Qty: 14 TABLET | Refills: 0 | Status: SHIPPED | OUTPATIENT
Start: 2023-01-10 | End: 2023-01-18

## 2023-01-10 RX ORDER — ONDANSETRON 4 MG/1
4 TABLET, ORALLY DISINTEGRATING ORAL
Status: COMPLETED | OUTPATIENT
Start: 2023-01-10 | End: 2023-01-10

## 2023-01-10 RX ADMIN — ONDANSETRON 4 MG: 4 TABLET, ORALLY DISINTEGRATING ORAL at 12:01

## 2023-01-10 RX ADMIN — CEFTRIAXONE 1 G: 1 INJECTION, POWDER, FOR SOLUTION INTRAMUSCULAR; INTRAVENOUS at 12:01

## 2023-01-10 NOTE — TELEPHONE ENCOUNTER
I recommend that the patient be seen for evaluation.  Please schedule recommend that the patient go to urgent care for evaluation.  Please inform the patient.  Thank you.

## 2023-01-10 NOTE — TELEPHONE ENCOUNTER
----- Message from Lesley Jarrett sent at 1/10/2023  8:14 AM CST -----  Contact: Pt [phone]  Pt called and stated that she is having urination issues and flank pain and nausea. She stated that she would like something called in for nausea and she stated that she cannot take cipro. Please call and advise     [facility]/pharmacy #[id number] - [place] - [address] [id number] [facility]  [place] [id number]  Phone: [phone] Fax: [phone]

## 2023-01-10 NOTE — TELEPHONE ENCOUNTER
Let the pt know the provide recommendations and she verbalized understanding that she needs to be evaluated.

## 2023-01-10 NOTE — TELEPHONE ENCOUNTER
Pt states she cannot urinate, feels as she does but nothing, flank pain, nausea and feels as though she keeps pushing. She thinks its an UTI and would like something for nausea sent to her pharmacy, let her know she may need evaluation.

## 2023-01-10 NOTE — PROGRESS NOTES
"Subjective:       Patient ID: Hillary Fountain is a 37 y.o. female.    Vitals:  height is 5' 2" (1.575 m) and weight is 62.1 kg (137 lb). Her oral temperature is 98.5 °F (36.9 °C). Her blood pressure is 99/68 and her pulse is 73. Her respiration is 16 and oxygen saturation is 98%.     Chief Complaint: Urinary Tract Infection    This is a 37 y.o. female who presents today with c/o dysuria, frequency, flank pain, chills, and nausea for last few days.  Denies  vomiting, blood on urine, vaginal discharge, weakness/dizziness.        Urinary Tract Infection   This is a new problem. The current episode started 1 to 4 weeks ago. The problem has been gradually worsening. The quality of the pain is described as aching. The pain is at a severity of 8/10. There has been no fever. She is Sexually active. There is No history of pyelonephritis. Associated symptoms include chills, flank pain, frequency, nausea, urgency, constipation and withholding. She has tried nothing for the symptoms. The treatment provided no relief. Her past medical history is significant for recurrent UTIs.   Constitution: Positive for chills.   Gastrointestinal:  Positive for nausea and constipation.   Genitourinary:  Positive for frequency, urgency and flank pain.     Objective:      Physical Exam   Constitutional: She is oriented to person, place, and time. She appears well-developed.   HENT:   Head: Normocephalic and atraumatic.   Ears:   Right Ear: External ear normal.   Left Ear: External ear normal.   Nose: Nose normal. No nasal deformity. No epistaxis.   Mouth/Throat: Oropharynx is clear and moist and mucous membranes are normal.   Eyes: Lids are normal.   Neck: Trachea normal and phonation normal. Neck supple.   Cardiovascular: Normal pulses.   No murmur heard.  Pulmonary/Chest: Effort normal. No stridor. No respiratory distress. She has no wheezes. She has no rhonchi. She has no rales.   Abdominal: Normal appearance and bowel sounds are normal. " She exhibits no distension and no mass. Soft. flat abdomen There is no abdominal tenderness. There is right CVA tenderness. There is no left CVA tenderness.   Neurological: She is alert and oriented to person, place, and time.   Skin: Skin is warm, dry and intact.   Psychiatric: Her speech is normal and behavior is normal.   Nursing note and vitals reviewed.      Assessment:       1. Nausea    2. Urinary tract infection without hematuria, site unspecified    3. Dysuria    4. Frequency of micturition          Plan:         Nausea  -     POCT urine pregnancy    Urinary tract infection without hematuria, site unspecified  -     POCT Urinalysis, Dipstick, Automated, W/O Scope  -     Urine culture    Dysuria    Frequency of micturition    Other orders  -     cefTRIAXone injection 1 g  -     ondansetron disintegrating tablet 4 mg  -     ondansetron (ZOFRAN-ODT) 4 MG TbDL; Take 1 tablet (4 mg total) by mouth every 8 (eight) hours as needed (nauea).  Dispense: 9 tablet; Refill: 0  -     sulfamethoxazole-trimethoprim 800-160mg (BACTRIM DS) 800-160 mg Tab; Take 1 tablet by mouth 2 (two) times daily. for 7 days  Dispense: 14 tablet; Refill: 0

## 2023-01-12 ENCOUNTER — TELEPHONE (OUTPATIENT)
Dept: INTERNAL MEDICINE | Facility: CLINIC | Age: 38
End: 2023-01-12
Payer: COMMERCIAL

## 2023-01-12 ENCOUNTER — PATIENT MESSAGE (OUTPATIENT)
Dept: INTERNAL MEDICINE | Facility: CLINIC | Age: 38
End: 2023-01-12
Payer: COMMERCIAL

## 2023-01-12 DIAGNOSIS — N39.0 RECURRENT UTI: Primary | ICD-10-CM

## 2023-01-12 LAB — BACTERIA UR CULT: NO GROWTH

## 2023-01-12 NOTE — TELEPHONE ENCOUNTER
Message sent to referral coordinator to schedule  Urology apt    Pt is aware she will be called to schedule this apt

## 2023-01-12 NOTE — TELEPHONE ENCOUNTER
LVM for pt letting her know I got her message about needing a referral for urology, but I need to know why so we have a diag to put on the referral  I also sent her a portal message

## 2023-01-12 NOTE — TELEPHONE ENCOUNTER
Called pt and she is requesting a referral for Urology. She said she went to  and was diag with a UTI, but she is still having problems  She states that she has frequent UTI's, which GYN usually takes care of, and kidney problems since she was 14 years old. She has a history of kidney stones and kidney infections, but hasn't had any problems since 2020. She is new to [facility] (2021) and doesn't have a urologist    Please put in referral or advise

## 2023-01-12 NOTE — TELEPHONE ENCOUNTER
----- Message from Poornima Mcleod sent at 1/12/2023  8:20 AM CST -----  Contact: [phone]  Pt is requesting a referral for urology.

## 2023-01-12 NOTE — TELEPHONE ENCOUNTER
----- Message from Uzma Ozuna sent at 1/12/2023 11:43 AM CST -----  Contact: [phone] patient  Patient is returning a phone call.  Who left a message for the patient: Kacey  Does patient know what this is regarding:  yes  Would you like a call back, or a response through your [facility] portal?:   Call Back Please  Comments:

## 2023-01-13 ENCOUNTER — HOSPITAL ENCOUNTER (OUTPATIENT)
Dept: RADIOLOGY | Facility: HOSPITAL | Age: 38
Discharge: HOME | End: 2023-01-13
Attending: UROLOGY
Payer: COMMERCIAL

## 2023-01-13 ENCOUNTER — TELEPHONE (OUTPATIENT)
Dept: URGENT CARE | Facility: CLINIC | Age: 38
End: 2023-01-13
Payer: COMMERCIAL

## 2023-01-13 ENCOUNTER — OFFICE VISIT (OUTPATIENT)
Dept: UROLOGY | Facility: CLINIC | Age: 38
End: 2023-01-13
Payer: COMMERCIAL

## 2023-01-13 VITALS
BODY MASS INDEX: 24.51 KG/M2 | WEIGHT: 133.19 LBS | HEIGHT: 62 IN | HEART RATE: 82 BPM | SYSTOLIC BLOOD PRESSURE: 113 MMHG | DIASTOLIC BLOOD PRESSURE: 79 MMHG

## 2023-01-13 DIAGNOSIS — N39.0 RECURRENT UTI: ICD-10-CM

## 2023-01-13 DIAGNOSIS — R10.10 PAIN OF UPPER ABDOMEN: ICD-10-CM

## 2023-01-13 PROCEDURE — 74176 CT RENAL STONE STUDY ABD PELVIS WO: ICD-10-PCS | Mod: 26,,, | Performed by: RADIOLOGY

## 2023-01-13 PROCEDURE — 3074F SYST BP LT 130 MM HG: CPT | Mod: CPTII,S$GLB,, | Performed by: UROLOGY

## 2023-01-13 PROCEDURE — 87086 URINE CULTURE/COLONY COUNT: CPT | Performed by: UROLOGY

## 2023-01-13 PROCEDURE — 74176 CT ABD & PELVIS W/O CONTRAST: CPT | Mod: TC

## 2023-01-13 PROCEDURE — 99999 PR PBB SHADOW E&M-EST. PATIENT-LVL III: ICD-10-PCS | Mod: PBBFAC,,, | Performed by: UROLOGY

## 2023-01-13 PROCEDURE — 3078F PR MOST RECENT DIASTOLIC BLOOD PRESSURE < 80 MM HG: ICD-10-PCS | Mod: CPTII,S$GLB,, | Performed by: UROLOGY

## 2023-01-13 PROCEDURE — 3008F BODY MASS INDEX DOCD: CPT | Mod: CPTII,S$GLB,, | Performed by: UROLOGY

## 2023-01-13 PROCEDURE — 3074F PR MOST RECENT SYSTOLIC BLOOD PRESSURE < 130 MM HG: ICD-10-PCS | Mod: CPTII,S$GLB,, | Performed by: UROLOGY

## 2023-01-13 PROCEDURE — 3078F DIAST BP <80 MM HG: CPT | Mod: CPTII,S$GLB,, | Performed by: UROLOGY

## 2023-01-13 PROCEDURE — 99999 PR PBB SHADOW E&M-EST. PATIENT-LVL III: CPT | Mod: PBBFAC,,, | Performed by: UROLOGY

## 2023-01-13 PROCEDURE — 99204 PR OFFICE/OUTPT VISIT, NEW, LEVL IV, 45-59 MIN: ICD-10-PCS | Mod: S$GLB,,, | Performed by: UROLOGY

## 2023-01-13 PROCEDURE — 3008F PR BODY MASS INDEX (BMI) DOCUMENTED: ICD-10-PCS | Mod: CPTII,S$GLB,, | Performed by: UROLOGY

## 2023-01-13 PROCEDURE — 74176 CT ABD & PELVIS W/O CONTRAST: CPT | Mod: 26,,, | Performed by: RADIOLOGY

## 2023-01-13 PROCEDURE — 99204 OFFICE O/P NEW MOD 45 MIN: CPT | Mod: S$GLB,,, | Performed by: UROLOGY

## 2023-01-15 LAB — BACTERIA UR CULT: NO GROWTH

## 2023-01-16 NOTE — PROGRESS NOTES
[facility]    Urolithiasis New Note    2/5/2023    Referred by:  Gerardo Pink MD    HPI: Hillary Fountain is a very pleasant 37 y.o. female who is a new patient to our department here for evaluation of suspected urolithiasis. A stone protocol CT scan was ordered and reviewed today.     Upper tract imaging reviewed today:  Stone Protocol CT:  Normal upper urinary tract; a pelvic phlebolith is seen and is unchanged in the last 4 years.     A review of 10+ systems was conducted with pertinent positive and negative findings documented in HPI with all other systems reviewed and negative.    Past medical, family, surgical and social history reviewed as documented in chart with pertinent positive medical, family, surgical and social history detailed in HPI.    Exam Findings:    Const: no acute distress, conversant and alert  Eyes: anicteric, extraocular muscles intact  ENMT: normocephalic, Nl oral membranes  Cardio: no cyanosis, nl cap refill  Pulm: no tachypnea; no resp distress  Abd: soft, no tenderness  Musc: no laceration, no tenderness  Neuro: alert; oriented x 3  Skin: warm, dry; no petichiae  Psych: no anxiety; normal speech       Assessment/Plan:    Urolithiasis without obstruction of kidney (new, addt'l workup):  No evidence of urolithiasis. Urine sent for culture, though prior urine cultures have been negative.

## 2023-01-17 NOTE — TELEPHONE ENCOUNTER
Pt states she thought the needed to see urology due to having flank pain and UTI symptoms but went to UC, negative UC x2 on 1/10 and 1/13 and had a CT scan.  She also reports vaginal complaints such as irritation.  Feels like she would have a rash but nothing visible.  Unsure if there is an odor, not able to smell since she had COVID.  Recommended an appt, scheduled with Gela tomorrow.

## 2023-01-18 ENCOUNTER — OFFICE VISIT (OUTPATIENT)
Dept: OBSTETRICS AND GYNECOLOGY | Facility: CLINIC | Age: 38
End: 2023-01-18
Payer: COMMERCIAL

## 2023-01-18 ENCOUNTER — LAB VISIT (OUTPATIENT)
Dept: LAB | Facility: HOSPITAL | Age: 38
End: 2023-01-18
Payer: COMMERCIAL

## 2023-01-18 VITALS
BODY MASS INDEX: 25.42 KG/M2 | SYSTOLIC BLOOD PRESSURE: 101 MMHG | DIASTOLIC BLOOD PRESSURE: 69 MMHG | HEIGHT: 62 IN | WEIGHT: 138.13 LBS

## 2023-01-18 DIAGNOSIS — N76.1 SUBACUTE VAGINITIS: Primary | ICD-10-CM

## 2023-01-18 DIAGNOSIS — Z11.3 SCREENING FOR STDS (SEXUALLY TRANSMITTED DISEASES): ICD-10-CM

## 2023-01-18 DIAGNOSIS — N76.1 SUBACUTE VAGINITIS: ICD-10-CM

## 2023-01-18 DIAGNOSIS — R35.0 URINARY FREQUENCY: ICD-10-CM

## 2023-01-18 PROCEDURE — 87591 N.GONORRHOEAE DNA AMP PROB: CPT

## 2023-01-18 PROCEDURE — 99999 PR PBB SHADOW E&M-EST. PATIENT-LVL III: ICD-10-PCS | Mod: PBBFAC,,,

## 2023-01-18 PROCEDURE — 3078F DIAST BP <80 MM HG: CPT | Mod: CPTII,S$GLB,,

## 2023-01-18 PROCEDURE — 87389 HIV-1 AG W/HIV-1&-2 AB AG IA: CPT

## 2023-01-18 PROCEDURE — 3008F PR BODY MASS INDEX (BMI) DOCUMENTED: ICD-10-PCS | Mod: CPTII,S$GLB,,

## 2023-01-18 PROCEDURE — 99999 PR PBB SHADOW E&M-EST. PATIENT-LVL III: CPT | Mod: PBBFAC,,,

## 2023-01-18 PROCEDURE — 99214 PR OFFICE/OUTPT VISIT, EST, LEVL IV, 30-39 MIN: ICD-10-PCS | Mod: S$GLB,,,

## 2023-01-18 PROCEDURE — 1159F PR MEDICATION LIST DOCUMENTED IN MEDICAL RECORD: ICD-10-PCS | Mod: CPTII,S$GLB,,

## 2023-01-18 PROCEDURE — 3074F SYST BP LT 130 MM HG: CPT | Mod: CPTII,S$GLB,,

## 2023-01-18 PROCEDURE — 1159F MED LIST DOCD IN RCRD: CPT | Mod: CPTII,S$GLB,,

## 2023-01-18 PROCEDURE — 3008F BODY MASS INDEX DOCD: CPT | Mod: CPTII,S$GLB,,

## 2023-01-18 PROCEDURE — 81003 URINALYSIS AUTO W/O SCOPE: CPT

## 2023-01-18 PROCEDURE — 86780 TREPONEMA PALLIDUM: CPT

## 2023-01-18 PROCEDURE — 3074F PR MOST RECENT SYSTOLIC BLOOD PRESSURE < 130 MM HG: ICD-10-PCS | Mod: CPTII,S$GLB,,

## 2023-01-18 PROCEDURE — 3078F PR MOST RECENT DIASTOLIC BLOOD PRESSURE < 80 MM HG: ICD-10-PCS | Mod: CPTII,S$GLB,,

## 2023-01-18 PROCEDURE — 99214 OFFICE O/P EST MOD 30 MIN: CPT | Mod: S$GLB,,,

## 2023-01-18 PROCEDURE — 86592 SYPHILIS TEST NON-TREP QUAL: CPT

## 2023-01-18 PROCEDURE — 80074 ACUTE HEPATITIS PANEL: CPT

## 2023-01-18 PROCEDURE — 81514 NFCT DS BV&VAGINITIS DNA ALG: CPT

## 2023-01-18 PROCEDURE — 87086 URINE CULTURE/COLONY COUNT: CPT

## 2023-01-18 NOTE — PROGRESS NOTES
"    Chief Complaint: Vaginal  Discomfort     HPI:      Hillary Fountain is a 37 y.o.  who presents with complaints of vaginal  discomfort for 3 weeks.  Current symptoms include flank pain, urinary frequency, feeling of incomplete voiding, burning, some itching, and light tan and pink vaginal discharge. Denies odor. Endorses having chills and nausea in the past few weeks.  Pt went to urgent care at onset of symptoms and was treated for UTI with course of antibiotics with no resolution of symptoms; urine cx showed no growth.  Urology consulted and ordered CT scan to r/o kidney stone.  CT scan showed phlebolith rather than ureteral stone.  She is not currently sexually active.  Last sexual encounter Dec 17th, unprotected.  She is using IUD for contraception. She has not experienced symptoms like this before. Patient does not douche. Periods are normal and regular. Requests STD testing today.    ROS:   GENERAL: Denies weight gain or weight loss. Feeling well overall.   SKIN: Denies rash or lesions.   ABDOMEN: Denies abdominal pain, diarrhea, vomiting, change in appetite or rectal bleeding. + nausea, constipation  URINARY: +  frequency, dysuria, - hematuria.  GYN: See HPI.    Physical Exam:      PHYSICAL EXAM:   Vitals:    23 1454   BP: 101/69   Weight: 62.6 kg (138 lb 1.9 oz)   Height: 5' 2" (1.575 m)   PainSc: 0-No pain     Body mass index is 25.26 kg/m².    General: No acute distress, alert and engaged  VULVA: normal appearing vulva with no masses, tenderness or lesions   VAGINA: normal appearing vagina with normal color. + tan yellow discharge, no lesions   CERVIX: normal appearing cervix without discharge or lesions; IUD strings visible  UTERUS: uterus is normal size, shape, consistency and nontender   ADNEXA: normal adnexa in size, nontender and no masses    Assessment/Plan:     Subacute vaginitis  -     C. trachomatis/N. gonorrhoeae by AMP DNA  -     HIV-1 and HIV-2 antibodies; Future; Expected date: " 01/18/2023  -     RPR; Future; Expected date: 01/18/2023  -     Hepatitis B surface antigen; Future; Expected date: 01/18/2023  -     Hepatitis panel, acute; Future; Expected date: 01/18/2023  -     FTA antibodies, IgG and IgM; Future; Expected date: 01/18/2023    Screening for STDs (sexually transmitted diseases)  -     C. trachomatis/N. gonorrhoeae by AMP DNA  -     HIV-1 and HIV-2 antibodies; Future; Expected date: 01/18/2023  -     RPR; Future; Expected date: 01/18/2023  -     Hepatitis B surface antigen; Future; Expected date: 01/18/2023  -     Hepatitis panel, acute; Future; Expected date: 01/18/2023  -     Vaginosis Screen by DNA Probe  -     FTA antibodies, IgG and IgM; Future; Expected date: 01/18/2023    Urinary frequency  -     Urinalysis  -     CULTURE, URINE    - Affirm and GCC collected.  Blood work ordered.  - Will await results of swabs and urine cx to treat at this time.    Patient was counseled today on vaginitis prevention including :  a. avoiding feminine products such as deoderant soaps, body wash, bubble bath, douches, scented toilet paper, deoderant tampons or pads, feminine wipes, chronic pad use, etc.  b. avoiding other vulvovaginal irritants such as long hot baths, humidity, tight, synthetic clothing, chlorine and sitting around in wet bathing suits  c. wearing cotton underwear, avoiding thong underwear and no underwear to bed  d. taking showers instead of baths and use a hair dryer on cool setting afterwards to dry  e. wearing cotton to exercise and shower immediately after exercise and change clothes  f. using polyurethane condoms without spermicide if sexually active and symptoms are triggered by intercourse    Use of MyChart and Patient Portal recommended to patient today.    FOLLOW UP: PRN lack of improvement.    Yumiko Rush, DNP

## 2023-01-19 LAB
BILIRUB UR QL STRIP: NEGATIVE
C TRACH DNA SPEC QL NAA+PROBE: NOT DETECTED
CLARITY UR REFRACT.AUTO: CLEAR
COLOR UR AUTO: YELLOW
GLUCOSE UR QL STRIP: NEGATIVE
HAV IGM SERPL QL IA: NORMAL
HBV CORE IGM SERPL QL IA: NORMAL
HBV SURFACE AG SERPL QL IA: NORMAL
HBV SURFACE AG SERPL QL IA: NORMAL
HCV AB SERPL QL IA: NORMAL
HGB UR QL STRIP: NEGATIVE
HIV 1+2 AB+HIV1 P24 AG SERPL QL IA: NORMAL
KETONES UR QL STRIP: NEGATIVE
LEUKOCYTE ESTERASE UR QL STRIP: NEGATIVE
N GONORRHOEA DNA SPEC QL NAA+PROBE: NOT DETECTED
NITRITE UR QL STRIP: NEGATIVE
PH UR STRIP: 8 [PH] (ref 5–8)
PROT UR QL STRIP: ABNORMAL
RPR SER QL: NORMAL
SP GR UR STRIP: 1.02 (ref 1–1.03)
URN SPEC COLLECT METH UR: ABNORMAL

## 2023-01-20 ENCOUNTER — TELEPHONE (OUTPATIENT)
Dept: OBSTETRICS AND GYNECOLOGY | Facility: CLINIC | Age: 38
End: 2023-01-20
Payer: COMMERCIAL

## 2023-01-20 DIAGNOSIS — N76.0 BACTERIAL VAGINOSIS: Primary | ICD-10-CM

## 2023-01-20 DIAGNOSIS — B96.89 BACTERIAL VAGINOSIS: Primary | ICD-10-CM

## 2023-01-20 DIAGNOSIS — B37.31 VAGINAL YEAST INFECTION: ICD-10-CM

## 2023-01-20 LAB
BACTERIA UR CULT: NORMAL
BACTERIAL VAGINOSIS DNA: POSITIVE
CANDIDA GLABRATA DNA: NEGATIVE
CANDIDA KRUSEI DNA: NEGATIVE
CANDIDA RRNA VAG QL PROBE: POSITIVE
T PALLIDUM AB SER QL IF: NORMAL
T VAGINALIS RRNA GENITAL QL PROBE: NEGATIVE

## 2023-01-20 RX ORDER — FLUCONAZOLE 150 MG/1
150 TABLET ORAL ONCE
Qty: 2 TABLET | Refills: 0 | Status: SHIPPED | OUTPATIENT
Start: 2023-01-20 | End: 2023-01-20

## 2023-01-20 RX ORDER — METRONIDAZOLE 7.5 MG/G
1 GEL VAGINAL DAILY
Qty: 7 APPLICATOR | Refills: 0 | Status: SHIPPED | OUTPATIENT
Start: 2023-01-20 | End: 2023-01-26 | Stop reason: ALTCHOICE

## 2023-01-20 NOTE — TELEPHONE ENCOUNTER
Spoke with patient.    Discussed positive results for yeast and bacterial vaginosis on one of the vaginal swabs.  Treatment options discussed.  Pt opting for treatment of BV with metrogel.  Diflucan and metrogel sent to pharmacy.      Pt counseled that both yeast and BV are NOT sexually transmitted infections, and it's nothing you got from somebody or could give to somebody. Both infections are just an overgrowth of the normal collette in your vagina.      Yumiko Perry) Denisha Rush, ADELINE  Obstetrics and Gynecology  [facility]

## 2023-01-26 ENCOUNTER — OFFICE VISIT (OUTPATIENT)
Dept: URGENT CARE | Facility: CLINIC | Age: 38
End: 2023-01-26
Payer: COMMERCIAL

## 2023-01-26 VITALS
HEART RATE: 86 BPM | BODY MASS INDEX: 25.4 KG/M2 | HEIGHT: 62 IN | RESPIRATION RATE: 18 BRPM | TEMPERATURE: 99 F | WEIGHT: 138 LBS | DIASTOLIC BLOOD PRESSURE: 73 MMHG | OXYGEN SATURATION: 96 % | SYSTOLIC BLOOD PRESSURE: 102 MMHG

## 2023-01-26 DIAGNOSIS — H69.93 DYSFUNCTION OF BOTH EUSTACHIAN TUBES: ICD-10-CM

## 2023-01-26 DIAGNOSIS — J02.9 SORE THROAT: ICD-10-CM

## 2023-01-26 DIAGNOSIS — J06.9 VIRAL URI: Primary | ICD-10-CM

## 2023-01-26 LAB
CTP QC/QA: YES
CTP QC/QA: YES
MOLECULAR STREP A: NEGATIVE
SARS-COV-2 AG RESP QL IA.RAPID: NEGATIVE

## 2023-01-26 PROCEDURE — 1160F PR REVIEW ALL MEDS BY PRESCRIBER/CLIN PHARMACIST DOCUMENTED: ICD-10-PCS | Mod: CPTII,S$GLB,, | Performed by: PHYSICIAN ASSISTANT

## 2023-01-26 PROCEDURE — 3078F PR MOST RECENT DIASTOLIC BLOOD PRESSURE < 80 MM HG: ICD-10-PCS | Mod: CPTII,S$GLB,, | Performed by: PHYSICIAN ASSISTANT

## 2023-01-26 PROCEDURE — 87811 SARS CORONAVIRUS 2 ANTIGEN POCT, MANUAL READ: ICD-10-PCS | Mod: QW,S$GLB,, | Performed by: PHYSICIAN ASSISTANT

## 2023-01-26 PROCEDURE — 3074F PR MOST RECENT SYSTOLIC BLOOD PRESSURE < 130 MM HG: ICD-10-PCS | Mod: CPTII,S$GLB,, | Performed by: PHYSICIAN ASSISTANT

## 2023-01-26 PROCEDURE — 87651 POCT STREP A MOLECULAR: ICD-10-PCS | Mod: QW,S$GLB,, | Performed by: PHYSICIAN ASSISTANT

## 2023-01-26 PROCEDURE — 3008F PR BODY MASS INDEX (BMI) DOCUMENTED: ICD-10-PCS | Mod: CPTII,S$GLB,, | Performed by: PHYSICIAN ASSISTANT

## 2023-01-26 PROCEDURE — 1160F RVW MEDS BY RX/DR IN RCRD: CPT | Mod: CPTII,S$GLB,, | Performed by: PHYSICIAN ASSISTANT

## 2023-01-26 PROCEDURE — 87811 SARS-COV-2 COVID19 W/OPTIC: CPT | Mod: QW,S$GLB,, | Performed by: PHYSICIAN ASSISTANT

## 2023-01-26 PROCEDURE — 99213 PR OFFICE/OUTPT VISIT, EST, LEVL III, 20-29 MIN: ICD-10-PCS | Mod: S$GLB,,, | Performed by: PHYSICIAN ASSISTANT

## 2023-01-26 PROCEDURE — 1159F MED LIST DOCD IN RCRD: CPT | Mod: CPTII,S$GLB,, | Performed by: PHYSICIAN ASSISTANT

## 2023-01-26 PROCEDURE — 87651 STREP A DNA AMP PROBE: CPT | Mod: QW,S$GLB,, | Performed by: PHYSICIAN ASSISTANT

## 2023-01-26 PROCEDURE — 1159F PR MEDICATION LIST DOCUMENTED IN MEDICAL RECORD: ICD-10-PCS | Mod: CPTII,S$GLB,, | Performed by: PHYSICIAN ASSISTANT

## 2023-01-26 PROCEDURE — 3078F DIAST BP <80 MM HG: CPT | Mod: CPTII,S$GLB,, | Performed by: PHYSICIAN ASSISTANT

## 2023-01-26 PROCEDURE — 3008F BODY MASS INDEX DOCD: CPT | Mod: CPTII,S$GLB,, | Performed by: PHYSICIAN ASSISTANT

## 2023-01-26 PROCEDURE — 3074F SYST BP LT 130 MM HG: CPT | Mod: CPTII,S$GLB,, | Performed by: PHYSICIAN ASSISTANT

## 2023-01-26 PROCEDURE — 99213 OFFICE O/P EST LOW 20 MIN: CPT | Mod: S$GLB,,, | Performed by: PHYSICIAN ASSISTANT

## 2023-01-26 RX ORDER — FLUTICASONE PROPIONATE 50 MCG
1 SPRAY, SUSPENSION (ML) NASAL DAILY
Qty: 16 G | Refills: 0 | Status: SHIPPED | OUTPATIENT
Start: 2023-01-26 | End: 2023-02-02

## 2023-01-26 RX ORDER — CETIRIZINE HYDROCHLORIDE 10 MG/1
10 TABLET ORAL DAILY
Qty: 30 TABLET | Refills: 0 | Status: SHIPPED | OUTPATIENT
Start: 2023-01-26 | End: 2024-02-01

## 2023-01-26 NOTE — PROGRESS NOTES
Subjective:       Patient ID: Hillary Fountain is a 37 y.o. female.    Vitals:  vitals were not taken for this visit.     Chief Complaint: Sore Throat    This is a 37 y.o. female who presents today with a chief complaint of  sore throat and loss of voice.      Sore Throat   This is a new problem.     HENT:  Positive for sore throat.      Objective:      Physical Exam      Assessment:       No diagnosis found.      Plan:         There are no diagnoses linked to this encounter.

## 2023-01-26 NOTE — PATIENT INSTRUCTIONS
"                                                         Will call with covid results.   If your condition worsens or fails to improve we recommend that you receive another evaluation at the urgent care/ER immediately or contact your PCP to discuss your concerns. You must understand that you've received an urgent care treatment only and that you may be released before all your medical problems are known or treated. You the patient will arrange for follouwp care as instructed.   If we discussed that I think your illness is viral, it will not respond to antibiotics and will last 10-14 days.      Flonase (fluticasone) is a nasal spray which is available over the counter and may help with your symptoms.   Zyrtec D, Claritin D or Allegra D can also help with symptoms of congestion and drainage.   If you have hypertension avoid using the "D" which is the decongestant   If you just have drainage you can take plain zyrtec, claritin or allegra   If you just have a congested feeling you can take pseudoephedrine (unless you have high blood pressure) which you have to sign for behind the counter. Do not buy the phenylephrine which is on the shelf as it is not effective   Rest and fluids are also important.     Tylenol or ibuprofen can also be used as directed for pain unless you have an allergy to them or medical condition such as stomach ulcers, kidney or liver disease or blood thinners etc for which you should not be taking these type of medications.   If you are flying in the next few days Afrin nose drops for the airplane flight upon take off and landing may help. Other than at those times refrain from using afrin.       "

## 2023-01-26 NOTE — PROGRESS NOTES
"Subjective:       Patient ID: Hillary Fountain is a 37 y.o. female.    Vitals:  height is 5' 2" (1.575 m) and weight is 62.6 kg (138 lb). Her oral temperature is 99 °F (37.2 °C). Her blood pressure is 102/73 and her pulse is 86. Her respiration is 18 and oxygen saturation is 96%.     Chief Complaint: Sore Throat    This is a 37 y.o. female who presents today with a chief complaint of sorer throat and loss of voice. Patient symptoms started on this morning. Patient drank thera flu tea for sore throat.      Sore Throat   This is a new problem. The current episode started today. The problem has been unchanged. The pain is worse on the left side. There has been no fever. The pain is at a severity of 8/10. The pain is severe. Associated symptoms include congestion, coughing, ear pain and a hoarse voice. Pertinent negatives include no abdominal pain, diarrhea, drooling, ear discharge, headaches, neck pain, shortness of breath, trouble swallowing or vomiting. Treatments tried: thera flu tea. The treatment provided no relief.     Constitution: Positive for chills and fatigue. Negative for appetite change, sweating, fever, unexpected weight change and generalized weakness.   HENT:  Positive for ear pain, congestion, postnasal drip, sinus pressure and sore throat. Negative for ear discharge, hearing loss, dental problem, drooling, mouth sores, facial swelling, sinus pain, trouble swallowing and voice change.    Neck: Positive for painful lymph nodes. Negative for neck pain and neck stiffness.   Cardiovascular:  Negative for chest pain and sob on exertion.   Eyes:  Negative for eye discharge and eye itching.   Respiratory:  Positive for cough. Negative for chest tightness, sputum production, shortness of breath and wheezing.    Gastrointestinal:  Negative for abdominal pain, nausea, vomiting, constipation and diarrhea.   Musculoskeletal:  Negative for pain, muscle cramps and muscle ache.   Skin:  Negative for color change, " pale and rash.   Neurological:  Negative for dizziness, headaches, disorientation and altered mental status.   Hematologic/Lymphatic: Positive for swollen lymph nodes. Negative for easy bruising/bleeding. Does not bruise/bleed easily.   Psychiatric/Behavioral:  Negative for altered mental status, disorientation and confusion.    Past Medical History:   Diagnosis Date    Concussion     H/O concussion     History of urinary calculi     Kidney infection     kidney stones aand kidney infection 2012/and 13    Kidney stones     Thoracic outlet syndrome        Past Surgical History:   Procedure Laterality Date    BREAST AUGMENTATION       SECTION  2004    Boy     SECTION  2010    Boy     SECTION  2015    Boy     SECTION  2018    Girl    CHOLECYSTECTOMY  2012    CYSTOSCOPY W/ RETROGRADES Bilateral 2019    Procedure: CYSTOSCOPY, WITH RETROGRADE PYELOGRAM;  Surgeon: Vipul Sharma MD;  Location: [facility] OR [address] [place] [address];  Service: Urology;  Laterality: Bilateral;    DILATION AND CURETTAGE OF UTERUS      TAB    EPIDURAL STEROID INJECTION N/A 5/3/2022    Procedure: INJECTION, STEROID, EPIDURAL, C7-T1;  Surgeon: Sharon Gale MD;  Location: [facility];  Service: Pain Management;  Laterality: N/A;    URETEROSCOPY Right 2019    Procedure: URETEROSCOPY;  Surgeon: Vipul Sharma MD;  Location: [facility] OR [address] [place] [address];  Service: Urology;  Laterality: Right;  1hr       Family History   Problem Relation Age of Onset    Diabetes Maternal Grandfather     Diabetes Father     Hypertension Father     Anemia Mother     Diabetes Paternal Grandfather     Dementia Paternal Grandmother     No Known Problems Maternal Grandmother     No Known Problems Sister     No Known Problems Sister     Breast cancer Neg Hx     Colon cancer Neg Hx     Ovarian cancer Neg Hx     Stroke Neg Hx     Cancer Neg Hx     Amblyopia Neg Hx     Blindness Neg Hx     Cataracts Neg Hx     Glaucoma Neg Hx      Macular degeneration Neg Hx     Retinal detachment Neg Hx     Strabismus Neg Hx        Social History     Socioeconomic History    Marital status:    Tobacco Use    Smoking status: Never    Smokeless tobacco: Never   Substance and Sexual Activity    Alcohol use: No    Drug use: No    Sexual activity: Yes     Partners: Male     Birth control/protection: None     Comment: :        Current Outpatient Medications   Medication Sig Dispense Refill    ibuprofen (ADVIL,MOTRIN) 800 MG tablet Take 1 tablet (800 mg total) by mouth 3 (three) times daily. 60 tablet 0    cetirizine (ZYRTEC) 10 MG tablet Take 1 tablet (10 mg total) by mouth once daily. 30 tablet 0    fluticasone propionate (FLONASE) 50 mcg/actuation nasal spray 1 spray (50 mcg total) by Each Nostril route once daily. for 7 days 16 g 0     Current Facility-Administered Medications   Medication Dose Route Frequency Provider Last Rate Last Admin    levonorgestreL 20 mcg/24 hours (6 yrs) 52 mg IUD 1 Intra Uterine Device  1 Intra Uterine Device Intrauterine  Tanisha Salmeron MD   1 Intra Uterine Device at 04/28/21 1115       Review of patient's allergies indicates:   Allergen Reactions    Lactose     Ciprofloxacin Nausea Only and Other (See Comments)     Patient has joint pain.         Objective:      Physical Exam   Constitutional: She is oriented to person, place, and time. She appears well-developed. She is cooperative.  Non-toxic appearance. She does not appear ill. No distress. normal  HENT:   Head: Normocephalic and atraumatic.   Ears:   Right Ear: Hearing, external ear and ear canal normal. Tympanic membrane is not injected, not erythematous, not retracted and not bulging. A middle ear effusion is present. impacted cerumen  Left Ear: Hearing, external ear and ear canal normal. Tympanic membrane is not injected, not erythematous, not retracted and not bulging. A middle ear effusion is present. impacted cerumen  Nose: Rhinorrhea and congestion  present. No mucosal edema or nasal deformity. No epistaxis. Right sinus exhibits no maxillary sinus tenderness and no frontal sinus tenderness. Left sinus exhibits no maxillary sinus tenderness and no frontal sinus tenderness.   Mouth/Throat: Uvula is midline and mucous membranes are normal. Mucous membranes are moist. No trismus in the jaw. Normal dentition. No uvula swelling. Posterior oropharyngeal erythema present. No oropharyngeal exudate or posterior oropharyngeal edema.   Eyes: Conjunctivae and lids are normal. Right eye exhibits no discharge. Left eye exhibits no discharge. No scleral icterus.   Neck: Trachea normal and phonation normal. Neck supple. No edema present. No erythema present. No neck rigidity present.   Cardiovascular: Normal rate, regular rhythm, normal heart sounds and normal pulses.   No murmur heard.Exam reveals no gallop.   Pulmonary/Chest: Effort normal and breath sounds normal. No stridor. No respiratory distress. She has no decreased breath sounds. She has no wheezes. She has no rhonchi. She has no rales.   Abdominal: Normal appearance.   Musculoskeletal:      Cervical back: She exhibits no tenderness.   Lymphadenopathy:     She has cervical adenopathy.   Neurological: She is alert and oriented to person, place, and time. She displays no weakness. She exhibits normal muscle tone. Coordination normal.   Skin: Skin is warm, dry, intact, not diaphoretic, not pale and no rash.   Psychiatric: Her speech is normal and behavior is normal. Mood, judgment and thought content normal.   Nursing note and vitals reviewed.  Results for orders placed or performed in visit on 01/26/23   POCT Strep A, Molecular   Result Value Ref Range    Molecular Strep A, POC Negative Negative     Acceptable Yes    SARS Coronavirus 2 Antigen, POCT Manual Read   Result Value Ref Range    SARS Coronavirus 2 Antigen Negative Negative     Acceptable Yes            Assessment:       1. Viral  "URI    2. Sore throat    3. Dysfunction of both eustachian tubes          Plan:       Results reviewed  Viral URI    Sore throat  -     POCT Strep A, Molecular  -     SARS Coronavirus 2 Antigen, POCT Manual Read    Dysfunction of both eustachian tubes  -     fluticasone propionate (FLONASE) 50 mcg/actuation nasal spray; 1 spray (50 mcg total) by Each Nostril route once daily. for 7 days  Dispense: 16 g; Refill: 0  -     cetirizine (ZYRTEC) 10 MG tablet; Take 1 tablet (10 mg total) by mouth once daily.  Dispense: 30 tablet; Refill: 0         Patient Instructions                                                            Will call with covid results.   If your condition worsens or fails to improve we recommend that you receive another evaluation at the urgent care/ER immediately or contact your PCP to discuss your concerns. You must understand that you've received an urgent care treatment only and that you may be released before all your medical problems are known or treated. You the patient will arrange for follw care as instructed.   If we discussed that I think your illness is viral, it will not respond to antibiotics and will last 10-14 days.      Flonase (fluticasone) is a nasal spray which is available over the counter and may help with your symptoms.   Zyrtec D, Claritin D or Allegra D can also help with symptoms of congestion and drainage.   If you have hypertension avoid using the "D" which is the decongestant   If you just have drainage you can take plain zyrtec, claritin or allegra   If you just have a congested feeling you can take pseudoephedrine (unless you have high blood pressure) which you have to sign for behind the counter. Do not buy the phenylephrine which is on the shelf as it is not effective   Rest and fluids are also important.     Tylenol or ibuprofen can also be used as directed for pain unless you have an allergy to them or medical condition such as stomach ulcers, kidney or liver " disease or blood thinners etc for which you should not be taking these type of medications.   If you are flying in the next few days Afrin nose drops for the airplane flight upon take off and landing may help. Other than at those times refrain from using afrin.

## 2023-02-10 ENCOUNTER — CLINICAL SUPPORT (OUTPATIENT)
Dept: REHABILITATION | Facility: HOSPITAL | Age: 38
End: 2023-02-10
Attending: SURGERY
Payer: COMMERCIAL

## 2023-02-10 DIAGNOSIS — G54.0 NEUROGENIC THORACIC OUTLET SYNDROME OF RIGHT BRACHIAL PLEXUS: Primary | ICD-10-CM

## 2023-02-10 PROCEDURE — 97140 MANUAL THERAPY 1/> REGIONS: CPT | Mod: PO

## 2023-02-10 PROCEDURE — 97110 THERAPEUTIC EXERCISES: CPT | Mod: PO

## 2023-02-10 NOTE — PROGRESS NOTES
Hold hydrocodone and baclofen   Begin meds as prescribed   Drink at least 64oz of water daily   Apply ice and biofreeze to back   Follow up with your pcp    [facility]   Physical Therapy Treatment Note     Name: Hillary Fountain  Clinic Number: [id number]    Therapy Diagnosis:   Encounter Diagnosis   Name Primary?    Neurogenic thoracic outlet syndrome of right brachial plexus Yes     Physician: CHANELLE Isaac II, *    Visit Date: 2/10/2023    Physician Orders: PT Eval and Treat   Medical Diagnosis from Referral: G54.0 (ICD-10-CM) - Neurogenic thoracic outlet syndrome of right brachial plexus  Evaluation Date: 1/9/2023  Authorization Period Expiration: 12/22/2023  Plan of Care Expiration: 3/20/2023   Progress Note Due: 2/9/2023  Visit # / Visits authorized: 1/ 12  FOTO: 1/TBD     Precautions: Standard     PTA Visit #: 0/5     Time In: 9:15 am  Time Out: 10:01 am  Total Billable Time: 46 minutes    SUBJECTIVE     Pt reports: her hand is numb today upon arrival.  She was compliant with home exercise program.  Response to previous treatment: alison eval well  Functional change: ongoing    Pain: 3/10 hand, 0/10 shoulder   Location: hand numbness     OBJECTIVE     Objective Measures updated at progress report unless specified.     Treatment     Hillary received the treatments listed below:      Pt received therapeutic exercises to develop strength and ROM for 23 minutes including:  UBE 2/2 (difficulty with hand numbness)  Self 1st rib mob with strap x30 deep breaths   Median nerve glide - 2x10   Ulnar nerve glides - 2x10    Pt received the following manual therapy techniques: Joint mobilizations and Soft tissue Mobilization were applied to the: cervical spine, 1st rib for 23 minutes, including:  Cervical Down glides C4-7 Grades 3-4 (increased symptoms)  Cervical manual traction (relieves symptoms)   Trial run with manual traction unit - 15 psi (increases symptoms)   (We then returned to Cervical manual traction and she reports increased symptoms)   (1st rib mobilizations Grade 1 were not tolerated)        Patient Education and Home Exercises      Home Exercises Provided and Patient Education Provided     Education provided:   - HEP, POC, answered patient questions      Written Home Exercises Provided: yes. Exercises were reviewed and Hillary was able to demonstrate them prior to the end of the session.  Hillary demonstrated good  understanding of the education provided. See EMR under Patient Instructions for exercises provided during therapy sessions    ASSESSMENT     Patient with poor tolerance to session today. Only able to relieve symptoms with manual cervical traction, but when we returned to this technique, she reported increased pain. She is very hypersensitive and unable to tolerate 1st rib mobs or cervical glides. This case does appear to be consistent with thoracic outlet. However, this patient likely demonstrates a large degrees of central sensitization as evidenced by hypersensitivity and lack of consistency with her symptoms.     Hillary Is not progressing well towards her goals.   Pt prognosis is Fair.     Pt will continue to benefit from skilled outpatient physical therapy to address the deficits listed in the problem list box on initial evaluation, provide pt/family education and to maximize pt's level of independence in the home and community environment.     Pt's spiritual, cultural and educational needs considered and pt agreeable to plan of care and goals.     Anticipated barriers to physical therapy: chronicity of pain    Goals:  Short Term Goals: 4 weeks   1. Patient demonstrates independence with HEP.   2. Patient demonstrates independence with Postural Awareness.   3. Patient will report baseline pain <4/10.  4. Patient will improver cervical ROM by 10-15%.     Long Term Goals: 8 weeks   1. Patient will reports <1 sleep disruption secondary to pain and numbness and tingling.   2. Patient will be able to sit for 4 hours with pain less than 2/10 in order to improve tolerance to work activities.   3. Patient will report resolution of  right upper extremity radiculopathy for 50% of the day.   4. Patient will demonstrate independence with advanced home exercise program for self management of condition.     PLAN     Con POC    Tenzin Fisher, PT

## 2023-02-16 ENCOUNTER — CLINICAL SUPPORT (OUTPATIENT)
Dept: REHABILITATION | Facility: HOSPITAL | Age: 38
End: 2023-02-16
Attending: SURGERY
Payer: COMMERCIAL

## 2023-02-16 DIAGNOSIS — G54.0 NEUROGENIC THORACIC OUTLET SYNDROME OF RIGHT BRACHIAL PLEXUS: Primary | ICD-10-CM

## 2023-02-16 PROCEDURE — 97140 MANUAL THERAPY 1/> REGIONS: CPT | Mod: PO

## 2023-02-16 PROCEDURE — 97110 THERAPEUTIC EXERCISES: CPT | Mod: PO

## 2023-02-16 NOTE — PROGRESS NOTES
"[facility]   Physical Therapy Treatment Note     Name: Hillary Fountain  Clinic Number: [id number]    Therapy Diagnosis:   Encounter Diagnosis   Name Primary?    Neurogenic thoracic outlet syndrome of right brachial plexus Yes     Physician: CHANELLE Isaac II, *    Visit Date: 2/16/2023    Physician Orders: PT Eval and Treat   Medical Diagnosis from Referral: G54.0 (ICD-10-CM) - Neurogenic thoracic outlet syndrome of right brachial plexus  Evaluation Date: 1/9/2023  Authorization Period Expiration: 12/22/2023  Plan of Care Expiration: 3/20/2023   Progress Note Due: 3/202/2023  Visit # / Visits authorized: 2 / 12  FOTO: 1/TBD     Precautions: Standard     PTA Visit #: 0/5     Time In: 2:45pm  Time Out: 3:30pm  Total Billable Time: 40 minutes    SUBJECTIVE     Pt reports: she called out of work today because her symptoms were so bad. She reports that her right arm and hand were very swollen and that she is seeing "new veins". She reports her numbness is getting worse. She reports worsening in her right 3 lesser fingers - its getting so bad she can't hold onto objects. She had a hard time with therapy last week but it did not have any adverse effects. She took a muscle relaxer to see if that would help her symptoms - no improvement. She reports that the home exercise with 1st rib mobs helps - decreases symptoms.     She was compliant with home exercise program.  Response to previous treatment: alison eval well  Functional change: ongoing    Pain: 5/10 right forearm and right shoulder pain, and low back pain.    OBJECTIVE     Objective Measures updated at progress report unless specified.       CERVICAL ACTIVE RANGE OF MOTION   Flexion 25 deg    Extension 40 deg   Right Side Bend 35 deg   Left Side Bend 35 deg    Right Rotation 55 deg    Left Rotation 60 deg    Craniocervical Angle 25 deg      Denies all pain with cervical mobility   Treatment     Hillary received the treatments listed below:  " "    Pt received therapeutic exercises to develop strength and ROM for 23 minutes including:  Chin Tuck 2x10x5"  Supine Cervical Rotation  with Chin Tuck 2x10x5" - unable to preform to left secondary to pain.  Pec Stretch over 1/2 foam roller x2 minutes - no symptom reproduction  Levator scapulae Stretch 3x30s - very unsettled and fidgety   Upper trapezius stretch 3x30s - very unsettled and fidgety   Scapular Retractions x20   Median nerve glide - 2x10 - not today  Ulnar nerve glides - 2x10 - not today     Pt received the following manual therapy techniques: Joint mobilizations and Soft tissue Mobilization were applied to the: cervical spine, 1st rib for 20 minutes, including:  Cervical Down glides C4-7 Grades 3-4  Cervical manual traction (relieves symptoms)   1st rib mobilizations Grade 1 were not tolerated    Patient Education and Home Exercises     Home Exercises Provided and Patient Education Provided     Education provided:   - HEP, POC, answered patient questions      Written Home Exercises Provided: yes. Exercises were reviewed and Hillary was able to demonstrate them prior to the end of the session.  Hillary demonstrated good  understanding of the education provided. See EMR under Patient Instructions for exercises provided during therapy sessions    ASSESSMENT   Ms. Fountain has attended 1 session since initial evaluation to address diagnosis on neurogenic thoracic outlet syndrome. Patient has canceled 4 visits since initial evaluation - patient reports this is due to illness. Patient reports minimal improvement since starting therapy but has only had 1 formal session with intervention. Patient able to tolerate session today with minimal symptom exacerbation. Demonstrates moderate improvement in cervical ROM and able to complete pain free. Patient continues to reports radicular right upper extremity symptoms that do not fully correlate with thoracic outlet pathology. Radicular and pain symptoms subjectively " change frequently within and between visits. Physical therapy to continue per plan of care and refer back to physician if progress remains variable or stagnant.     Hillary Is not progressing well towards her goals.   Pt prognosis is Fair.     Pt will continue to benefit from skilled outpatient physical therapy to address the deficits listed in the problem list box on initial evaluation, provide pt/family education and to maximize pt's level of independence in the home and community environment.     Pt's spiritual, cultural and educational needs considered and pt agreeable to plan of care and goals.     Anticipated barriers to physical therapy: chronicity of pain    Goals:  Short Term Goals: 4 weeks   1. Patient demonstrates independence with HEP. - goal progressing 2/16/2023  2. Patient demonstrates independence with Postural Awareness.  - goal progressing 2/16/2023  3. Patient will report baseline pain <4/10.  - goal progressing 2/16/2023  4. Patient will improve cervical ROM by 10-15%.- goal progressing 2/16/2023     Long Term Goals: 8 weeks   1. Patient will reports <1 sleep disruption secondary to pain and numbness and tingling. - goal progressing 2/16/2023  2. Patient will be able to sit for 4 hours with pain less than 2/10 in order to improve tolerance to work activities. - goal progressing 2/16/2023  3. Patient will report resolution of right upper extremity radiculopathy for 50% of the day. - goal progressing 2/16/2023  4. Patient will demonstrate independence with advanced home exercise program for self management of condition. - goal progressing 2/16/2023    PLAN     Con ORALIA Lucas, PT

## 2023-02-24 ENCOUNTER — CLINICAL SUPPORT (OUTPATIENT)
Dept: REHABILITATION | Facility: HOSPITAL | Age: 38
End: 2023-02-24
Attending: SURGERY
Payer: COMMERCIAL

## 2023-02-24 DIAGNOSIS — G54.0 NEUROGENIC THORACIC OUTLET SYNDROME OF RIGHT BRACHIAL PLEXUS: Primary | ICD-10-CM

## 2023-02-24 PROCEDURE — 97140 MANUAL THERAPY 1/> REGIONS: CPT | Mod: PO

## 2023-02-24 PROCEDURE — 97110 THERAPEUTIC EXERCISES: CPT | Mod: PO

## 2023-02-24 NOTE — PROGRESS NOTES
"[facility]   Physical Therapy Treatment Note     Name: Hillary Hughes  Clinic Number: [id number]    Therapy Diagnosis:   Encounter Diagnosis   Name Primary?    Neurogenic thoracic outlet syndrome of right brachial plexus Yes     Physician: CHANELLE Isaac II, *    Visit Date: 2/24/2023    Physician Orders: PT Eval and Treat   Medical Diagnosis from Referral: G54.0 (ICD-10-CM) - Neurogenic thoracic outlet syndrome of right brachial plexus  Evaluation Date: 1/9/2023  Authorization Period Expiration: 12/22/2023  Plan of Care Expiration: 3/20/2023   Progress Note Due: 3/202/2023  Visit # / Visits authorized: 2 / 12  FOTO: 1/TBD     Precautions: Standard     PTA Visit #: 0/5     Time In: 2:30pm  Time Out: 3:45pm  Total Billable Time: 45 minutes    SUBJECTIVE     Pt reports: she has been having a good week. She feels like she hasn't had nerve pain or swelling. She has some discomfort over her first rib on the right side.    She was compliant with home exercise program.  Response to previous treatment: alison eval well  Functional change: ongoing    Pain: 2/10 right shoulder pain    OBJECTIVE     Objective Measures updated at progress report unless specified.     Treatment     Hillary received the treatments listed below:      Pt received therapeutic exercises to develop strength and ROM for 20 minutes including:  Chin Tuck 2x10x5"  Supine Cervical Rotation  with Chin Tuck 2x10x5" - unable to preform to right secondary to pain - last week unable to left   Pec Stretch over 1/2 foam roller x2 minutes - no symptom reproduction  Thoracic Extension over Chair 15x3" - discontinued due to pain  Open Book Hand across Chest 15x5" - discontinued due to pain  Levator scapulae Stretch 3x30s - not preformed  Upper trapezius stretch 3x30s - not preformed  Anterior Scalenes Stretch 3x30s - not preformed  Scapular Retractions x20     Not Today  Median nerve glide - 2x10 - not today  Ulnar nerve glides - 2x10 - " not today     Pt received the following manual therapy techniques: Joint mobilizations and Soft tissue Mobilization were applied to the: cervical spine, 1st rib for 10 minutes, including:  Cervical Down glides C4-7 Grades 3-4  Cervical manual traction (relieves symptoms)   1st rib mobilizations Grade 1   Thoracic CPA grade 1-2 tolerated T4-7 - not tolerated T1-3    Patient Education and Home Exercises     Home Exercises Provided and Patient Education Provided     Education provided:   - HEP, POC, answered patient questions      Written Home Exercises Provided: yes. Exercises were reviewed and Hillary was able to demonstrate them prior to the end of the session.  Hillary demonstrated good  understanding of the education provided. See EMR under Patient Instructions for exercises provided during therapy sessions    ASSESSMENT   Session ended early due to severe pain levels. Patient reported cold sensation running from her shoulder to her fingers. Patient provided time to let pain settle but no change was made. Progressions to further mobility and progress strengthening not made to severity. Will continue to assess appropriateness of physical therapy.    Hillary Is not progressing well towards her goals.   Pt prognosis is Fair.     Pt will continue to benefit from skilled outpatient physical therapy to address the deficits listed in the problem list box on initial evaluation, provide pt/family education and to maximize pt's level of independence in the home and community environment.     Pt's spiritual, cultural and educational needs considered and pt agreeable to plan of care and goals.     Anticipated barriers to physical therapy: chronicity of pain    Goals:  Short Term Goals: 4 weeks   1. Patient demonstrates independence with HEP. - goal progressing 2/16/2023  2. Patient demonstrates independence with Postural Awareness.  - goal progressing 2/16/2023  3. Patient will report baseline pain <4/10.  - goal progressing  2/16/2023  4. Patient will improve cervical ROM by 10-15%.- goal progressing 2/16/2023     Long Term Goals: 8 weeks   1. Patient will reports <1 sleep disruption secondary to pain and numbness and tingling. - goal progressing 2/16/2023  2. Patient will be able to sit for 4 hours with pain less than 2/10 in order to improve tolerance to work activities. - goal progressing 2/16/2023  3. Patient will report resolution of right upper extremity radiculopathy for 50% of the day. - goal progressing 2/16/2023  4. Patient will demonstrate independence with advanced home exercise program for self management of condition. - goal progressing 2/16/2023    PLAN     Con POC    Hoa Lucas, PT

## 2023-03-03 ENCOUNTER — CLINICAL SUPPORT (OUTPATIENT)
Dept: REHABILITATION | Facility: HOSPITAL | Age: 38
End: 2023-03-03
Attending: SURGERY
Payer: COMMERCIAL

## 2023-03-03 DIAGNOSIS — G54.0 NEUROGENIC THORACIC OUTLET SYNDROME OF RIGHT BRACHIAL PLEXUS: Primary | ICD-10-CM

## 2023-03-03 PROCEDURE — 97110 THERAPEUTIC EXERCISES: CPT | Mod: PO

## 2023-03-03 NOTE — PROGRESS NOTES
"[facility]   Physical Therapy Treatment Note     Name: Hillary Hughes  Clinic Number: [id number]    Therapy Diagnosis:   Encounter Diagnosis   Name Primary?    Neurogenic thoracic outlet syndrome of right brachial plexus Yes     Physician: CHANELLE Isaac II, *    Visit Date: 3/3/2023    Physician Orders: PT Eval and Treat   Medical Diagnosis from Referral: G54.0 (ICD-10-CM) - Neurogenic thoracic outlet syndrome of right brachial plexus  Evaluation Date: 1/9/2023  Authorization Period Expiration: 12/22/2023  Plan of Care Expiration: 3/20/2023   Progress Note Due: 3/20/2023  Visit # / Visits authorized: 4 / 12  FOTO: 1/TBD     Precautions: Standard     PTA Visit #: 0/5     Time In: 8:40am (patient arrives 10 minutes late)   Time Out: 9:15am  Total Billable Time: 35 minutes    SUBJECTIVE     Pt reports: she was on ibuprofen and muscle relaxer's for 2-3 days after last visit because her pain was so bad. She is able to manage her nerve pain with ibuprofen. She has numbness and tingling this morning in between the webspace's of her fingers. She is having difficulty with sleeping due to right arm and neck pain and numbness and tingling. She feels like she has had some improvement with therapy - she thinks the first rib mobilizations have helped the most. She is now also reports right foot numbness and tingling and swelling for the past 2 days. She felt like she was dragging her right foot in order to walk.     She was compliant with home exercise program.  Response to previous treatment: alison eval well  Functional change: ongoing    Pain: 0/10 right arm numbness     OBJECTIVE     Objective Measures updated at progress report unless specified.     Treatment     Hillary received the treatments listed below:      Pt received therapeutic exercises to develop strength and ROM for 30 minutes including:  Chin Tuck 2x10x5"  Supine Cervical Rotation  with Chin Tuck 2x10x5" - unable to preform to right " "secondary to pain - last week unable to left   Pec Stretch over 1/2 foam roller x2 minutes - no symptom reproduction  1/2 Foam Roller Snow Angles 2x10  Thoracic Extension over Chair 15x3" - discontinued due to pain  Open Book Hand across Chest 15x5"   Levator scapulae Stretch 3x30s - not preformed  Upper trapezius stretch 3x30s - not preformed  Anterior Scalenes Stretch 3x30s - discontinued due to pain.  Scapular Retractions x20     Not Today  Median nerve glide - 2x10 - not today  Ulnar nerve glides - 2x10 - not today     Pt received the following manual therapy techniques: Joint mobilizations and Soft tissue Mobilization were applied to the: cervical spine, 1st rib for 00 minutes, including:  Cervical Down glides C4-7 Grades 3-4  Cervical manual traction (relieves symptoms)   1st rib mobilizations Grade 1   Thoracic CPA grade 1-2 tolerated T4-7 - not tolerated T1-3    Patient Education and Home Exercises     Home Exercises Provided and Patient Education Provided     Education provided:   - HEP, POC, answered patient questions    Written Home Exercises Provided: yes. Exercises were reviewed and Hillary was able to demonstrate them prior to the end of the session.  Hillary demonstrated good  understanding of the education provided. See EMR under Patient Instructions for exercises provided during therapy sessions    ASSESSMENT   Patient continues to display inconsistent s/sx of TOS. Pain and numbness and tingling continue to change each visit. She is now reporting radicular pain in her right lower extremity and swelling. Patient inquired about dry needling services and due to level of irritability and use of medication post visit patient is not a candidate for these services. She inquired about needling along the path of numbness in the arm - patient educated that this is not the purpose or role of dry needling. Patient continues to have poor tolerance to activity with reports of severe increases in pain with general " AROM and stretching. Patient's current status is highly implicated by central sensitization and fear avoidance. If s/sx continue to change next vist will d/c to MD for further examination and physical therapy is not indicated.      Hillary Is not progressing well towards her goals.   Pt prognosis is Fair.     Pt will continue to benefit from skilled outpatient physical therapy to address the deficits listed in the problem list box on initial evaluation, provide pt/family education and to maximize pt's level of independence in the home and community environment.     Pt's spiritual, cultural and educational needs considered and pt agreeable to plan of care and goals.     Anticipated barriers to physical therapy: chronicity of pain    Goals:  Short Term Goals: 4 weeks   1. Patient demonstrates independence with HEP. - goal progressing 2/16/2023  2. Patient demonstrates independence with Postural Awareness.  - goal progressing 2/16/2023  3. Patient will report baseline pain <4/10.  - goal progressing 2/16/2023  4. Patient will improve cervical ROM by 10-15%.- goal progressing 2/16/2023     Long Term Goals: 8 weeks   1. Patient will reports <1 sleep disruption secondary to pain and numbness and tingling. - goal progressing 2/16/2023  2. Patient will be able to sit for 4 hours with pain less than 2/10 in order to improve tolerance to work activities. - goal progressing 2/16/2023  3. Patient will report resolution of right upper extremity radiculopathy for 50% of the day. - goal progressing 2/16/2023  4. Patient will demonstrate independence with advanced home exercise program for self management of condition. - goal progressing 2/16/2023    PLAN     Con POC    Hoa Lucas, PT

## 2023-03-10 ENCOUNTER — CLINICAL SUPPORT (OUTPATIENT)
Dept: REHABILITATION | Facility: HOSPITAL | Age: 38
End: 2023-03-10
Attending: SURGERY
Payer: COMMERCIAL

## 2023-03-10 DIAGNOSIS — G54.0 NEUROGENIC THORACIC OUTLET SYNDROME OF RIGHT BRACHIAL PLEXUS: Primary | ICD-10-CM

## 2023-03-10 PROCEDURE — 97110 THERAPEUTIC EXERCISES: CPT | Mod: PO

## 2023-03-10 NOTE — PROGRESS NOTES
"[facility]   Physical Therapy Treatment Note     Name: Hillary Hughes  Clinic Number: [id number]    Therapy Diagnosis:   Encounter Diagnosis   Name Primary?    Neurogenic thoracic outlet syndrome of right brachial plexus Yes     Physician: CHANELLE Isaac II, *    Visit Date: 3/10/2023    Physician Orders: PT Eval and Treat   Medical Diagnosis from Referral: G54.0 (ICD-10-CM) - Neurogenic thoracic outlet syndrome of right brachial plexus  Evaluation Date: 1/9/2023  Authorization Period Expiration: 12/22/2023  Plan of Care Expiration: 3/20/2023   Progress Note Due: 3/20/2023  Visit # / Visits authorized: 5 / 12  FOTO: 1/TBD     Precautions: Standard     PTA Visit #: 0/5     Time In: 7:50AM (patient arrives late)   Time Out: 8:30am  Total Billable Time: 40 minutes    SUBJECTIVE     Pt reports: she feels like this week is a good week. She hasn't over done it this week. She is still taking ibuprofen to manage pain. She has been working with her job for accommodations. She has requested work from home. She reports that working from home decreases her pain. She has been trying to take more breaks with standing and walking during the day. She is still having numbness and tingling into her right leg - mostly in her right ankle. She reports pain from wearing tennis shoes. She is reports pain in the right upper trapezius and right wrist pain. "Feels like something is punching her".  She had increased numbness and tingling with sitting in the lobby in her right arm.     She was compliant with home exercise program.  Response to previous treatment: alison eval well  Functional change: ongoing    Pain: 0/10 right arm numbness     OBJECTIVE     Objective Measures updated at progress report unless specified.     Treatment     Hillary received the treatments listed below:      Pt received therapeutic exercises to develop strength and ROM for 30 minutes including:  Chin Tuck 2x10x5" - unable to complete due " "to "heart beat sensation"  Supine Cervical Rotation  with Chin Tuck 2x10x5" - unable to complete secondary to pain to left  Pec Stretch over 1/2 foam roller x2 minutes - increased in right wrist pain  1/2 Foam Roller Snow Angles 2x10  Thoracic Extension over Chair 15x3" - discontinued due to pain  Open Book Hand across Chest 15x5"   Levator scapulae Stretch 3x30s   Upper trapezius stretch 3x30s   Anterior Scalenes Stretch 3x30s - discontinued due to pain - regardless of side stretched.   Rows GTB 2x10     Not Today  Median nerve glide - 2x10 - not today  Ulnar nerve glides - 2x10 - not today     Pt received the following manual therapy techniques: Joint mobilizations and Soft tissue Mobilization were applied to the: cervical spine, 1st rib for 00 minutes, including:  Cervical Down glides C4-7 Grades 3-4  Cervical manual traction (relieves symptoms)   1st rib mobilizations Grade 1   Thoracic CPA grade 1-2 tolerated T4-7 - not tolerated T1-3    Patient Education and Home Exercises     Home Exercises Provided and Patient Education Provided     Education provided:   - HEP, POC, answered patient questions    Written Home Exercises Provided: yes. Exercises were reviewed and Hillary was able to demonstrate them prior to the end of the session.  Hillary demonstrated good  understanding of the education provided. See EMR under Patient Instructions for exercises provided during therapy sessions    ASSESSMENT   Patient continues to display inconsistent s/sx of TOS. Continued reports of moving numbness and tingling and inconsistent reports of swelling and feeling of her arm needing to "pop". Right lower extremity numbness and tingling reports continue this week. She does report working from improves her symptoms - this correlates with suspected high anxiety and central sensitization components of the case. Patient to be seen for 1 additional visit and refer back to physician as TOS diagnosis not likely and patient not improving " with therapy.     Hillary Is not progressing well towards her goals.   Pt prognosis is Fair.     Pt will continue to benefit from skilled outpatient physical therapy to address the deficits listed in the problem list box on initial evaluation, provide pt/family education and to maximize pt's level of independence in the home and community environment.     Pt's spiritual, cultural and educational needs considered and pt agreeable to plan of care and goals.     Anticipated barriers to physical therapy: chronicity of pain    Goals:  Short Term Goals: 4 weeks   1. Patient demonstrates independence with HEP. - goal progressing 2/16/2023  2. Patient demonstrates independence with Postural Awareness.  - goal progressing 2/16/2023  3. Patient will report baseline pain <4/10.  - goal progressing 2/16/2023  4. Patient will improve cervical ROM by 10-15%.- goal progressing 2/16/2023     Long Term Goals: 8 weeks   1. Patient will reports <1 sleep disruption secondary to pain and numbness and tingling. - goal progressing 2/16/2023  2. Patient will be able to sit for 4 hours with pain less than 2/10 in order to improve tolerance to work activities. - goal progressing 2/16/2023  3. Patient will report resolution of right upper extremity radiculopathy for 50% of the day. - goal progressing 2/16/2023  4. Patient will demonstrate independence with advanced home exercise program for self management of condition. - goal progressing 2/16/2023    PLAN     Watson Lucas, PT

## 2023-03-17 ENCOUNTER — CLINICAL SUPPORT (OUTPATIENT)
Dept: REHABILITATION | Facility: HOSPITAL | Age: 38
End: 2023-03-17
Attending: SURGERY
Payer: COMMERCIAL

## 2023-03-17 DIAGNOSIS — G54.0 NEUROGENIC THORACIC OUTLET SYNDROME OF RIGHT BRACHIAL PLEXUS: Primary | ICD-10-CM

## 2023-03-17 PROCEDURE — 97110 THERAPEUTIC EXERCISES: CPT | Mod: PO

## 2023-03-17 NOTE — PROGRESS NOTES
[facility]   Physical Therapy Discharge Note     Name: Hillary Hughes  Clinic Number: [id number]    Therapy Diagnosis:   Encounter Diagnosis   Name Primary?    Neurogenic thoracic outlet syndrome of right brachial plexus Yes     Physician: CHANELLE Isaac II, *    Visit Date: 3/17/2023    Physician Orders: PT Eval and Treat   Medical Diagnosis from Referral: G54.0 (ICD-10-CM) - Neurogenic thoracic outlet syndrome of right brachial plexus  Evaluation Date: 1/9/2023  Authorization Period Expiration: 12/22/2023  Plan of Care Expiration: 3/20/2023   Progress Note Due: 3/20/2023  Visit # / Visits authorized: 6 / 12  FOTO: 1/TBD     Precautions: Standard     PTA Visit #: 0/5     Time In: 8:30AM  Time Out: 8:53am  Total Billable Time: 23 minutes    SUBJECTIVE     Pt reports: she feels like there has been no progression. She is no worse no better. She reports she is having right arm pain and discomfort this morning. She is having numbness in the webspace of her thumb and first finger on the right side. She rolled to her right side last night and that was very painful. She feels like she has a heart beat in her right upper trapezius area. She did not sleep well.     She was compliant with home exercise program.  Response to previous treatment: alison eval well  Functional change: ongoing    Pain: 8/10 right arm numbness and right neck    OBJECTIVE     Objective Measures updated at progress report unless specified.         CERVICAL ACTIVE RANGE OF MOTION   Flexion 20 deg    Extension 40 deg   Right Side Bend 25 deg   Left Side Bend 40 deg    Right Rotation 70 deg    Left Rotation 40 deg PAIN   Craniocervical Angle 30 deg      Cervical Mobility - could not assess secondary to pain  Thoracic Mobility - could not assess secondary to pain    Treatment     Hillary received the treatments listed below:      Pt received therapeutic exercises to develop strength and ROM for 23 minutes including:  See  "objective measurements  Discussed home exercise program.     Home exercise program   Chin Tuck 2x10x5" - unable to complete due to "heart beat sensation"  Supine Cervical Rotation  with Chin Tuck 2x10x5" - unable to complete secondary to pain to left  Pec Stretch over 1/2 foam roller x2 minutes - increased in right wrist pain  1/2 Foam Roller Snow Angles 2x10  Thoracic Extension over Chair 15x3" - discontinued due to pain  Open Book Hand across Chest 15x5"   Levator scapulae Stretch 3x30s   Upper trapezius stretch 3x30s   Anterior Scalenes Stretch 3x30s - discontinued due to pain - regardless of side stretched.   Rows GTB 2x10     Not Today  Median nerve glide - 2x10 - not today  Ulnar nerve glides - 2x10 - not today     Pt received the following manual therapy techniques: Joint mobilizations and Soft tissue Mobilization were applied to the: cervical spine, 1st rib for 00 minutes, including:  Cervical Down glides C4-7 Grades 3-4  Cervical manual traction (relieves symptoms)   1st rib mobilizations Grade 1   Thoracic CPA grade 1-2 tolerated T4-7 - not tolerated T1-3    Patient Education and Home Exercises     Home Exercises Provided and Patient Education Provided     Education provided:   - HEP, POC, answered patient questions    Written Home Exercises Provided: yes. Exercises were reviewed and Hillary was able to demonstrate them prior to the end of the session.  Hillary demonstrated good  understanding of the education provided. See EMR under Patient Instructions for exercises provided during therapy sessions    ASSESSMENT   Patient has attended 6 out patient physical therapy visits to address neurogenic TOS. Since starting therapy patient s/sx have greatly fluctuated with inconsistent reports of numbness and tingling and pain. Patient has had very poor tolerance to activity, stretching, ROM and manual therapy. Patient has reported that stress and work greatly influence her s/sx. At this time, the present s/sx seem " to be related to central sensitization and anxiety. No progress has been made with therapy and will be discharged to MD for further assessment and needs.     Hillary Is not progressing well towards her goals.   Pt prognosis is Fair.     Pt will continue to benefit from skilled outpatient physical therapy to address the deficits listed in the problem list box on initial evaluation, provide pt/family education and to maximize pt's level of independence in the home and community environment.     Pt's spiritual, cultural and educational needs considered and pt agreeable to plan of care and goals.     Anticipated barriers to physical therapy: chronicity of pain    Goals:  Short Term Goals: 4 weeks   1. Patient demonstrates independence with HEP. - goal not met 3/17/2023  2. Patient demonstrates independence with Postural Awareness.  - goal not met 3/17/2023  3. Patient will report baseline pain <4/10.  - goal not met 3/17/2023  4. Patient will improve cervical ROM by 10-15%.- goal not met 3/17/2023     Long Term Goals: 8 weeks   1. Patient will reports <1 sleep disruption secondary to pain and numbness and tingling.- goal not met 3/17/2023  2. Patient will be able to sit for 4 hours with pain less than 2/10 in order to improve tolerance to work activities. - goal not met 3/17/2023  3. Patient will report resolution of right upper extremity radiculopathy for 50% of the day. - goal not met 3/17/2023  4. Patient will demonstrate independence with advanced home exercise program for self management of condition. - goal not met 3/17/2023    PLAN     Con ORALIA Lucas, PT

## 2023-03-20 ENCOUNTER — OFFICE VISIT (OUTPATIENT)
Dept: VASCULAR SURGERY | Facility: CLINIC | Age: 38
End: 2023-03-20
Attending: SURGERY
Payer: COMMERCIAL

## 2023-03-20 VITALS
BODY MASS INDEX: 25.15 KG/M2 | DIASTOLIC BLOOD PRESSURE: 68 MMHG | WEIGHT: 136.69 LBS | SYSTOLIC BLOOD PRESSURE: 112 MMHG | TEMPERATURE: 98 F | HEIGHT: 62 IN | HEART RATE: 71 BPM

## 2023-03-20 DIAGNOSIS — R20.0 ANESTHESIA OF SKIN: ICD-10-CM

## 2023-03-20 DIAGNOSIS — R20.0 NUMBNESS AND TINGLING OF RIGHT ARM AND LEG: Primary | ICD-10-CM

## 2023-03-20 DIAGNOSIS — G54.0 NEUROGENIC THORACIC OUTLET SYNDROME OF RIGHT BRACHIAL PLEXUS: ICD-10-CM

## 2023-03-20 DIAGNOSIS — R20.2 NUMBNESS AND TINGLING OF RIGHT ARM AND LEG: Primary | ICD-10-CM

## 2023-03-20 PROCEDURE — 3008F PR BODY MASS INDEX (BMI) DOCUMENTED: ICD-10-PCS | Mod: CPTII,S$GLB,, | Performed by: SURGERY

## 2023-03-20 PROCEDURE — 1159F MED LIST DOCD IN RCRD: CPT | Mod: CPTII,S$GLB,, | Performed by: SURGERY

## 2023-03-20 PROCEDURE — 99999 PR PBB SHADOW E&M-EST. PATIENT-LVL III: ICD-10-PCS | Mod: PBBFAC,,, | Performed by: SURGERY

## 2023-03-20 PROCEDURE — 99213 PR OFFICE/OUTPT VISIT, EST, LEVL III, 20-29 MIN: ICD-10-PCS | Mod: S$GLB,,, | Performed by: SURGERY

## 2023-03-20 PROCEDURE — 99999 PR PBB SHADOW E&M-EST. PATIENT-LVL III: CPT | Mod: PBBFAC,,, | Performed by: SURGERY

## 2023-03-20 PROCEDURE — 3074F PR MOST RECENT SYSTOLIC BLOOD PRESSURE < 130 MM HG: ICD-10-PCS | Mod: CPTII,S$GLB,, | Performed by: SURGERY

## 2023-03-20 PROCEDURE — 3074F SYST BP LT 130 MM HG: CPT | Mod: CPTII,S$GLB,, | Performed by: SURGERY

## 2023-03-20 PROCEDURE — 3078F PR MOST RECENT DIASTOLIC BLOOD PRESSURE < 80 MM HG: ICD-10-PCS | Mod: CPTII,S$GLB,, | Performed by: SURGERY

## 2023-03-20 PROCEDURE — 1159F PR MEDICATION LIST DOCUMENTED IN MEDICAL RECORD: ICD-10-PCS | Mod: CPTII,S$GLB,, | Performed by: SURGERY

## 2023-03-20 PROCEDURE — 3008F BODY MASS INDEX DOCD: CPT | Mod: CPTII,S$GLB,, | Performed by: SURGERY

## 2023-03-20 PROCEDURE — 99213 OFFICE O/P EST LOW 20 MIN: CPT | Mod: S$GLB,,, | Performed by: SURGERY

## 2023-03-20 PROCEDURE — 3078F DIAST BP <80 MM HG: CPT | Mod: CPTII,S$GLB,, | Performed by: SURGERY

## 2023-03-20 RX ORDER — LIDOCAINE 50 MG/G
1 PATCH TOPICAL DAILY
Qty: 30 PATCH | Refills: 0 | Status: SHIPPED | OUTPATIENT
Start: 2023-03-20 | End: 2024-02-01
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
## 2023-03-28 DIAGNOSIS — G54.0 NEUROGENIC THORACIC OUTLET SYNDROME OF RIGHT BRACHIAL PLEXUS: Primary | ICD-10-CM

## 2023-03-29 ENCOUNTER — TELEPHONE (OUTPATIENT)
Dept: PAIN MEDICINE | Facility: CLINIC | Age: 38
End: 2023-03-29
Payer: COMMERCIAL

## 2023-03-29 DIAGNOSIS — M99.51 INTERVERTEBRAL DISC STENOSIS OF NEURAL CANAL OF CERVICAL REGION: Primary | ICD-10-CM

## 2023-04-28 ENCOUNTER — TELEPHONE (OUTPATIENT)
Dept: PAIN MEDICINE | Facility: CLINIC | Age: 38
End: 2023-04-28
Payer: COMMERCIAL

## 2023-04-28 ENCOUNTER — LAB VISIT (OUTPATIENT)
Dept: LAB | Facility: OTHER | Age: 38
End: 2023-04-28
Attending: OBSTETRICS & GYNECOLOGY
Payer: COMMERCIAL

## 2023-04-28 ENCOUNTER — OFFICE VISIT (OUTPATIENT)
Dept: OBSTETRICS AND GYNECOLOGY | Facility: CLINIC | Age: 38
End: 2023-04-28
Payer: COMMERCIAL

## 2023-04-28 VITALS
DIASTOLIC BLOOD PRESSURE: 70 MMHG | WEIGHT: 140.56 LBS | SYSTOLIC BLOOD PRESSURE: 104 MMHG | BODY MASS INDEX: 25.71 KG/M2

## 2023-04-28 DIAGNOSIS — Z12.4 SCREENING FOR CERVICAL CANCER: ICD-10-CM

## 2023-04-28 DIAGNOSIS — Z11.3 SCREENING FOR STDS (SEXUALLY TRANSMITTED DISEASES): ICD-10-CM

## 2023-04-28 DIAGNOSIS — Z30.431 SURVEILLANCE OF INTRAUTERINE CONTRACEPTION: ICD-10-CM

## 2023-04-28 DIAGNOSIS — Z01.419 ENCOUNTER FOR ANNUAL ROUTINE GYNECOLOGICAL EXAMINATION: Primary | ICD-10-CM

## 2023-04-28 LAB
HBV SURFACE AG SERPL QL IA: NORMAL
HCV AB SERPL QL IA: NORMAL
HIV 1+2 AB+HIV1 P24 AG SERPL QL IA: NORMAL

## 2023-04-28 PROCEDURE — 86803 HEPATITIS C AB TEST: CPT | Performed by: OBSTETRICS & GYNECOLOGY

## 2023-04-28 PROCEDURE — 3074F SYST BP LT 130 MM HG: CPT | Mod: CPTII,S$GLB,, | Performed by: OBSTETRICS & GYNECOLOGY

## 2023-04-28 PROCEDURE — 86592 SYPHILIS TEST NON-TREP QUAL: CPT | Performed by: OBSTETRICS & GYNECOLOGY

## 2023-04-28 PROCEDURE — 99395 PREV VISIT EST AGE 18-39: CPT | Mod: S$GLB,,, | Performed by: OBSTETRICS & GYNECOLOGY

## 2023-04-28 PROCEDURE — 36415 COLL VENOUS BLD VENIPUNCTURE: CPT | Performed by: OBSTETRICS & GYNECOLOGY

## 2023-04-28 PROCEDURE — 1160F RVW MEDS BY RX/DR IN RCRD: CPT | Mod: CPTII,S$GLB,, | Performed by: OBSTETRICS & GYNECOLOGY

## 2023-04-28 PROCEDURE — 3078F DIAST BP <80 MM HG: CPT | Mod: CPTII,S$GLB,, | Performed by: OBSTETRICS & GYNECOLOGY

## 2023-04-28 PROCEDURE — 3008F PR BODY MASS INDEX (BMI) DOCUMENTED: ICD-10-PCS | Mod: CPTII,S$GLB,, | Performed by: OBSTETRICS & GYNECOLOGY

## 2023-04-28 PROCEDURE — 87340 HEPATITIS B SURFACE AG IA: CPT | Performed by: OBSTETRICS & GYNECOLOGY

## 2023-04-28 PROCEDURE — 99395 PR PREVENTIVE VISIT,EST,18-39: ICD-10-PCS | Mod: S$GLB,,, | Performed by: OBSTETRICS & GYNECOLOGY

## 2023-04-28 PROCEDURE — 1159F PR MEDICATION LIST DOCUMENTED IN MEDICAL RECORD: ICD-10-PCS | Mod: CPTII,S$GLB,, | Performed by: OBSTETRICS & GYNECOLOGY

## 2023-04-28 PROCEDURE — 3074F PR MOST RECENT SYSTOLIC BLOOD PRESSURE < 130 MM HG: ICD-10-PCS | Mod: CPTII,S$GLB,, | Performed by: OBSTETRICS & GYNECOLOGY

## 2023-04-28 PROCEDURE — 87591 N.GONORRHOEAE DNA AMP PROB: CPT | Performed by: OBSTETRICS & GYNECOLOGY

## 2023-04-28 PROCEDURE — 1159F MED LIST DOCD IN RCRD: CPT | Mod: CPTII,S$GLB,, | Performed by: OBSTETRICS & GYNECOLOGY

## 2023-04-28 PROCEDURE — 99999 PR PBB SHADOW E&M-EST. PATIENT-LVL III: CPT | Mod: PBBFAC,,, | Performed by: OBSTETRICS & GYNECOLOGY

## 2023-04-28 PROCEDURE — 99999 PR PBB SHADOW E&M-EST. PATIENT-LVL III: ICD-10-PCS | Mod: PBBFAC,,, | Performed by: OBSTETRICS & GYNECOLOGY

## 2023-04-28 PROCEDURE — 87389 HIV-1 AG W/HIV-1&-2 AB AG IA: CPT | Performed by: OBSTETRICS & GYNECOLOGY

## 2023-04-28 PROCEDURE — 88175 CYTOPATH C/V AUTO FLUID REDO: CPT | Performed by: OBSTETRICS & GYNECOLOGY

## 2023-04-28 PROCEDURE — 3008F BODY MASS INDEX DOCD: CPT | Mod: CPTII,S$GLB,, | Performed by: OBSTETRICS & GYNECOLOGY

## 2023-04-28 PROCEDURE — 1160F PR REVIEW ALL MEDS BY PRESCRIBER/CLIN PHARMACIST DOCUMENTED: ICD-10-PCS | Mod: CPTII,S$GLB,, | Performed by: OBSTETRICS & GYNECOLOGY

## 2023-04-28 PROCEDURE — 3078F PR MOST RECENT DIASTOLIC BLOOD PRESSURE < 80 MM HG: ICD-10-PCS | Mod: CPTII,S$GLB,, | Performed by: OBSTETRICS & GYNECOLOGY

## 2023-04-28 NOTE — TELEPHONE ENCOUNTER
----- Message from Venessa Gallo sent at 4/28/2023 11:24 AM CDT -----  Regarding: Patient call back  Who called:CARLOS ALBERTO ULRICH [[id number]]    What is the request in detail: Patient is requesting a call back. Pt would like to know if her procedure has been approved.   Please advise.    Can the clinic reply by [facility]? No    Best call back number: [phone]    Additional Information: N/A

## 2023-04-29 LAB
C TRACH DNA SPEC QL NAA+PROBE: NOT DETECTED
N GONORRHOEA DNA SPEC QL NAA+PROBE: NOT DETECTED
RPR SER QL: NORMAL

## 2023-05-01 ENCOUNTER — PATIENT MESSAGE (OUTPATIENT)
Dept: OBSTETRICS AND GYNECOLOGY | Facility: CLINIC | Age: 38
End: 2023-05-01
Payer: COMMERCIAL

## 2023-05-01 ENCOUNTER — TELEPHONE (OUTPATIENT)
Dept: PAIN MEDICINE | Facility: CLINIC | Age: 38
End: 2023-05-01
Payer: COMMERCIAL

## 2023-05-01 NOTE — TELEPHONE ENCOUNTER
Staff informed pt that her procedure is still pending authorization by her insurance company staff informed pt that we will keep her on the scheduled until the morning due to pre service currently working her case pt verbalized understanding and thanked staff for reaching out to her

## 2023-05-02 ENCOUNTER — PROCEDURE VISIT (OUTPATIENT)
Dept: PAIN MEDICINE | Facility: CLINIC | Age: 38
End: 2023-05-02
Payer: COMMERCIAL

## 2023-05-02 VITALS
WEIGHT: 140.44 LBS | SYSTOLIC BLOOD PRESSURE: 109 MMHG | DIASTOLIC BLOOD PRESSURE: 73 MMHG | HEIGHT: 62 IN | HEART RATE: 72 BPM | BODY MASS INDEX: 25.84 KG/M2

## 2023-05-02 DIAGNOSIS — G54.0 NEUROGENIC THORACIC OUTLET SYNDROME OF RIGHT BRACHIAL PLEXUS: ICD-10-CM

## 2023-05-02 DIAGNOSIS — G89.29 CHRONIC RIGHT SHOULDER PAIN: Primary | ICD-10-CM

## 2023-05-02 DIAGNOSIS — M25.511 CHRONIC RIGHT SHOULDER PAIN: Primary | ICD-10-CM

## 2023-05-02 PROCEDURE — 76942 PR U/S GUIDANCE FOR NEEDLE GUIDANCE: ICD-10-PCS | Mod: S$GLB,,, | Performed by: ANESTHESIOLOGY

## 2023-05-02 PROCEDURE — 20552 PR INJECT TRIGGER POINT, 1 OR 2: ICD-10-PCS | Mod: S$GLB,,, | Performed by: ANESTHESIOLOGY

## 2023-05-02 PROCEDURE — 20552 NJX 1/MLT TRIGGER POINT 1/2: CPT | Mod: S$GLB,,, | Performed by: ANESTHESIOLOGY

## 2023-05-02 PROCEDURE — 76942 ECHO GUIDE FOR BIOPSY: CPT | Mod: S$GLB,,, | Performed by: ANESTHESIOLOGY

## 2023-05-02 NOTE — PROCEDURES
Patient Name: Hillary Hughes  MRN: [id number]    INFORMED CONSENT: The procedure, risks, benefits and options were discussed with patient. There are no contraindications to the procedure. The patient expressed understanding and agreed to proceed. The personnel performing the procedure was discussed. I verify that I personally obtained Hillary's consent prior to the start of the procedure and the signed consent can be found on the patient's chart.    Procedure Date: 05/02/2023    Anesthesia: Topical    Pre Procedure diagnosis:   1. Chronic right shoulder pain    2. Neurogenic thoracic outlet syndrome of right brachial plexus      Post-Procedure diagnosis: SAME      Moderate Sedation: None  Sedation time: Less than 15 minutes    PROCEDURE: Right anterior scalene block utilizing ultrasound    TECHNIQUE:   A time-out taken to identify patient and procedure side prior to starting the procedure. The patient was placed in supine position, prepped and draped in the usual sterile fashion using ChloraPrep and sterile towels.  The right anterior scalene muscle lateral and posterior to the sternocleidomastoid was identified under ultrasound guidance.  Under ultrasound guidance a 26-gauge 3.5 inch needle was introduced and followed until it reached the belly of the anterior scalene muscle. After negative aspiration for blood or air, 2 ml of Bupivacaine 0.25% was injected. The patient tolerated the procedure well.    The patient was monitored after the procedure. The patient was given post procedure and discharge instructions to follow at home. The patient was discharged in a stable condition.        Blood Loss: Nill  Specimen: None      Cleo Conde MD    I have reviewed the notes, assessments, and/or procedures performed by fellow, I concur with her/his documentation of Hillary Hughes.    Vitor Contreras MD

## 2023-05-03 ENCOUNTER — PATIENT MESSAGE (OUTPATIENT)
Dept: VASCULAR SURGERY | Facility: CLINIC | Age: 38
End: 2023-05-03
Payer: COMMERCIAL

## 2023-05-05 ENCOUNTER — OFFICE VISIT (OUTPATIENT)
Dept: VASCULAR SURGERY | Facility: CLINIC | Age: 38
End: 2023-05-05
Attending: SURGERY
Payer: COMMERCIAL

## 2023-05-05 VITALS
SYSTOLIC BLOOD PRESSURE: 105 MMHG | WEIGHT: 136.69 LBS | DIASTOLIC BLOOD PRESSURE: 63 MMHG | BODY MASS INDEX: 25.15 KG/M2 | HEART RATE: 64 BPM | HEIGHT: 62 IN | TEMPERATURE: 99 F

## 2023-05-05 DIAGNOSIS — G54.0 NEUROGENIC THORACIC OUTLET SYNDROME OF RIGHT BRACHIAL PLEXUS: Primary | ICD-10-CM

## 2023-05-05 LAB
FINAL PATHOLOGIC DIAGNOSIS: NORMAL
Lab: NORMAL

## 2023-05-05 PROCEDURE — 1159F PR MEDICATION LIST DOCUMENTED IN MEDICAL RECORD: ICD-10-PCS | Mod: CPTII,S$GLB,, | Performed by: SURGERY

## 2023-05-05 PROCEDURE — 99214 PR OFFICE/OUTPT VISIT, EST, LEVL IV, 30-39 MIN: ICD-10-PCS | Mod: S$GLB,,, | Performed by: SURGERY

## 2023-05-05 PROCEDURE — 99999 PR PBB SHADOW E&M-EST. PATIENT-LVL III: CPT | Mod: PBBFAC,,, | Performed by: SURGERY

## 2023-05-05 PROCEDURE — 1159F MED LIST DOCD IN RCRD: CPT | Mod: CPTII,S$GLB,, | Performed by: SURGERY

## 2023-05-05 PROCEDURE — 3078F PR MOST RECENT DIASTOLIC BLOOD PRESSURE < 80 MM HG: ICD-10-PCS | Mod: CPTII,S$GLB,, | Performed by: SURGERY

## 2023-05-05 PROCEDURE — 3074F SYST BP LT 130 MM HG: CPT | Mod: CPTII,S$GLB,, | Performed by: SURGERY

## 2023-05-05 PROCEDURE — 99999 PR PBB SHADOW E&M-EST. PATIENT-LVL III: ICD-10-PCS | Mod: PBBFAC,,, | Performed by: SURGERY

## 2023-05-05 PROCEDURE — 3078F DIAST BP <80 MM HG: CPT | Mod: CPTII,S$GLB,, | Performed by: SURGERY

## 2023-05-05 PROCEDURE — 3074F PR MOST RECENT SYSTOLIC BLOOD PRESSURE < 130 MM HG: ICD-10-PCS | Mod: CPTII,S$GLB,, | Performed by: SURGERY

## 2023-05-05 PROCEDURE — 99214 OFFICE O/P EST MOD 30 MIN: CPT | Mod: S$GLB,,, | Performed by: SURGERY

## 2023-05-05 PROCEDURE — 3008F PR BODY MASS INDEX (BMI) DOCUMENTED: ICD-10-PCS | Mod: CPTII,S$GLB,, | Performed by: SURGERY

## 2023-05-05 PROCEDURE — 3008F BODY MASS INDEX DOCD: CPT | Mod: CPTII,S$GLB,, | Performed by: SURGERY
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
## 2023-05-18 ENCOUNTER — HOSPITAL ENCOUNTER (OUTPATIENT)
Dept: RADIOLOGY | Facility: HOSPITAL | Age: 38
Discharge: HOME | End: 2023-05-18
Attending: SURGERY
Payer: COMMERCIAL

## 2023-05-18 DIAGNOSIS — R20.2 NUMBNESS AND TINGLING OF RIGHT ARM AND LEG: ICD-10-CM

## 2023-05-18 DIAGNOSIS — R20.0 NUMBNESS AND TINGLING OF RIGHT ARM AND LEG: ICD-10-CM

## 2023-05-18 DIAGNOSIS — R20.0 ANESTHESIA OF SKIN: ICD-10-CM

## 2023-05-18 PROCEDURE — A9585 GADOBUTROL INJECTION: HCPCS | Performed by: SURGERY

## 2023-05-18 PROCEDURE — 25500020 PHARM REV CODE 255: Performed by: SURGERY

## 2023-05-18 PROCEDURE — 70553 MRI BRAIN W WO CONTRAST: ICD-10-PCS | Mod: 26,,, | Performed by: RADIOLOGY

## 2023-05-18 PROCEDURE — 70553 MRI BRAIN STEM W/O & W/DYE: CPT | Mod: 26,,, | Performed by: RADIOLOGY

## 2023-05-18 PROCEDURE — 70553 MRI BRAIN STEM W/O & W/DYE: CPT | Mod: TC

## 2023-05-18 RX ORDER — GADOBUTROL 604.72 MG/ML
7 INJECTION INTRAVENOUS
Status: COMPLETED | OUTPATIENT
Start: 2023-05-18 | End: 2023-05-18

## 2023-05-18 RX ADMIN — GADOBUTROL 7 ML: 604.72 INJECTION INTRAVENOUS at 07:05

## 2023-07-02 NOTE — PROGRESS NOTES
Chief Complaint: Well Woman Exam     HPI:      Hillary Hughes is a 37 y.o.  who presents today for well woman exam.  LMP: No LMP recorded. Patient has had an implant.  No issues, problems, or complaints. Specifically, patient denies abnormal vaginal bleeding, discharge, pelvic pain, urinary problems, or changes in appetite. Ms. Hughes is currently sexually active with a single male partner. She would like STD screening today. Patient has regular monthly menses- rare spotting only. No LMP recorded. Patient has had an implant. She is currently using IUD for contraception.    Previous Pap: no abnormalities  20    Gardasil:  does not think done and counseled on pharmacy option if desired.      OB History    This patient's OB History needs to be verified. Open OB History to review and resolve any issues.      5    Para   4    Term   4       0    AB   1    Living   4         SAB   0    IAB   0    Ectopic   0    Multiple   0    Live Births   4                 GYN History  Age of Menarche:14      ROS:     GENERAL: Denies unintentional weight gain or weight loss. Feeling well overall.   SKIN: Denies rash or lesions.   HEENT: Denies headaches, or vision changes.   CARDIOVASCULAR: Denies palpitations or chest pain.   RESPIRATORY: Denies shortness of breath or dyspnea on exertion.  BREASTS: Denies pain, lumps, or nipple discharge.   ABDOMEN: Denies abdominal pain, constipation, diarrhea, nausea, vomiting, change in appetite.  URINARY: Denies frequency, dysuria, hematuria.  NEUROLOGIC: Denies syncope or weakness.   PSYCHIATRIC: Denies depression, anxiety or mood swings.    Physical Exam:      PHYSICAL EXAM:  /70 (BP Location: Right arm, Patient Position: Sitting, BP Method: Medium (Manual))   Wt 63.7 kg (140 lb 8.7 oz)   BMI 25.71 kg/m²   Body mass index is 25.71 kg/m².     APPEARANCE: Well nourished, well developed, in no acute distress.  PSYCH: Appropriate mood and affect.  SKIN: No acne or  hirsutism  NECK: Neck symmetric without masses or thyromegaly  NODES: No inguinal, axillary, or supraclavicular lymph node enlargement  ABDOMEN: Soft.  No tenderness or masses.    CARDIOVASCULAR: No edema of peripheral extremities  BREASTS: Symmetrical, no skin changes or visible lesions.  No palpable masses or nipple discharge bilaterally.  PELVIC: Normal external genitalia without lesions.  Normal hair distribution.  Adequate perineal body, normal urethral meatus.  Vagina moist and well rugated without lesions or discharge.  Cervix pink, without lesions, discharge or tenderness.  IUD string seen 3 cm outside of cervical os. No significant cystocele or rectocele.  Bimanual exam shows uterus to be normal size, regular, mobile and nontender.  Adnexa without masses or tenderness.      Assessment/Plan:     Encounter for annual routine gynecological examination  Normal exam today. Pap smear done today. STD screen done today. Gardasil counseling done. Doing well with IUD. Other health maintenance up to date per PCP.     Screening for cervical cancer  -     Liquid-Based Pap Smear, Screening    Screening for STDs (sexually transmitted diseases)  -     Hepatitis B Surface Antigen; Future; Expected date: 04/28/2023  -     Hepatitis C Antibody; Future; Expected date: 07/28/2023  -     HIV 1/2 Ag/Ab (4th Gen); Future; Expected date: 04/28/2023  -     RPR; Future; Expected date: 04/28/2023  -     C. trachomatis/N. gonorrhoeae by AMP DNA [facility]; Cervicovaginal    Surveillance of intrauterine contraception    RTC 1 year    Counseling:     Patient was counseled today on current ASCCP pap guidelines, the recommendation for yearly pelvic exams, healthy diet and exercise routines, breast self awareness.She is to see her PCP for other health maintenance.     Use of the [employer] Patient Portal discussed and encouraged during today's visit.       Nancy Silva MD      As of April 1, 2021, the Cures Act has been passed nationally. This  new law requires that all doctors progress notes, lab results, pathology reports and radiology reports be released IMMEDIATELY to the patient in the patient portal. That means that the results are released to you at the EXACT same time they are released to me. Therefore, with all of the patients that I have I am not able to reply to each patient exactly when the results come in. So there will be a delay from when you see the results to when I see them and have time to come up with a response to send you. Also I only see these results when I am on the computer at work. So if the results come in over the weekend or after 5 pm of a work day, I will not see them until the next business day. As you can tell, this is a challenge as a physician to give every patient the quick response they hope for and deserve. So please be patient! Thanks for understanding, Dr. Silva

## 2023-07-18 ENCOUNTER — LAB VISIT (OUTPATIENT)
Dept: LAB | Facility: HOSPITAL | Age: 38
End: 2023-07-18
Attending: OBSTETRICS & GYNECOLOGY
Payer: COMMERCIAL

## 2023-07-18 DIAGNOSIS — N91.2 ABSENT MENSES: ICD-10-CM

## 2023-07-18 DIAGNOSIS — R11.0 NAUSEA: ICD-10-CM

## 2023-07-18 DIAGNOSIS — R39.15 URGENCY OF URINATION: Primary | ICD-10-CM

## 2023-07-18 DIAGNOSIS — R39.15 URGENCY OF URINATION: ICD-10-CM

## 2023-07-18 PROCEDURE — 87086 URINE CULTURE/COLONY COUNT: CPT | Performed by: OBSTETRICS & GYNECOLOGY

## 2023-07-18 RX ORDER — ONDANSETRON 4 MG/1
4 TABLET, ORALLY DISINTEGRATING ORAL EVERY 6 HOURS PRN
Qty: 30 TABLET | Refills: 2 | OUTPATIENT
Start: 2023-07-18 | End: 2024-01-08

## 2023-07-18 RX ORDER — NITROFURANTOIN 25; 75 MG/1; MG/1
100 CAPSULE ORAL 2 TIMES DAILY
Qty: 10 CAPSULE | Refills: 0 | Status: SHIPPED | OUTPATIENT
Start: 2023-07-18 | End: 2023-07-23

## 2023-07-18 NOTE — TELEPHONE ENCOUNTER
Mirena placed in 2021.    For the past 3 weeks, pt has been having excessive intermittent nausea.  Neg UPT.  Reports cramping and spotting when wiping.  Reports urgency and frequency, but denies dysuria.  Denies vaginitis symptoms.  Pt doesn't usually get a period with Mirena in.  Denies being dehydrated.     Ordered urine cx and scheduled to drop off today.  Pt would like to start on abx and would like Zofran.    Macrobid and Zofran pended

## 2023-07-18 NOTE — TELEPHONE ENCOUNTER
Oh I also forgot to document that she requested HCG levels.  So I ordered that too and she's having that drawn for peace of mind.

## 2023-07-19 LAB — BACTERIA UR CULT: NO GROWTH

## 2024-01-08 ENCOUNTER — HOSPITAL ENCOUNTER (EMERGENCY)
Facility: OTHER | Age: 39
Discharge: HOME | End: 2024-01-08
Attending: EMERGENCY MEDICINE
Payer: COMMERCIAL

## 2024-01-08 ENCOUNTER — TELEPHONE (OUTPATIENT)
Dept: INTERNAL MEDICINE | Facility: CLINIC | Age: 39
End: 2024-01-08
Payer: COMMERCIAL

## 2024-01-08 ENCOUNTER — PATIENT MESSAGE (OUTPATIENT)
Dept: INTERNAL MEDICINE | Facility: CLINIC | Age: 39
End: 2024-01-08
Payer: COMMERCIAL

## 2024-01-08 VITALS
TEMPERATURE: 98 F | RESPIRATION RATE: 18 BRPM | BODY MASS INDEX: 26.68 KG/M2 | SYSTOLIC BLOOD PRESSURE: 99 MMHG | DIASTOLIC BLOOD PRESSURE: 55 MMHG | OXYGEN SATURATION: 99 % | WEIGHT: 145 LBS | HEIGHT: 62 IN | HEART RATE: 57 BPM

## 2024-01-08 DIAGNOSIS — R10.9 LEFT FLANK PAIN: Primary | ICD-10-CM

## 2024-01-08 DIAGNOSIS — R30.0 DYSURIA: ICD-10-CM

## 2024-01-08 LAB
ALBUMIN SERPL BCP-MCNC: 4.1 G/DL (ref 3.5–5.2)
ALP SERPL-CCNC: 47 U/L (ref 55–135)
ALT SERPL W/O P-5'-P-CCNC: 20 U/L (ref 10–44)
ANION GAP SERPL CALC-SCNC: 9 MMOL/L (ref 8–16)
AST SERPL-CCNC: 20 U/L (ref 10–40)
B-HCG UR QL: NEGATIVE
BASOPHILS # BLD AUTO: 0.04 K/UL (ref 0–0.2)
BASOPHILS NFR BLD: 0.8 % (ref 0–1.9)
BILIRUB SERPL-MCNC: 0.7 MG/DL (ref 0.1–1)
BILIRUB UR QL STRIP: NEGATIVE
BUN SERPL-MCNC: 10 MG/DL (ref 6–20)
CALCIUM SERPL-MCNC: 8.7 MG/DL (ref 8.7–10.5)
CHLORIDE SERPL-SCNC: 106 MMOL/L (ref 95–110)
CLARITY UR: CLEAR
CO2 SERPL-SCNC: 22 MMOL/L (ref 23–29)
COLOR UR: COLORLESS
CREAT SERPL-MCNC: 0.8 MG/DL (ref 0.5–1.4)
CTP QC/QA: YES
DIFFERENTIAL METHOD BLD: ABNORMAL
EOSINOPHIL # BLD AUTO: 0.1 K/UL (ref 0–0.5)
EOSINOPHIL NFR BLD: 1 % (ref 0–8)
ERYTHROCYTE [DISTWIDTH] IN BLOOD BY AUTOMATED COUNT: 12 % (ref 11.5–14.5)
EST. GFR  (NO RACE VARIABLE): >60 ML/MIN/1.73 M^2
GLUCOSE SERPL-MCNC: 96 MG/DL (ref 70–110)
GLUCOSE UR QL STRIP: NEGATIVE
HCT VFR BLD AUTO: 34.9 % (ref 37–48.5)
HGB BLD-MCNC: 11.9 G/DL (ref 12–16)
HGB UR QL STRIP: NEGATIVE
IMM GRANULOCYTES # BLD AUTO: 0.01 K/UL (ref 0–0.04)
IMM GRANULOCYTES NFR BLD AUTO: 0.2 % (ref 0–0.5)
KETONES UR QL STRIP: NEGATIVE
LEUKOCYTE ESTERASE UR QL STRIP: NEGATIVE
LYMPHOCYTES # BLD AUTO: 1.5 K/UL (ref 1–4.8)
LYMPHOCYTES NFR BLD: 29.1 % (ref 18–48)
MCH RBC QN AUTO: 32.3 PG (ref 27–31)
MCHC RBC AUTO-ENTMCNC: 34.1 G/DL (ref 32–36)
MCV RBC AUTO: 95 FL (ref 82–98)
MONOCYTES # BLD AUTO: 0.4 K/UL (ref 0.3–1)
MONOCYTES NFR BLD: 7.4 % (ref 4–15)
NEUTROPHILS # BLD AUTO: 3.2 K/UL (ref 1.8–7.7)
NEUTROPHILS NFR BLD: 61.5 % (ref 38–73)
NITRITE UR QL STRIP: NEGATIVE
NRBC BLD-RTO: 0 /100 WBC
PH UR STRIP: 6 [PH] (ref 5–8)
PLATELET # BLD AUTO: 253 K/UL (ref 150–450)
PMV BLD AUTO: 10.1 FL (ref 9.2–12.9)
POTASSIUM SERPL-SCNC: 3.4 MMOL/L (ref 3.5–5.1)
PROT SERPL-MCNC: 7 G/DL (ref 6–8.4)
PROT UR QL STRIP: NEGATIVE
RBC # BLD AUTO: 3.68 M/UL (ref 4–5.4)
SODIUM SERPL-SCNC: 137 MMOL/L (ref 136–145)
SP GR UR STRIP: <1.005 (ref 1–1.03)
URN SPEC COLLECT METH UR: ABNORMAL
UROBILINOGEN UR STRIP-ACNC: NEGATIVE EU/DL
WBC # BLD AUTO: 5.16 K/UL (ref 3.9–12.7)

## 2024-01-08 PROCEDURE — 87086 URINE CULTURE/COLONY COUNT: CPT | Performed by: NURSE PRACTITIONER

## 2024-01-08 PROCEDURE — 80053 COMPREHEN METABOLIC PANEL: CPT | Performed by: NURSE PRACTITIONER

## 2024-01-08 PROCEDURE — 81003 URINALYSIS AUTO W/O SCOPE: CPT | Performed by: EMERGENCY MEDICINE

## 2024-01-08 PROCEDURE — 63600175 PHARM REV CODE 636 W HCPCS: Performed by: NURSE PRACTITIONER

## 2024-01-08 PROCEDURE — 99285 EMERGENCY DEPT VISIT HI MDM: CPT | Mod: 25

## 2024-01-08 PROCEDURE — 25000003 PHARM REV CODE 250: Performed by: PHYSICIAN ASSISTANT

## 2024-01-08 PROCEDURE — 96374 THER/PROPH/DIAG INJ IV PUSH: CPT

## 2024-01-08 PROCEDURE — 81025 URINE PREGNANCY TEST: CPT | Performed by: EMERGENCY MEDICINE

## 2024-01-08 PROCEDURE — 85025 COMPLETE CBC W/AUTO DIFF WBC: CPT | Performed by: NURSE PRACTITIONER

## 2024-01-08 PROCEDURE — 25000003 PHARM REV CODE 250: Performed by: NURSE PRACTITIONER

## 2024-01-08 PROCEDURE — 96361 HYDRATE IV INFUSION ADD-ON: CPT

## 2024-01-08 PROCEDURE — 96375 TX/PRO/DX INJ NEW DRUG ADDON: CPT

## 2024-01-08 RX ORDER — KETOROLAC TROMETHAMINE 30 MG/ML
10 INJECTION, SOLUTION INTRAMUSCULAR; INTRAVENOUS
Status: COMPLETED | OUTPATIENT
Start: 2024-01-08 | End: 2024-01-08

## 2024-01-08 RX ORDER — ONDANSETRON HYDROCHLORIDE 2 MG/ML
4 INJECTION, SOLUTION INTRAVENOUS
Status: COMPLETED | OUTPATIENT
Start: 2024-01-08 | End: 2024-01-08

## 2024-01-08 RX ORDER — TAMSULOSIN HYDROCHLORIDE 0.4 MG/1
0.4 CAPSULE ORAL
Status: COMPLETED | OUTPATIENT
Start: 2024-01-08 | End: 2024-01-08

## 2024-01-08 RX ORDER — KETOROLAC TROMETHAMINE 10 MG/1
10 TABLET, FILM COATED ORAL EVERY 6 HOURS
Qty: 12 TABLET | Refills: 0 | Status: SHIPPED | OUTPATIENT
Start: 2024-01-08 | End: 2024-01-11

## 2024-01-08 RX ORDER — ONDANSETRON 4 MG/1
4 TABLET, ORALLY DISINTEGRATING ORAL EVERY 8 HOURS PRN
Qty: 30 TABLET | Refills: 0 | Status: SHIPPED | OUTPATIENT
Start: 2024-01-08

## 2024-01-08 RX ADMIN — TAMSULOSIN HYDROCHLORIDE 0.4 MG: 0.4 CAPSULE ORAL at 12:01

## 2024-01-08 RX ADMIN — ONDANSETRON 4 MG: 2 INJECTION INTRAMUSCULAR; INTRAVENOUS at 12:01

## 2024-01-08 RX ADMIN — SODIUM CHLORIDE 1000 ML: 9 INJECTION, SOLUTION INTRAVENOUS at 12:01

## 2024-01-08 RX ADMIN — KETOROLAC TROMETHAMINE 10 MG: 30 INJECTION, SOLUTION INTRAMUSCULAR; INTRAVENOUS at 12:01

## 2024-01-08 NOTE — ED PROVIDER NOTES
Source of History:  Patient    Chief complaint:  Flank Pain (Reports L flank pain, abd pain, nausea, urinary urgency, hematuria that started today. Hx pyelonephritis, UTIs. )      HPI:  Hillary Hughes is a 38 y.o. female presenting with left-sided flank pain.  Patient reports that she has had some dull pain for the past some time however pain increased yesterday.  Describes it as sharp and radiating to the abdomen.  Does report some associated nausea, urinary frequency and hematuria.  She does report history of kidney stones however reports some time ago and hence does not currently follow with UTI.  She denies any known fever although has tried ibuprofen with some improvement symptoms over the past few days.  Reported chills last night.    This is the extent to the patients complaints today here in the emergency department.    ROS: As per HPI     Review of patient's allergies indicates:   Allergen Reactions    Lactose     Ciprofloxacin Nausea Only and Other (See Comments)     Patient has joint pain.       PMH:  As per HPI and below:  Past Medical History:   Diagnosis Date    Concussion     H/O concussion     History of urinary calculi     Kidney infection     kidney stones aand kidney infection 2012/and 13    Kidney stones     Thoracic outlet syndrome      Past Surgical History:   Procedure Laterality Date    BREAST AUGMENTATION       SECTION  2004    Boy     SECTION  2010    Boy     SECTION  2015    Boy     SECTION  2018    Girl    CHOLECYSTECTOMY  2012    CYSTOSCOPY W/ RETROGRADES Bilateral 2019    Procedure: CYSTOSCOPY, WITH RETROGRADE PYELOGRAM;  Surgeon: Vipul Sharma MD;  Location: [facility] OR [address] [place] [address];  Service: Urology;  Laterality: Bilateral;    DILATION AND CURETTAGE OF UTERUS      TAB    EPIDURAL STEROID INJECTION N/A 5/3/2022    Procedure: INJECTION, STEROID, EPIDURAL, C7-T1;  Surgeon: Sharon Gale MD;  Location: [facility] PAIN  "MGT;  Service: Pain Management;  Laterality: N/A;    URETEROSCOPY Right 6/7/2019    Procedure: URETEROSCOPY;  Surgeon: Vipul Sharma MD;  Location: [facility] OR [address] [place] [address];  Service: Urology;  Laterality: Right;  1hr       Social History     Tobacco Use    Smoking status: Never    Smokeless tobacco: Never   Substance Use Topics    Alcohol use: No    Drug use: No       Physical Exam:    BP (!) 99/55 (BP Location: Right arm, Patient Position: Sitting)   Pulse (!) 57   Temp 97.6 °F (36.4 °C) (Oral)   Resp 18   Ht 5' 2" (1.575 m)   Wt 65.8 kg (145 lb)   SpO2 99%   Breastfeeding No   BMI 26.52 kg/m²   Nursing note and vital signs reviewed.  Constitutional: No acute distress.  Nontoxic  Eyes: No conjunctival injection.Extraocular muscles are intact.  ENT: Oropharynx clear.  Normal phonation.   Cardiovascular: Regular rate and rhythm.  No murmurs. No gallops. No rubs  Respiratory: Clear to auscultation bilaterally.  Good air movement.  No wheezes.  No rhonchi. No rales. No accessory muscle use..  Abdomen: Soft.  Not distended.  Nontender.  No guarding.  No rebound. Non-peritoneal.  No overt CVA tenderness  Musculoskeletal: Good range of motion all joints.  No deformities.  Neck supple.  No meningismus.  Skin: No rashes seen.  Good turgor.  No abrasions.  No ecchymoses.  Neurologic: Motor intact.  Sensation intact.  No ataxia. No focal neurological deficits.  Psych: Appropriate, conversant    Labs that have been ordered have been independently reviewed and interpreted by myself.    I decided to obtain the patient's medical records.      MDM/ Differential Dx:    Hillary Hughes 38 y.o. presented to the ED with c/o left flank pain  Physical exam reveals well-appearing female in modest distress secondary to symptoms.  Abdomen is soft and nontender.  No overt CVA tenderness.  No pallor icterus.  Remaining exam grossly unremarkable.    Differential Diagnosis includes, but is not limited to:  AAA, aortic dissection, " SBO/volvulus, intussusception, ileus, appendicitis, cholecystitis, hepatitis, nephrolithiasis, pancreatitis, IBD/IBS, biliary colic, GERD, PUD, constipation, UTI/pyelonephritis, musculoskeletal pain.       ED management:  Patient seen in tele triage process with initial orders initiated including CT scan.  Labs notable for slight anemia.  No significant change from patient's baseline.  Slight hypokalemia at 3.4.  Urine grossly unremarkable including no overt hematuria signs of infection.  CT with no obstructive process or acute abnormalities.  She did report improvement symptoms in the ER after IV fluids, Toradol and antiemetic.  Discussed potential of having passed stone and was encouraged to follow-up.  Given her history will send urine culture    Impression/Plan: Patient informed of diagnosis The primary encounter diagnosis was Left flank pain. A diagnosis of Dysuria was also pertinent to this visit.  Discharged with Zofran and Toradol. Patient will follow up with Primary and will also give contacts for Urology for any continued symptoms.  Patient cautioned on when to return to ED.  Pt. Understands and agrees with current treatment plan      Results for orders placed or performed during the hospital encounter of 01/08/24   Urinalysis, Reflex to Urine Culture Urine, Clean Catch    Specimen: Urine   Result Value Ref Range    Specimen UA Urine, Clean Catch     Color, UA Colorless (A) Yellow, Straw, Manda    Appearance, UA Clear Clear    pH, UA 6.0 5.0 - 8.0    Specific Gravity, UA <1.005 (A) 1.005 - 1.030    Protein, UA Negative Negative    Glucose, UA Negative Negative    Ketones, UA Negative Negative    Bilirubin (UA) Negative Negative    Occult Blood UA Negative Negative    Nitrite, UA Negative Negative    Urobilinogen, UA Negative <2.0 EU/dL    Leukocytes, UA Negative Negative   CBC auto differential   Result Value Ref Range    WBC 5.16 3.90 - 12.70 K/uL    RBC 3.68 (L) 4.00 - 5.40 M/uL    Hemoglobin 11.9 (L)  12.0 - 16.0 g/dL    Hematocrit 34.9 (L) 37.0 - 48.5 %    MCV 95 82 - 98 fL    MCH 32.3 (H) 27.0 - 31.0 pg    MCHC 34.1 32.0 - 36.0 g/dL    RDW 12.0 11.5 - 14.5 %    Platelets 253 150 - 450 K/uL    MPV 10.1 9.2 - 12.9 fL    Immature Granulocytes 0.2 0.0 - 0.5 %    Gran # (ANC) 3.2 1.8 - 7.7 K/uL    Immature Grans (Abs) 0.01 0.00 - 0.04 K/uL    Lymph # 1.5 1.0 - 4.8 K/uL    Mono # 0.4 0.3 - 1.0 K/uL    Eos # 0.1 0.0 - 0.5 K/uL    Baso # 0.04 0.00 - 0.20 K/uL    nRBC 0 0 /100 WBC    Gran % 61.5 38.0 - 73.0 %    Lymph % 29.1 18.0 - 48.0 %    Mono % 7.4 4.0 - 15.0 %    Eosinophil % 1.0 0.0 - 8.0 %    Basophil % 0.8 0.0 - 1.9 %    Differential Method Automated    Comprehensive metabolic panel   Result Value Ref Range    Sodium 137 136 - 145 mmol/L    Potassium 3.4 (L) 3.5 - 5.1 mmol/L    Chloride 106 95 - 110 mmol/L    CO2 22 (L) 23 - 29 mmol/L    Glucose 96 70 - 110 mg/dL    BUN 10 6 - 20 mg/dL    Creatinine 0.8 0.5 - 1.4 mg/dL    Calcium 8.7 8.7 - 10.5 mg/dL    Total Protein 7.0 6.0 - 8.4 g/dL    Albumin 4.1 3.5 - 5.2 g/dL    Total Bilirubin 0.7 0.1 - 1.0 mg/dL    Alkaline Phosphatase 47 (L) 55 - 135 U/L    AST 20 10 - 40 U/L    ALT 20 10 - 44 U/L    eGFR >60 >60 mL/min/1.73 m^2    Anion Gap 9 8 - 16 mmol/L   POCT urine pregnancy   Result Value Ref Range    POC Preg Test, Ur Negative Negative     Acceptable Yes      Imaging Results              CT Renal Stone Study ABD Pelvis WO (Final result)  Result time 01/08/24 11:58:29      Final result by Aggie Contreras MD (01/08/24 11:58:29)                   Impression:      Negative for obstructive uropathy.  No acute process seen.      Electronically signed by: Aggie Contreras  Date:    01/08/2024  Time:    11:58               Narrative:    EXAMINATION:  CT RENAL STONE STUDY ABD PELVIS WO    CLINICAL HISTORY:  Flank pain, kidney stone suspected;    TECHNIQUE:  Low dose axial images, sagittal and coronal reformations were obtained from the lung bases to the  pubic symphysis.  Contrast was not administered.    COMPARISON:  01/13/2023    FINDINGS:  : Kidneys are symmetric in size and position.  No stones, hydronephrosis, or hydroureter.  Urinary bladder is unremarkable.  Intrauterine device present in the uterus.    Lung bases are clear.  Limited evaluation of solid organs due to lack of intravenous contrast.  Allowing for this, the liver, spleen, and pancreas are unremarkable on this noncontrast study.  Cholecystectomy.    Stomach and loops of bowel are normal in caliber.  No free fluid in the pelvis.    Regional skeleton is intact.                                                Diagnostic Impression:    1. Left flank pain    2. Dysuria         ED Disposition Condition    Discharge Stable            ED Prescriptions       Medication Sig Dispense Start Date End Date Auth. Provider    ondansetron (ZOFRAN-ODT) 4 MG TbDL Take 1 tablet (4 mg total) by mouth every 8 (eight) hours as needed. 30 tablet 1/8/2024 -- Stephanie Cantu PA    ketorolac (TORADOL) 10 mg tablet Take 1 tablet (10 mg total) by mouth every 6 (six) hours. for 3 days 12 tablet 1/8/2024 1/11/2024 Stephanie Cantu PA          Follow-up Information       Follow up With Specialties Details Why Contact Info Additional Information    Gerardo Pink MD Family Medicine Schedule an appointment as soon as possible for a visit   [address] [facility] [address]  [phone]       [facility] Urology Go to  As needed [address] [place] [address], [address]  [facility] [address] [phone] Urology - [facility], 6th Floor, [address] Patients seeing Dr. Garcia, please check in at [facility] [address]. Please park in the [facility]. Please come with a full bladder to in office visit to provide a urine specimen when you arrive.             Stephanie Cantu PA  01/08/24 7518

## 2024-01-08 NOTE — ED TRIAGE NOTES
Pt arrived with c/o nausea and RLQ abd pain intermittently since December 2023.  Pt reports sharp bilateral flank pain, L worse than R, starting today.  Denies any vomiting, reports one episode of loose stool today.  Pt reports hx of kidney stones and infections.  Pt endorses urinary urgency since December, denies any other urinary symptoms.  Denies any fever.  Pt answering questions appropriately, speaking in complete sentences, respirations even and unlabored.  Aao x 4.

## 2024-01-08 NOTE — TELEPHONE ENCOUNTER
----- Message from Solange North MA sent at 1/8/2024 11:02 AM CST -----  Contact: Pt  [phone]    ----- Message -----  From: Lesley Jarrett  Sent: 1/8/2024   8:09 AM CST  To: Joesph Carrillo Staff    [email or web address]     Patient is calling for Medical Advice regarding: Pain in left side (possible UTI)    How long has patient had these symptoms: 1 day     Pharmacy name and phone#:   [id number] - [place] - [address] [facility]  [place] [address]  Phone: [phone] Fax: [phone]           Would like response via [employer]:  call back     Comments: pt would like an order for urine

## 2024-01-08 NOTE — FIRST PROVIDER EVALUATION
Emergency Department TeleTriage Encounter Note      CHIEF COMPLAINT    Chief Complaint   Patient presents with    Flank Pain     Reports L flank pain, abd pain, nausea, urinary urgency, hematuria that started today. Hx pyelonephritis, UTIs.        VITAL SIGNS   Initial Vitals [01/08/24 1047]   BP Pulse Resp Temp SpO2   123/80 68 18 98.3 °F (36.8 °C) 99 %      MAP       --            ALLERGIES    Review of patient's allergies indicates:   Allergen Reactions    Lactose     Ciprofloxacin Nausea Only and Other (See Comments)     Patient has joint pain.       PROVIDER TRIAGE NOTE  This is a teletriage evaluation of a 38 y.o. female presenting to the ED complaining of left flank pain with urgency and hematuria starting today. Reports pmhx of kidney stone and this feels similar. No fever.     Alert, sitting upright, no distress.     Initial orders will be placed and care will be transferred to an alternate provider when patient is roomed for a full evaluation. Any additional orders and the final disposition will be determined by that provider.         ORDERS  Labs Reviewed   URINALYSIS, REFLEX TO URINE CULTURE   POCT URINE PREGNANCY       ED Orders (720h ago, onward)      Start Ordered     Status Ordering Provider    01/08/24 1115 01/08/24 1112  ondansetron injection 4 mg  ED 1 Time         Ordered DOROTHY SUAREZ N.    01/08/24 1115 01/08/24 1112  ketorolac injection 9.999 mg  ED 1 Time         Ordered DOROTHY SUAREZ N.    01/08/24 1115 01/08/24 1112  sodium chloride 0.9% bolus 1,000 mL 1,000 mL  ED 1 Time         Ordered DOROTHY SUAREZ N.    01/08/24 1112 01/08/24 1112  Saline lock IV  Once         Ordered DOROTHY SUAREZ N.    01/08/24 1112 01/08/24 1112  CBC auto differential  STAT         Ordered DOROTHY SUAREZ N.    01/08/24 1112 01/08/24 1112  Comprehensive metabolic panel  STAT         Ordered DOROTHY SUAREZ N.    01/08/24 1112 01/08/24 1112  Urinalysis, Reflex to  Urine Culture Urine, Clean Catch  STAT         Ordered DOROTHY SUAREZ N.    01/08/24 1112 01/08/24 1112  CT Renal Stone Study ABD Pelvis WO  1 time imaging         Ordered DOROTHY SUAREZ N.    01/08/24 1109 01/08/24 1108  Urinalysis, Reflex to Urine Culture Urine, Clean Catch  STAT         In process TAWANA LIRA II    01/08/24 1109 01/08/24 1108  POCT urine pregnancy  Once        Comments: For women of childbearing age w/o hysterectomy or known pregnancy.    Final result TAWANA LIRA II              Virtual Visit Note: The provider triage portion of this emergency department evaluation and documentation was performed via [employer], a HIPAA-compliant telemedicine application, in concert with a tele-presenter in the room. A face to face patient evaluation with one of my colleagues will occur once the patient is placed in an emergency department room.      DISCLAIMER: This note was prepared with [employer] voice recognition transcription software. Garbled syntax, mangled pronouns, and other bizarre constructions may be attributed to that software system.

## 2024-01-08 NOTE — Clinical Note
"Hillary Hanley" Saul was seen and treated in our emergency department on 1/8/2024.  She may return to work on 01/09/2024.       If you have any questions or concerns, please don't hesitate to call.      Stephanie Cantu PA"

## 2024-01-09 LAB — BACTERIA UR CULT: NO GROWTH

## 2024-01-11 ENCOUNTER — OFFICE VISIT (OUTPATIENT)
Dept: GASTROENTEROLOGY | Facility: CLINIC | Age: 39
End: 2024-01-11
Payer: COMMERCIAL

## 2024-01-11 DIAGNOSIS — R10.13 DYSPEPSIA: Primary | ICD-10-CM

## 2024-01-11 PROCEDURE — 99204 OFFICE O/P NEW MOD 45 MIN: CPT | Mod: 95,,,

## 2024-01-11 RX ORDER — FAMOTIDINE 40 MG/1
40 TABLET, FILM COATED ORAL DAILY
Qty: 30 TABLET | Refills: 2 | Status: SHIPPED | OUTPATIENT
Start: 2024-01-11 | End: 2025-01-10

## 2024-01-11 NOTE — PATIENT INSTRUCTIONS
FODMAPs are carbohydrates (sugars) that are found in foods. Not all carbohydrates are considered FODMAPs. FODMAPs are osmotic (means they pull water into the intestinal tract), may not be digested or absorbed well and could be fermented upon by bacteria in the intestinal tract when eaten in excess. Symptoms include gas, bloating, cramping, and/or diarrhea may occur. A low FODMAP diet limits foods high in fructose, lactose, fructans, galactans, and polyols.     Also avoid artifical sweeteners, chewing gum, carbonated beverages, and drinking from straws.

## 2024-01-11 NOTE — PROGRESS NOTES
GASTROENTEROLOGY CLINIC NOTE    The patient location is: [place]  Visit type: audiovisual  Face to Face time with patient: 13 mins  21 minutes of total time spent on the encounter, which includes face to face time and non-face to face time preparing to see the patient (eg, review of tests), Obtaining and/or reviewing separately obtained history, Documenting clinical information in the electronic or other health record, Independently interpreting results (not separately reported) and communicating results to the patient/family/caregiver, or Care coordination (not separately reported).     HPI:  Hillary Hughes is a 38 y.o. female presents today for ER f/u. She reports about a month ago seeing blood in her urine. Developed flank pain a few days ago and urinary urgency- presented to ER, UA negative and CT without identifiable cause. She reports also developing severe abd bloating, her upper abd was hard to the touch. Also with excessive burping. She has rare pyrosis. She is lactose intolerant. No abd pain- still with flank pain. She reports having BM's every 3 days, this is normal for her, no bloating in between BM.     Prior Endoscopy:  EGD:  Colon:    (Portions of this note were dictated using voice recognition software and may contain dictation related errors in spelling/grammar/syntax not found on text review)    Review of Systems   Gastrointestinal:  Negative for abdominal pain and nausea.   Genitourinary:  Positive for flank pain and urgency.       Past Medical History: has a past medical history of Concussion, H/O concussion, History of urinary calculi, Kidney infection, Kidney stones, and Thoracic outlet syndrome.    Past Surgical History: has a past surgical history that includes Cholecystectomy ();  section (2004);  section (2010);  section (2015);  section (2018); Dilation and curettage of uterus (); BREAST AUGMENTATION ();  Ureteroscopy (Right, 6/7/2019); Cystoscopy w/ retrogrades (Bilateral, 6/7/2019); and Epidural steroid injection (N/A, 5/3/2022).    Home medications:   Current Outpatient Medications on File Prior to Visit   Medication Sig Dispense Refill    cetirizine (ZYRTEC) 10 MG tablet Take 1 tablet (10 mg total) by mouth once daily. 30 tablet 0    ketorolac (TORADOL) 10 mg tablet Take 1 tablet (10 mg total) by mouth every 6 (six) hours. for 3 days 12 tablet 0    LIDOcaine (LIDODERM) 5 % Place 1 patch onto the skin once daily. Remove & Discard patch within 12 hours or as directed by MD 30 patch 0    ondansetron (ZOFRAN-ODT) 4 MG TbDL Take 1 tablet (4 mg total) by mouth every 8 (eight) hours as needed. 30 tablet 0     Current Facility-Administered Medications on File Prior to Visit   Medication Dose Route Frequency Provider Last Rate Last Admin    levonorgestreL 20 mcg/24 hours (6 yrs) 52 mg IUD 1 Intra Uterine Device  1 Intra Uterine Device Intrauterine  Swing, Tanisha NG MD   1 Intra Uterine Device at 04/28/21 1115       Vital signs:  There were no vitals taken for this visit.    Physical Exam  Vitals reviewed: limited d/t telemedicine.   Constitutional:       Appearance: Normal appearance.   Neurological:      Mental Status: She is alert.       Results for orders placed or performed during the hospital encounter of 01/08/24 (from the past 2160 hour(s))   CT Renal Stone Study ABD Pelvis WO    Narrative    EXAMINATION:  CT RENAL STONE STUDY ABD PELVIS WO    CLINICAL HISTORY:  Flank pain, kidney stone suspected;    TECHNIQUE:  Low dose axial images, sagittal and coronal reformations were obtained from the lung bases to the pubic symphysis.  Contrast was not administered.    COMPARISON:  01/13/2023    FINDINGS:  : Kidneys are symmetric in size and position.  No stones, hydronephrosis, or hydroureter.  Urinary bladder is unremarkable.  Intrauterine device present in the uterus.    Lung bases are clear.  Limited evaluation of  solid organs due to lack of intravenous contrast.  Allowing for this, the liver, spleen, and pancreas are unremarkable on this noncontrast study.  Cholecystectomy.    Stomach and loops of bowel are normal in caliber.  No free fluid in the pelvis.    Regional skeleton is intact.      Impression    Negative for obstructive uropathy.  No acute process seen.      Electronically signed by: Aggie Contreras  Date:    01/08/2024  Time:    11:58      Each patient to whom he or she provides medical services by telemedicine is:  (1) informed of the relationship between the physician and patient and the respective role of any other health care provider with respect to management of the patient; and (2) notified that he or she may decline to receive medical services by telemedicine and may withdraw from such care at any time.    I have reviewed associated labs, imaging and notes.     Assessment:  1. Dyspepsia    Dyspepsia   Abd bloating and belching   Rare reflux  BM's q3 days, baseline   No bloating in between BM's    R/o hpylori, trial pepcid and review low FODMAP. Encouraged pt to reach out to urology to discuss current urinary issues.     Plan:  Orders Placed This Encounter    H. pylori Antibody, IgG    famotidine (PEPCID) 40 MG tablet     Check blood work  Start taking pepcid daily for 1-2 months  Review low FODMAP    F/U in 2 months    Mikaela Obando NP  [facility]

## 2024-01-29 ENCOUNTER — OFFICE VISIT (OUTPATIENT)
Dept: VASCULAR SURGERY | Facility: CLINIC | Age: 39
End: 2024-01-29
Attending: SURGERY
Payer: COMMERCIAL

## 2024-01-29 DIAGNOSIS — G54.0 NEUROGENIC THORACIC OUTLET SYNDROME OF RIGHT BRACHIAL PLEXUS: Primary | ICD-10-CM

## 2024-01-29 PROCEDURE — 99999 PR PBB SHADOW E&M-EST. PATIENT-LVL I: CPT | Mod: PBBFAC,,, | Performed by: SURGERY

## 2024-01-29 PROCEDURE — 99213 OFFICE O/P EST LOW 20 MIN: CPT | Mod: S$GLB,,, | Performed by: SURGERY
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
## 2024-02-01 ENCOUNTER — OFFICE VISIT (OUTPATIENT)
Dept: INTERNAL MEDICINE | Facility: CLINIC | Age: 39
End: 2024-02-01
Payer: COMMERCIAL

## 2024-02-01 VITALS
OXYGEN SATURATION: 99 % | RESPIRATION RATE: 17 BRPM | HEIGHT: 62 IN | HEART RATE: 65 BPM | SYSTOLIC BLOOD PRESSURE: 100 MMHG | DIASTOLIC BLOOD PRESSURE: 66 MMHG | WEIGHT: 142.44 LBS | BODY MASS INDEX: 26.21 KG/M2 | TEMPERATURE: 97 F

## 2024-02-01 DIAGNOSIS — G54.0 NEUROGENIC THORACIC OUTLET SYNDROME OF RIGHT BRACHIAL PLEXUS: ICD-10-CM

## 2024-02-01 DIAGNOSIS — R06.02 SOB (SHORTNESS OF BREATH): ICD-10-CM

## 2024-02-01 DIAGNOSIS — Z23 NEED FOR VACCINATION: ICD-10-CM

## 2024-02-01 DIAGNOSIS — Z00.00 WELL ADULT EXAM: Primary | ICD-10-CM

## 2024-02-01 PROCEDURE — 99395 PREV VISIT EST AGE 18-39: CPT | Mod: 25,S$GLB,, | Performed by: FAMILY MEDICINE

## 2024-02-01 PROCEDURE — 3008F BODY MASS INDEX DOCD: CPT | Mod: CPTII,S$GLB,, | Performed by: FAMILY MEDICINE

## 2024-02-01 PROCEDURE — 1159F MED LIST DOCD IN RCRD: CPT | Mod: CPTII,S$GLB,, | Performed by: FAMILY MEDICINE

## 2024-02-01 PROCEDURE — 3074F SYST BP LT 130 MM HG: CPT | Mod: CPTII,S$GLB,, | Performed by: FAMILY MEDICINE

## 2024-02-01 PROCEDURE — 1160F RVW MEDS BY RX/DR IN RCRD: CPT | Mod: CPTII,S$GLB,, | Performed by: FAMILY MEDICINE

## 2024-02-01 PROCEDURE — 90471 IMMUNIZATION ADMIN: CPT | Mod: S$GLB,,, | Performed by: FAMILY MEDICINE

## 2024-02-01 PROCEDURE — 90686 IIV4 VACC NO PRSV 0.5 ML IM: CPT | Mod: S$GLB,,, | Performed by: FAMILY MEDICINE

## 2024-02-01 PROCEDURE — 99999 PR PBB SHADOW E&M-EST. PATIENT-LVL IV: CPT | Mod: PBBFAC,,, | Performed by: FAMILY MEDICINE

## 2024-02-01 PROCEDURE — 3078F DIAST BP <80 MM HG: CPT | Mod: CPTII,S$GLB,, | Performed by: FAMILY MEDICINE

## 2024-02-01 RX ORDER — LIDOCAINE 50 MG/G
1 PATCH TOPICAL DAILY
Qty: 30 PATCH | Refills: 6 | Status: SHIPPED | OUTPATIENT
Start: 2024-02-01

## 2024-02-01 RX ORDER — ALBUTEROL SULFATE 90 UG/1
2 AEROSOL, METERED RESPIRATORY (INHALATION) EVERY 6 HOURS PRN
Qty: 18 G | Refills: 11 | Status: SHIPPED | OUTPATIENT
Start: 2024-02-01 | End: 2025-01-31

## 2024-02-01 NOTE — PROGRESS NOTES
Subjective     Patient ID: Hillary Hughes is a 38 y.o. female.    Chief Complaint: Annual Exam    HPI female presents to clinic today for annual physical exam.  She has been followed by sports Medicine, vascular Medicine, Neurology, and Physical therapy for treatment of cervical pain.  At this time she continues to be followed by vascular Medicine secondary to neurogenic thoracic outlet syndrome.  She has obtained mild relief in the past with nerve block.  She has had a previous MRI of the brain for further workup to rule out other CNS pathology with overall negative findings.  At this time she has found some relief of pain with use of lidocaine patches but continues to have the nerve pain.  She also continues to note shortness of breath with exertion.  I have recommended pretreatment with albuterol prior to exercise.  She reports a past surgical history of , cholecystectomy, D&C, breast augmentation, and cystoscopy.  She reports a family history of her father having diabetes and hypertension.  Her mother has anemia.  Flu vaccine has been discussed and given.  Review of Systems   Constitutional:  Positive for unexpected weight change. Negative for activity change, appetite change, chills, fatigue and fever.   HENT:  Negative for nasal congestion, ear pain, hearing loss, postnasal drip, rhinorrhea, sinus pressure/congestion, sore throat, tinnitus and trouble swallowing.    Eyes:  Negative for discharge, redness, itching and visual disturbance.   Respiratory:  Positive for shortness of breath. Negative for cough, chest tightness and wheezing.    Cardiovascular:  Positive for chest pain. Negative for palpitations.   Gastrointestinal:  Positive for diarrhea. Negative for abdominal pain, blood in stool, constipation, nausea and vomiting.   Endocrine: Positive for polyuria. Negative for polydipsia.   Genitourinary:  Positive for hematuria. Negative for decreased urine volume, difficulty urinating, dysuria,  frequency, menstrual problem and urgency.   Musculoskeletal:  Positive for neck pain. Negative for arthralgias, back pain, joint swelling, myalgias and neck stiffness.   Integumentary:  Negative for rash.   Neurological:  Positive for weakness. Negative for dizziness, light-headedness and headaches.   Psychiatric/Behavioral:  Positive for confusion and dysphoric mood.           Objective     Physical Exam  Vitals and nursing note reviewed.   Constitutional:       General: She is not in acute distress.     Appearance: She is well-developed. She is not diaphoretic.   HENT:      Head: Normocephalic and atraumatic.      Right Ear: External ear normal.      Left Ear: External ear normal.      Nose: Nose normal.      Mouth/Throat:      Pharynx: No oropharyngeal exudate.   Eyes:      General: No scleral icterus.        Right eye: No discharge.         Left eye: No discharge.      Conjunctiva/sclera: Conjunctivae normal.      Pupils: Pupils are equal, round, and reactive to light.   Neck:      Thyroid: No thyromegaly.      Vascular: No JVD.      Trachea: No tracheal deviation.   Cardiovascular:      Rate and Rhythm: Normal rate and regular rhythm.      Heart sounds: Normal heart sounds. No murmur heard.     No friction rub. No gallop.   Pulmonary:      Effort: Pulmonary effort is normal. No respiratory distress.      Breath sounds: Normal breath sounds. No stridor. No wheezing or rales.   Abdominal:      General: Bowel sounds are normal. There is no distension.      Palpations: Abdomen is soft. There is no mass.      Tenderness: There is no abdominal tenderness. There is no guarding or rebound.   Musculoskeletal:         General: No tenderness. Normal range of motion.      Cervical back: Normal range of motion and neck supple.   Lymphadenopathy:      Cervical: No cervical adenopathy.   Skin:     General: Skin is warm and dry.      Coloration: Skin is not pale.      Findings: No erythema or rash.   Neurological:      Mental  Status: She is alert and oriented to person, place, and time.   Psychiatric:         Behavior: Behavior normal.         Thought Content: Thought content normal.         Judgment: Judgment normal.            Assessment and Plan     1. Well adult exam  -     CBC Auto Differential; Future; Expected date: 02/01/2024  -     Comprehensive Metabolic Panel; Future; Expected date: 02/01/2024  -     Lipid Panel; Future; Expected date: 02/01/2024  -     T4, Free; Future; Expected date: 02/01/2024  -     TSH; Future; Expected date: 02/01/2024  -     Hemoglobin A1C; Future; Expected date: 02/01/2024  -     Urinalysis, Reflex to Urine Culture Urine, Clean Catch; Future; Expected date: 02/01/2024    2. Neurogenic thoracic outlet syndrome of right brachial plexus  -     LIDOcaine (LIDODERM) 5 %; Place 1 patch onto the skin once daily. Remove & Discard patch within 12 hours or as directed by MD  Dispense: 30 patch; Refill: 6    3. SOB (shortness of breath)  -     albuterol (PROVENTIL/VENTOLIN HFA) 90 mcg/actuation inhaler; Inhale 2 puffs into the lungs every 6 (six) hours as needed for Wheezing. Rescue  Dispense: 18 g; Refill: 11        1. CBC, CMP, UA, TSH, free T4, fasting lipids, and hemoglobin A1c.  2. Continue use of lidocaine patches as needed for neurogenic thoracic outlet syndrome and continue follow-up with vascular surgery as scheduled.  3. Recommend use of albuterol HFA 2 inhalations prior to surgery to assist with shortness of breath with exertion.    4. Flu vaccine given.    5. Return to clinic as needed or in 1 year for annual physical exam.               Follow up in about 1 year (around 2/1/2025), or if symptoms worsen or fail to improve, for Annual exam.

## 2024-02-10 ENCOUNTER — LAB VISIT (OUTPATIENT)
Dept: LAB | Facility: HOSPITAL | Age: 39
End: 2024-02-10
Attending: FAMILY MEDICINE
Payer: COMMERCIAL

## 2024-02-10 DIAGNOSIS — Z00.00 WELL ADULT EXAM: ICD-10-CM

## 2024-02-10 LAB
ALBUMIN SERPL BCP-MCNC: 4 G/DL (ref 3.5–5.2)
ALP SERPL-CCNC: 47 U/L (ref 55–135)
ALT SERPL W/O P-5'-P-CCNC: 18 U/L (ref 10–44)
ANION GAP SERPL CALC-SCNC: 10 MMOL/L (ref 8–16)
AST SERPL-CCNC: 19 U/L (ref 10–40)
BASOPHILS # BLD AUTO: 0.07 K/UL (ref 0–0.2)
BASOPHILS NFR BLD: 1.3 % (ref 0–1.9)
BILIRUB SERPL-MCNC: 0.5 MG/DL (ref 0.1–1)
BUN SERPL-MCNC: 10 MG/DL (ref 6–20)
CALCIUM SERPL-MCNC: 9.6 MG/DL (ref 8.7–10.5)
CHLORIDE SERPL-SCNC: 104 MMOL/L (ref 95–110)
CHOLEST SERPL-MCNC: 144 MG/DL (ref 120–199)
CHOLEST/HDLC SERPL: 2.4 {RATIO} (ref 2–5)
CO2 SERPL-SCNC: 25 MMOL/L (ref 23–29)
CREAT SERPL-MCNC: 0.7 MG/DL (ref 0.5–1.4)
DIFFERENTIAL METHOD BLD: ABNORMAL
EOSINOPHIL # BLD AUTO: 0.1 K/UL (ref 0–0.5)
EOSINOPHIL NFR BLD: 2.7 % (ref 0–8)
ERYTHROCYTE [DISTWIDTH] IN BLOOD BY AUTOMATED COUNT: 11.9 % (ref 11.5–14.5)
EST. GFR  (NO RACE VARIABLE): >60 ML/MIN/1.73 M^2
ESTIMATED AVG GLUCOSE: 105 MG/DL (ref 68–131)
GLUCOSE SERPL-MCNC: 81 MG/DL (ref 70–110)
HBA1C MFR BLD: 5.3 % (ref 4–5.6)
HCT VFR BLD AUTO: 39.9 % (ref 37–48.5)
HDLC SERPL-MCNC: 60 MG/DL (ref 40–75)
HDLC SERPL: 41.7 % (ref 20–50)
HGB BLD-MCNC: 12.8 G/DL (ref 12–16)
IMM GRANULOCYTES # BLD AUTO: 0.01 K/UL (ref 0–0.04)
IMM GRANULOCYTES NFR BLD AUTO: 0.2 % (ref 0–0.5)
LDLC SERPL CALC-MCNC: 71.2 MG/DL (ref 63–159)
LYMPHOCYTES # BLD AUTO: 1.6 K/UL (ref 1–4.8)
LYMPHOCYTES NFR BLD: 31.4 % (ref 18–48)
MCH RBC QN AUTO: 32.5 PG (ref 27–31)
MCHC RBC AUTO-ENTMCNC: 32.1 G/DL (ref 32–36)
MCV RBC AUTO: 101 FL (ref 82–98)
MONOCYTES # BLD AUTO: 0.4 K/UL (ref 0.3–1)
MONOCYTES NFR BLD: 7.1 % (ref 4–15)
NEUTROPHILS # BLD AUTO: 3 K/UL (ref 1.8–7.7)
NEUTROPHILS NFR BLD: 57.3 % (ref 38–73)
NONHDLC SERPL-MCNC: 84 MG/DL
NRBC BLD-RTO: 0 /100 WBC
PLATELET # BLD AUTO: 285 K/UL (ref 150–450)
PMV BLD AUTO: 10.7 FL (ref 9.2–12.9)
POTASSIUM SERPL-SCNC: 4 MMOL/L (ref 3.5–5.1)
PROT SERPL-MCNC: 7.2 G/DL (ref 6–8.4)
RBC # BLD AUTO: 3.94 M/UL (ref 4–5.4)
SODIUM SERPL-SCNC: 139 MMOL/L (ref 136–145)
T4 FREE SERPL-MCNC: 0.83 NG/DL (ref 0.71–1.51)
TRIGL SERPL-MCNC: 64 MG/DL (ref 30–150)
TSH SERPL DL<=0.005 MIU/L-ACNC: 1 UIU/ML (ref 0.4–4)
WBC # BLD AUTO: 5.22 K/UL (ref 3.9–12.7)

## 2024-02-10 PROCEDURE — 80061 LIPID PANEL: CPT | Performed by: FAMILY MEDICINE

## 2024-02-10 PROCEDURE — 83036 HEMOGLOBIN GLYCOSYLATED A1C: CPT | Performed by: FAMILY MEDICINE

## 2024-02-10 PROCEDURE — 36415 COLL VENOUS BLD VENIPUNCTURE: CPT | Mod: PO | Performed by: FAMILY MEDICINE

## 2024-02-10 PROCEDURE — 84443 ASSAY THYROID STIM HORMONE: CPT | Performed by: FAMILY MEDICINE

## 2024-02-10 PROCEDURE — 80053 COMPREHEN METABOLIC PANEL: CPT | Performed by: FAMILY MEDICINE

## 2024-02-10 PROCEDURE — 85025 COMPLETE CBC W/AUTO DIFF WBC: CPT | Performed by: FAMILY MEDICINE

## 2024-02-10 PROCEDURE — 84439 ASSAY OF FREE THYROXINE: CPT | Performed by: FAMILY MEDICINE

## 2024-03-01 ENCOUNTER — OFFICE VISIT (OUTPATIENT)
Dept: OBSTETRICS AND GYNECOLOGY | Facility: CLINIC | Age: 39
End: 2024-03-01
Payer: COMMERCIAL

## 2024-03-01 VITALS
SYSTOLIC BLOOD PRESSURE: 100 MMHG | BODY MASS INDEX: 26.59 KG/M2 | DIASTOLIC BLOOD PRESSURE: 66 MMHG | WEIGHT: 145.38 LBS

## 2024-03-01 DIAGNOSIS — Z01.419 ENCOUNTER FOR ANNUAL ROUTINE GYNECOLOGICAL EXAMINATION: Primary | ICD-10-CM

## 2024-03-01 DIAGNOSIS — Z30.431 SURVEILLANCE OF INTRAUTERINE CONTRACEPTION: ICD-10-CM

## 2024-03-01 PROCEDURE — 99395 PREV VISIT EST AGE 18-39: CPT | Mod: S$GLB,,, | Performed by: OBSTETRICS & GYNECOLOGY

## 2024-03-01 PROCEDURE — 3044F HG A1C LEVEL LT 7.0%: CPT | Mod: CPTII,S$GLB,, | Performed by: OBSTETRICS & GYNECOLOGY

## 2024-03-01 PROCEDURE — 99999 PR PBB SHADOW E&M-EST. PATIENT-LVL III: CPT | Mod: PBBFAC,,, | Performed by: OBSTETRICS & GYNECOLOGY

## 2024-03-01 PROCEDURE — 3074F SYST BP LT 130 MM HG: CPT | Mod: CPTII,S$GLB,, | Performed by: OBSTETRICS & GYNECOLOGY

## 2024-03-01 PROCEDURE — 3078F DIAST BP <80 MM HG: CPT | Mod: CPTII,S$GLB,, | Performed by: OBSTETRICS & GYNECOLOGY

## 2024-03-01 PROCEDURE — 3008F BODY MASS INDEX DOCD: CPT | Mod: CPTII,S$GLB,, | Performed by: OBSTETRICS & GYNECOLOGY

## 2024-03-01 PROCEDURE — 1160F RVW MEDS BY RX/DR IN RCRD: CPT | Mod: CPTII,S$GLB,, | Performed by: OBSTETRICS & GYNECOLOGY

## 2024-03-01 PROCEDURE — 1159F MED LIST DOCD IN RCRD: CPT | Mod: CPTII,S$GLB,, | Performed by: OBSTETRICS & GYNECOLOGY

## 2024-03-11 ENCOUNTER — TELEPHONE (OUTPATIENT)
Dept: PAIN MEDICINE | Facility: CLINIC | Age: 39
End: 2024-03-11
Payer: COMMERCIAL

## 2024-03-11 NOTE — PROGRESS NOTES
Chief Complaint: Well Woman Exam     HPI:      Hillary Hughes is a 38 y.o.  who presents today for well woman exam.  LMP: No LMP recorded (lmp unknown). Patient has had an implant.  No issues, problems, or complaints. Specifically, patient denies abnormal vaginal bleeding, discharge, pelvic pain, urinary problems, or changes in appetite. Ms. Hughes is currently sexually active with a single male partner. She declines STD screening today. Patient does not have regular monthly menses. No LMP recorded (lmp unknown). Patient has had an implant. She is currently using IUD for contraception.    Previous Pap: no abnormalities  (2023)    Gardasil:  unsure if received      OB History    This patient's OB History needs to be verified. Open OB History to review and resolve any issues.      5    Para   4    Term   4       0    AB   1    Living   4         SAB   0    IAB   0    Ectopic   0    Multiple   0    Live Births   4                 GYN History  Age of Menarche:14  Age at first pregnancy:18   Age at first live birth:18  Comments: No abnormal pap or STDs       ROS:     GENERAL: Denies unintentional weight gain or weight loss. Feeling well overall.   SKIN: Denies rash or lesions.   HEENT: Denies headaches, or vision changes.   CARDIOVASCULAR: Denies palpitations or chest pain.   RESPIRATORY: Denies shortness of breath or dyspnea on exertion.  BREASTS: Denies pain, lumps, or nipple discharge.   ABDOMEN: Denies abdominal pain, constipation, diarrhea, nausea, vomiting, change in appetite.  URINARY: Denies frequency, dysuria, hematuria.  NEUROLOGIC: Denies syncope or weakness.   PSYCHIATRIC: Denies depression, anxiety or mood swings.    Physical Exam:      PHYSICAL EXAM:  /66 (BP Location: Right arm, Patient Position: Sitting, BP Method: Medium (Manual))   Wt 65.9 kg (145 lb 6.3 oz)   LMP  (LMP Unknown)   BMI 26.59 kg/m²   Body mass index is 26.59 kg/m².     APPEARANCE: Well nourished, well  developed, in no acute distress.  PSYCH: Appropriate mood and affect.  SKIN: No acne or hirsutism  NECK: Neck symmetric without masses or thyromegaly  NODES: No inguinal, axillary, or supraclavicular lymph node enlargement  ABDOMEN: Soft.  No tenderness or masses.    CARDIOVASCULAR: No edema of peripheral extremities  BREASTS: Symmetrical, no skin changes or visible lesions.  No palpable masses or nipple discharge bilaterally.  PELVIC: Normal external genitalia without lesions.  Normal hair distribution.  Adequate perineal body, normal urethral meatus.  Vagina moist and well rugated without lesions or discharge.  Cervix pink, without lesions, discharge or tenderness. IUD string seen 3 cm outside of cervical os.   No significant cystocele or rectocele.  Bimanual exam shows uterus to be normal size, regular, mobile and nontender.  Adnexa without masses or tenderness.      Assessment/Plan:     Encounter for annual routine gynecological examination    Surveillance of intrauterine contraception    RTC 1 year    Counseling:     Patient was counseled today on current ASCCP pap guidelines, the recommendation for yearly pelvic exams, healthy diet and exercise routines, breast self awareness.She is to see her PCP for other health maintenance.     Use of the [employer] Patient Portal discussed and encouraged during today's visit.       Nancy Silva MD      As of April 1, 2021, the Cures Act has been passed nationally. This new law requires that all doctors progress notes, lab results, pathology reports and radiology reports be released IMMEDIATELY to the patient in the patient portal. That means that the results are released to you at the EXACT same time they are released to me. Therefore, with all of the patients that I have I am not able to reply to each patient exactly when the results come in. So there will be a delay from when you see the results to when I see them and have time to come up with a response to send you. Also  I only see these results when I am on the computer at work. So if the results come in over the weekend or after 5 pm of a work day, I will not see them until the next business day. As you can tell, this is a challenge as a physician to give every patient the quick response they hope for and deserve. So please be patient! Thanks for understanding, Dr. Silva

## 2024-03-11 NOTE — TELEPHONE ENCOUNTER
----- Message from Yusra Dominguez sent at 3/11/2024  9:06 AM CDT -----  Regarding: call back  Name of caller:beverley       What is the requesting detail: sooner appt.Pt requesting to been as soon as possible.Please advise       Can the clinic reply by [facility]:       What number to call back: [phone]

## 2024-03-11 NOTE — TELEPHONE ENCOUNTER
Pt has been scheduled with Ana Vizcaino for a Consult for an LEIGH injection.    ----- Message from Yusra Dominguez sent at 3/11/2024  9:06 AM CDT -----  Regarding: call back  Name of caller:beverley       What is the requesting detail: sooner appt.Pt requesting to been as soon as possible.Please advise       Can the clinic reply by [facility]:       What number to call back: [phone]

## 2024-03-13 ENCOUNTER — OFFICE VISIT (OUTPATIENT)
Dept: PAIN MEDICINE | Facility: CLINIC | Age: 39
End: 2024-03-13
Payer: COMMERCIAL

## 2024-03-13 VITALS
TEMPERATURE: 98 F | HEIGHT: 62 IN | WEIGHT: 147.69 LBS | OXYGEN SATURATION: 100 % | SYSTOLIC BLOOD PRESSURE: 106 MMHG | DIASTOLIC BLOOD PRESSURE: 70 MMHG | HEART RATE: 72 BPM | BODY MASS INDEX: 27.18 KG/M2 | RESPIRATION RATE: 18 BRPM

## 2024-03-13 DIAGNOSIS — G54.0 NEUROGENIC THORACIC OUTLET SYNDROME OF RIGHT BRACHIAL PLEXUS: ICD-10-CM

## 2024-03-13 DIAGNOSIS — G89.29 CHRONIC RIGHT SHOULDER PAIN: ICD-10-CM

## 2024-03-13 DIAGNOSIS — M99.51 INTERVERTEBRAL DISC STENOSIS OF NEURAL CANAL OF CERVICAL REGION: ICD-10-CM

## 2024-03-13 DIAGNOSIS — M25.511 CHRONIC RIGHT SHOULDER PAIN: ICD-10-CM

## 2024-03-13 DIAGNOSIS — M54.12 CERVICAL RADICULOPATHY: Primary | ICD-10-CM

## 2024-03-13 DIAGNOSIS — G89.4 CHRONIC PAIN SYNDROME: ICD-10-CM

## 2024-03-13 DIAGNOSIS — M79.18 MYOFASCIAL PAIN: ICD-10-CM

## 2024-03-13 DIAGNOSIS — M50.30 DDD (DEGENERATIVE DISC DISEASE), CERVICAL: ICD-10-CM

## 2024-03-13 PROCEDURE — 99999 PR PBB SHADOW E&M-EST. PATIENT-LVL IV: CPT | Mod: PBBFAC,,,

## 2024-03-13 PROCEDURE — 1159F MED LIST DOCD IN RCRD: CPT | Mod: CPTII,S$GLB,,

## 2024-03-13 PROCEDURE — 3008F BODY MASS INDEX DOCD: CPT | Mod: CPTII,S$GLB,,

## 2024-03-13 PROCEDURE — 3078F DIAST BP <80 MM HG: CPT | Mod: CPTII,S$GLB,,

## 2024-03-13 PROCEDURE — 99214 OFFICE O/P EST MOD 30 MIN: CPT | Mod: S$GLB,,,

## 2024-03-13 PROCEDURE — 3074F SYST BP LT 130 MM HG: CPT | Mod: CPTII,S$GLB,,

## 2024-03-13 PROCEDURE — 3044F HG A1C LEVEL LT 7.0%: CPT | Mod: CPTII,S$GLB,,

## 2024-03-13 PROCEDURE — 1160F RVW MEDS BY RX/DR IN RCRD: CPT | Mod: CPTII,S$GLB,,

## 2024-03-13 RX ORDER — METHOCARBAMOL 500 MG/1
500 TABLET, FILM COATED ORAL 2 TIMES DAILY PRN
Qty: 60 TABLET | Refills: 1 | Status: SHIPPED | OUTPATIENT
Start: 2024-03-13
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
## 2024-03-14 ENCOUNTER — TELEPHONE (OUTPATIENT)
Dept: PAIN MEDICINE | Facility: CLINIC | Age: 39
End: 2024-03-14
Payer: COMMERCIAL

## 2024-03-14 NOTE — TELEPHONE ENCOUNTER
----- Message from Nikia Braswell sent at 3/14/2024  1:19 PM CDT -----  Regarding: returnng call    Type:  Patient Returning Call    Who Called:pt    Who Left Message for Patient:    Does the patient know what this is regarding? Scheduling procedure    Best Call Back Number: [phone]    Additional Information:

## 2024-03-14 NOTE — TELEPHONE ENCOUNTER
Staff spoke with the pt and verbalized to her the procedure dept has up to 48 hours to get her scheduled. Pt understood.      ----- Message from Nikia Braswell sent at 3/14/2024  1:19 PM CDT -----  Regarding: returnng call    Type:  Patient Returning Call    Who Called:pt    Who Left Message for Patient:    Does the patient know what this is regarding? Scheduling procedure    Best Call Back Number: [phone]    Additional Information:

## 2024-03-21 ENCOUNTER — PATIENT MESSAGE (OUTPATIENT)
Dept: PAIN MEDICINE | Facility: OTHER | Age: 39
End: 2024-03-21
Payer: COMMERCIAL

## 2024-03-26 ENCOUNTER — PATIENT MESSAGE (OUTPATIENT)
Dept: ADMINISTRATIVE | Facility: OTHER | Age: 39
End: 2024-03-26
Payer: COMMERCIAL

## 2024-03-28 ENCOUNTER — PATIENT MESSAGE (OUTPATIENT)
Dept: ADMINISTRATIVE | Facility: OTHER | Age: 39
End: 2024-03-28
Payer: COMMERCIAL

## 2024-04-02 ENCOUNTER — HOSPITAL ENCOUNTER (OUTPATIENT)
Facility: OTHER | Age: 39
Discharge: HOME | End: 2024-04-02
Attending: ANESTHESIOLOGY | Admitting: ANESTHESIOLOGY
Payer: COMMERCIAL

## 2024-04-02 VITALS
HEART RATE: 63 BPM | TEMPERATURE: 99 F | BODY MASS INDEX: 26.13 KG/M2 | DIASTOLIC BLOOD PRESSURE: 55 MMHG | OXYGEN SATURATION: 98 % | RESPIRATION RATE: 16 BRPM | HEIGHT: 62 IN | WEIGHT: 142 LBS | SYSTOLIC BLOOD PRESSURE: 103 MMHG

## 2024-04-02 DIAGNOSIS — M54.12 CERVICAL RADICULOPATHY: ICD-10-CM

## 2024-04-02 DIAGNOSIS — M48.02 CERVICAL SPINAL STENOSIS: Primary | ICD-10-CM

## 2024-04-02 DIAGNOSIS — G89.29 CHRONIC PAIN: ICD-10-CM

## 2024-04-02 PROCEDURE — 62321 NJX INTERLAMINAR CRV/THRC: CPT | Mod: ,,, | Performed by: ANESTHESIOLOGY

## 2024-04-02 PROCEDURE — 62321 NJX INTERLAMINAR CRV/THRC: CPT | Performed by: ANESTHESIOLOGY

## 2024-04-02 PROCEDURE — 99152 MOD SED SAME PHYS/QHP 5/>YRS: CPT | Performed by: ANESTHESIOLOGY

## 2024-04-02 PROCEDURE — 63600175 PHARM REV CODE 636 W HCPCS: Performed by: ANESTHESIOLOGY

## 2024-04-02 PROCEDURE — 25000003 PHARM REV CODE 250: Performed by: ANESTHESIOLOGY

## 2024-04-02 PROCEDURE — 25500020 PHARM REV CODE 255: Performed by: ANESTHESIOLOGY

## 2024-04-02 PROCEDURE — 99152 MOD SED SAME PHYS/QHP 5/>YRS: CPT | Mod: ,,, | Performed by: ANESTHESIOLOGY

## 2024-04-02 RX ORDER — FENTANYL CITRATE 50 UG/ML
INJECTION, SOLUTION INTRAMUSCULAR; INTRAVENOUS
Status: DISCONTINUED | OUTPATIENT
Start: 2024-04-02 | End: 2024-04-02 | Stop reason: HOSPADM

## 2024-04-02 RX ORDER — SODIUM CHLORIDE 9 MG/ML
INJECTION, SOLUTION INTRAVENOUS CONTINUOUS
Status: ACTIVE | OUTPATIENT
Start: 2024-04-02

## 2024-04-02 RX ORDER — MIDAZOLAM HYDROCHLORIDE 1 MG/ML
INJECTION INTRAMUSCULAR; INTRAVENOUS
Status: DISCONTINUED | OUTPATIENT
Start: 2024-04-02 | End: 2024-04-02 | Stop reason: HOSPADM

## 2024-04-02 RX ORDER — LIDOCAINE HYDROCHLORIDE 10 MG/ML
INJECTION, SOLUTION EPIDURAL; INFILTRATION; INTRACAUDAL; PERINEURAL
Status: DISCONTINUED | OUTPATIENT
Start: 2024-04-02 | End: 2024-04-02 | Stop reason: HOSPADM

## 2024-04-02 RX ORDER — DEXAMETHASONE SODIUM PHOSPHATE 10 MG/ML
INJECTION INTRAMUSCULAR; INTRAVENOUS
Status: DISCONTINUED | OUTPATIENT
Start: 2024-04-02 | End: 2024-04-02 | Stop reason: HOSPADM

## 2024-04-02 RX ORDER — LIDOCAINE HYDROCHLORIDE 20 MG/ML
INJECTION, SOLUTION INFILTRATION; PERINEURAL
Status: DISCONTINUED | OUTPATIENT
Start: 2024-04-02 | End: 2024-04-02 | Stop reason: HOSPADM

## 2024-04-02 NOTE — OP NOTE
Cervical Interlaminar Epidural Steroid Injection under Fluoroscopic Guidance    The procedure, risks, benefits, and options were discussed with the patient. There are no contraindications to the procedure. The patent expressed understanding and agreed to the procedure. Informed written consent was obtained prior to the start of the procedure and can be found in the patient's chart.     PATIENT NAME: Hillary Hughes   MRN: [id number]     DATE OF PROCEDURE: 04/02/2024    PROCEDURE: Cervical Interlaminar Epidural Steroid Injection C7/T1 under Fluoroscopic Guidance    PRE-OP DIAGNOSIS: Cervical radiculopathy [M54.12] Cervical radiculopathy [M54.12]    POST-OP DIAGNOSIS: Same    PHYSICIAN: Sharon Gale MD     ASSISTANTS: Orlando Medel MD    MEDICATIONS INJECTED: Preservative-free Decadron 10mg with 1cc of Lidocaine 1% MPF and preservative free normal saline    LOCAL ANESTHETIC INJECTED: Xylocaine 2%     SEDATION: Versed 1.5 and Fentanyl 75mcg                                                                                                                                                                                     Conscious sedation ordered by M.D. Patient re-evaluation prior to administration of conscious sedation. No changes noted in patient's status from initial evaluation. The patient's vital signs were monitored by RN and patient remained hemodynamically stable throughout the procedure.    Event Time In   Sedation Start 1323   Sedation End 1334       ESTIMATED BLOOD LOSS: None    COMPLICATIONS: None    TECHNIQUE: Time-out was performed to identify the patient and procedure to be performed. With the patient laying in a prone position, the surgical area was prepped and draped in the usual sterile fashion using ChloraPrep and a fenestrated drape. The level was determined under fluoroscopy guidance. Skin anesthesia was achieved by injecting Lidocaine 2% over the injection site.  The interlaminar space was then  approached with a 20 gauge, 3.5 inch Tuohy needle that was introduced under fluoroscopic guidance with AP, lateral and/or contralateral oblique imaging. Once the Ligamentum flavum was encountered loss of resistance to air was used to enter the epidural space. With positive loss of resistance and negative aspiration for CSF or Blood, contrast dye  Omnipaque (300mg/mL) was injected to confirm placement and there was no vascular runoff. Then 5 mL of the medication mixture listed above was then injected slowly. Displacement of the radio opaque contrast after injection of the medication confirmed that the medication went into the area of the epidural space. The needles were removed, and bleeding was nil. A sterile dressing was applied. No specimens collected. The patient tolerated the procedure well.       The patient was monitored after the procedure in the recovery area. They were given post-procedure and discharge instructions to follow at home. The patient was discharged in a stable condition.      Orlando Medel MD

## 2024-04-02 NOTE — DISCHARGE INSTRUCTIONS

## 2024-04-02 NOTE — DISCHARGE SUMMARY
Discharge Note  Short Stay      SUMMARY     Admit Date: 4/2/2024    Attending Physician: Orlando Medel      Discharge Physician: rOlando Medel      Discharge Date: 4/2/2024 1:36 PM    Procedure(s) (LRB):  CERVICAL C7/T1 IL LEIGH (N/A)    Final Diagnosis: Cervical radiculopathy [M54.12]    Disposition: Home or self care    Patient Instructions:   Current Discharge Medication List        CONTINUE these medications which have NOT CHANGED    Details   albuterol (PROVENTIL/VENTOLIN HFA) 90 mcg/actuation inhaler Inhale 2 puffs into the lungs every 6 (six) hours as needed for Wheezing. Rescue  Qty: 18 g, Refills: 11    Associated Diagnoses: SOB (shortness of breath)      famotidine (PEPCID) 40 MG tablet Take 1 tablet (40 mg total) by mouth once daily.  Qty: 30 tablet, Refills: 2    Associated Diagnoses: Dyspepsia      LIDOcaine (LIDODERM) 5 % Place 1 patch onto the skin once daily. Remove & Discard patch within 12 hours or as directed by MD  Qty: 30 patch, Refills: 6    Associated Diagnoses: Neurogenic thoracic outlet syndrome of right brachial plexus      methocarbamoL (ROBAXIN) 500 MG Tab Take 1 tablet (500 mg total) by mouth 2 (two) times daily as needed (for muscle spasms).  Qty: 60 tablet, Refills: 1    Associated Diagnoses: Cervical radiculopathy; Neurogenic thoracic outlet syndrome of right brachial plexus      ondansetron (ZOFRAN-ODT) 4 MG TbDL Take 1 tablet (4 mg total) by mouth every 8 (eight) hours as needed.  Qty: 30 tablet, Refills: 0                 Discharge Diagnosis: Cervical radiculopathy [M54.12]  Condition on Discharge: Stable with no complications to procedure   Diet on Discharge: Same as before.  Activity: as per instruction sheet.  Discharge to: Home with a responsible adult.  Follow up: 2-4 weeks       Please call my office or pager at [phone] if experienced any weakness or loss of sensation, fever > 101.5, pain uncontrolled with oral medications, persistent nausea/vomiting/or diarrhea, redness or  drainage from the incisions, or any other worrisome concerns. If physician on call was not reached or could not communicate with our office for any reason please go to the nearest emergency department

## 2024-04-04 ENCOUNTER — PATIENT MESSAGE (OUTPATIENT)
Dept: ADMINISTRATIVE | Facility: OTHER | Age: 39
End: 2024-04-04
Payer: COMMERCIAL

## 2024-04-10 DIAGNOSIS — R10.13 DYSPEPSIA: ICD-10-CM

## 2024-04-10 RX ORDER — FAMOTIDINE 40 MG/1
40 TABLET, FILM COATED ORAL
Qty: 90 TABLET | Refills: 3 | Status: SHIPPED | OUTPATIENT
Start: 2024-04-10

## 2024-04-17 ENCOUNTER — OFFICE VISIT (OUTPATIENT)
Dept: PAIN MEDICINE | Facility: CLINIC | Age: 39
End: 2024-04-17
Payer: COMMERCIAL

## 2024-04-17 DIAGNOSIS — M54.12 CERVICAL RADICULOPATHY: Primary | ICD-10-CM

## 2024-04-17 DIAGNOSIS — M79.18 MYOFASCIAL PAIN: ICD-10-CM

## 2024-04-17 PROCEDURE — 99214 OFFICE O/P EST MOD 30 MIN: CPT | Mod: 95,,, | Performed by: ANESTHESIOLOGY

## 2024-04-17 PROCEDURE — 3044F HG A1C LEVEL LT 7.0%: CPT | Mod: CPTII,95,, | Performed by: ANESTHESIOLOGY
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
## 2024-08-05 ENCOUNTER — HOSPITAL ENCOUNTER (OUTPATIENT)
Dept: RADIOLOGY | Facility: HOSPITAL | Age: 39
Discharge: HOME | End: 2024-08-05
Attending: NURSE PRACTITIONER
Payer: COMMERCIAL

## 2024-08-05 ENCOUNTER — OFFICE VISIT (OUTPATIENT)
Dept: INTERNAL MEDICINE | Facility: CLINIC | Age: 39
End: 2024-08-05
Payer: COMMERCIAL

## 2024-08-05 DIAGNOSIS — R20.0 NUMBNESS OF TOES: Primary | ICD-10-CM

## 2024-08-05 DIAGNOSIS — R20.0 NUMBNESS OF TOES: ICD-10-CM

## 2024-08-05 PROCEDURE — 73630 X-RAY EXAM OF FOOT: CPT | Mod: 26,RT,, | Performed by: RADIOLOGY

## 2024-08-05 PROCEDURE — 1159F MED LIST DOCD IN RCRD: CPT | Mod: CPTII,95,, | Performed by: NURSE PRACTITIONER

## 2024-08-05 PROCEDURE — 3044F HG A1C LEVEL LT 7.0%: CPT | Mod: CPTII,95,, | Performed by: NURSE PRACTITIONER

## 2024-08-05 PROCEDURE — 73630 X-RAY EXAM OF FOOT: CPT | Mod: TC,RT

## 2024-08-05 PROCEDURE — 99213 OFFICE O/P EST LOW 20 MIN: CPT | Mod: 95,,, | Performed by: NURSE PRACTITIONER

## 2024-08-05 PROCEDURE — 1160F RVW MEDS BY RX/DR IN RCRD: CPT | Mod: CPTII,95,, | Performed by: NURSE PRACTITIONER

## 2024-08-05 RX ORDER — IBUPROFEN 800 MG/1
800 TABLET ORAL 3 TIMES DAILY PRN
Qty: 90 TABLET | Refills: 3 | Status: SHIPPED | OUTPATIENT
Start: 2024-08-05

## 2024-08-08 ENCOUNTER — OFFICE VISIT (OUTPATIENT)
Dept: PODIATRY | Facility: CLINIC | Age: 39
End: 2024-08-08
Payer: COMMERCIAL

## 2024-08-08 VITALS
BODY MASS INDEX: 26.13 KG/M2 | HEIGHT: 62 IN | HEART RATE: 75 BPM | SYSTOLIC BLOOD PRESSURE: 122 MMHG | DIASTOLIC BLOOD PRESSURE: 76 MMHG | WEIGHT: 142 LBS

## 2024-08-08 DIAGNOSIS — M21.41 HYPERMOBILE PES PLANUS OF RIGHT FOOT: ICD-10-CM

## 2024-08-08 DIAGNOSIS — R20.0 NUMBNESS OF TOES: ICD-10-CM

## 2024-08-08 DIAGNOSIS — M24.573 EQUINUS CONTRACTURE OF ANKLE: ICD-10-CM

## 2024-08-08 DIAGNOSIS — M79.2 NEURITIS: Primary | ICD-10-CM

## 2024-08-08 PROCEDURE — 99999 PR PBB SHADOW E&M-EST. PATIENT-LVL III: CPT | Mod: PBBFAC,,, | Performed by: PODIATRIST

## 2024-09-26 ENCOUNTER — OFFICE VISIT (OUTPATIENT)
Dept: PODIATRY | Facility: CLINIC | Age: 39
End: 2024-09-26
Payer: COMMERCIAL

## 2024-09-26 VITALS — BODY MASS INDEX: 26.13 KG/M2 | HEIGHT: 62 IN | WEIGHT: 142 LBS

## 2024-09-26 DIAGNOSIS — M21.41 HYPERMOBILE PES PLANUS OF RIGHT FOOT: ICD-10-CM

## 2024-09-26 DIAGNOSIS — R20.0 NUMBNESS OF TOES: ICD-10-CM

## 2024-09-26 DIAGNOSIS — M24.573 EQUINUS CONTRACTURE OF ANKLE: ICD-10-CM

## 2024-09-26 DIAGNOSIS — M79.2 NEURITIS: Primary | ICD-10-CM

## 2024-09-26 PROCEDURE — 1159F MED LIST DOCD IN RCRD: CPT | Mod: CPTII,S$GLB,, | Performed by: PODIATRIST

## 2024-09-26 PROCEDURE — 1160F RVW MEDS BY RX/DR IN RCRD: CPT | Mod: CPTII,S$GLB,, | Performed by: PODIATRIST

## 2024-09-26 PROCEDURE — 99213 OFFICE O/P EST LOW 20 MIN: CPT | Mod: S$GLB,,, | Performed by: PODIATRIST

## 2024-09-26 PROCEDURE — 3008F BODY MASS INDEX DOCD: CPT | Mod: CPTII,S$GLB,, | Performed by: PODIATRIST

## 2024-09-26 PROCEDURE — 3044F HG A1C LEVEL LT 7.0%: CPT | Mod: CPTII,S$GLB,, | Performed by: PODIATRIST

## 2024-09-26 PROCEDURE — 99999 PR PBB SHADOW E&M-EST. PATIENT-LVL III: CPT | Mod: PBBFAC,,, | Performed by: PODIATRIST

## 2024-09-26 NOTE — PROGRESS NOTES
Subjective:      Patient ID: Hillary Hughes is a 39 y.o. female.    Chief Complaint: Follow-up (Toe numbness)    Stinging and numbness of the plantar aspect 3rd toe of the right foot.  Gradual onset, worsening over the past few weeks.  Aggravated with increased weight-bearing prolonged standing some shoes.  Stretches over-the-counter inserts, athletic shoes, activity modification have all helped pretty well without complete resolution at this point.  Not at goal.   Denies trauma and surgery right foot.  X-rays right foot on August 5, 2024 independent read shows no acute injury, well-maintained joint spaces.  Excellent alignment architecture bone.  No gas, no foreign body, no osteolysis    Review of Systems   Constitutional: Negative for chills, diaphoresis, fever, malaise/fatigue and night sweats.   Cardiovascular:  Negative for claudication, cyanosis, leg swelling and syncope.   Skin:  Negative for color change, dry skin, nail changes, rash, suspicious lesions and unusual hair distribution.   Musculoskeletal:  Negative for falls, joint pain, joint swelling, muscle cramps, muscle weakness and stiffness.   Gastrointestinal:  Negative for constipation, diarrhea, nausea and vomiting.   Neurological:  Positive for numbness. Negative for brief paralysis, disturbances in coordination, focal weakness, paresthesias, sensory change and tremors.           Objective:      Physical Exam  Constitutional:       General: She is not in acute distress.     Appearance: She is well-developed. She is not diaphoretic.   Cardiovascular:      Pulses:           Popliteal pulses are 2+ on the right side and 2+ on the left side.        Dorsalis pedis pulses are 2+ on the right side and 2+ on the left side.        Posterior tibial pulses are 2+ on the right side and 2+ on the left side.      Comments: Capillary refill 3 seconds all toes/distal feet, all toes/both feet warm to touch.      Negative lymphadenopathy bilateral popliteal fossa and  tarsal tunnel.      Negavie lower extremity edema bilateral.    Musculoskeletal:      Right ankle: No swelling, deformity, ecchymosis or lacerations. Normal range of motion. Normal pulse.      Right Achilles Tendon: Normal. No defects. Vivas's test negative.      Comments: Hyper mobile MTPJ, STJ hyper mobile 1st ray with a about 15 mm dorsal displacement from neutral.      Ankle dorsiflexion decreased at <10 degrees bilateral with moderate increase with knee flexion bilateral.       Lymphadenopathy:      Lower Body: No right inguinal adenopathy. No left inguinal adenopathy.      Comments: Negative lymphadenopathy bilateral popliteal fossa and tarsal tunnel.    Negative lymphangitic streaking bilateral feet/ankles/legs.   Skin:     General: Skin is dry.      Capillary Refill: Capillary refill takes 2 to 3 seconds.      Coloration: Skin is not pale.      Findings: No abrasion, bruising, burn, ecchymosis, erythema, laceration, lesion or rash.      Nails: There is no clubbing.      Comments:   Skin is normal age and health appropriate color, turgor, texture, and temperature bilateral lower extremities without ulceration, hyperpigmentation, discoloration, masses nodules or cords palpated.  No ecchymosis, erythema, edema, or cardinal signs of infection bilateral lower extremities.    Neurological:      Mental Status: She is alert and oriented to person, place, and time.      Sensory: No sensory deficit.      Motor: No tremor, atrophy or abnormal muscle tone.      Gait: Gait normal.      Comments:   Negative moulder sign/click bilateral all intermetatarsal spaces.    Negative tinel sign to percussion sural, superficial peroneal, deep peroneal, saphenous, and posterior tibial nerves right and left ankles and feet.     Psychiatric:         Behavior: Behavior is cooperative.             Assessment:       Encounter Diagnoses   Name Primary?    Neuritis Yes    Equinus contracture of ankle     Hypermobile pes planus of right  foot     Numbness of toes          Plan:       Hillary was seen today for follow-up.    Diagnoses and all orders for this visit:    Neuritis  -     ORTHOTIC DEVICE (DME)  -     Ambulatory referral/consult to Physical/Occupational Therapy; Future  -     SPLINT FOR HOME USE    Equinus contracture of ankle  -     ORTHOTIC DEVICE (DME)  -     Ambulatory referral/consult to Physical/Occupational Therapy; Future  -     SPLINT FOR HOME USE    Hypermobile pes planus of right foot  -     ORTHOTIC DEVICE (DME)  -     Ambulatory referral/consult to Physical/Occupational Therapy; Future  -     SPLINT FOR HOME USE    Numbness of toes  -     ORTHOTIC DEVICE (DME)  -     SPLINT FOR HOME USE      I counseled the patient on her conditions, their implications and medical management.        Continue stretches, inserts, athletic shoes as much as possible.  Activity to tolerance.      Dispense night splint.      Prescribed physical therapy custom orthotics.      Declines scheduled follow-up pending resolution with current level of treatment.            No follow-ups on file.

## 2024-10-30 ENCOUNTER — TELEPHONE (OUTPATIENT)
Dept: VASCULAR SURGERY | Facility: CLINIC | Age: 39
End: 2024-10-30
Payer: COMMERCIAL

## 2024-10-30 ENCOUNTER — PATIENT MESSAGE (OUTPATIENT)
Dept: PAIN MEDICINE | Facility: CLINIC | Age: 39
End: 2024-10-30
Payer: COMMERCIAL

## 2024-11-01 ENCOUNTER — OFFICE VISIT (OUTPATIENT)
Dept: PAIN MEDICINE | Facility: CLINIC | Age: 39
End: 2024-11-01
Payer: COMMERCIAL

## 2024-11-01 VITALS
DIASTOLIC BLOOD PRESSURE: 76 MMHG | OXYGEN SATURATION: 98 % | SYSTOLIC BLOOD PRESSURE: 122 MMHG | HEART RATE: 75 BPM | WEIGHT: 143.94 LBS | RESPIRATION RATE: 18 BRPM | TEMPERATURE: 99 F | BODY MASS INDEX: 26.33 KG/M2

## 2024-11-01 DIAGNOSIS — M54.12 CERVICAL RADICULOPATHY: Primary | ICD-10-CM

## 2024-11-01 PROCEDURE — 99999 PR PBB SHADOW E&M-EST. PATIENT-LVL III: CPT | Mod: PBBFAC,,, | Performed by: ANESTHESIOLOGY

## 2024-11-01 RX ORDER — PREGABALIN 50 MG/1
50 CAPSULE ORAL 2 TIMES DAILY
Qty: 60 CAPSULE | Refills: 1 | Status: SHIPPED | OUTPATIENT
Start: 2024-11-01
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
## 2024-11-04 ENCOUNTER — PATIENT MESSAGE (OUTPATIENT)
Dept: PAIN MEDICINE | Facility: OTHER | Age: 39
End: 2024-11-04
Payer: COMMERCIAL

## 2024-11-19 ENCOUNTER — PATIENT MESSAGE (OUTPATIENT)
Dept: ADMINISTRATIVE | Facility: OTHER | Age: 39
End: 2024-11-19
Payer: COMMERCIAL

## 2024-11-19 ENCOUNTER — HOSPITAL ENCOUNTER (OUTPATIENT)
Facility: OTHER | Age: 39
Discharge: HOME | End: 2024-11-19
Attending: ANESTHESIOLOGY | Admitting: ANESTHESIOLOGY
Payer: COMMERCIAL

## 2024-11-19 VITALS
HEIGHT: 62 IN | HEART RATE: 62 BPM | RESPIRATION RATE: 16 BRPM | TEMPERATURE: 98 F | WEIGHT: 149 LBS | BODY MASS INDEX: 27.42 KG/M2 | DIASTOLIC BLOOD PRESSURE: 64 MMHG | SYSTOLIC BLOOD PRESSURE: 108 MMHG | OXYGEN SATURATION: 99 %

## 2024-11-19 DIAGNOSIS — G89.29 CHRONIC PAIN: ICD-10-CM

## 2024-11-19 DIAGNOSIS — G54.0 NEUROGENIC THORACIC OUTLET SYNDROME OF RIGHT BRACHIAL PLEXUS: Primary | ICD-10-CM

## 2024-11-19 DIAGNOSIS — M54.12 CERVICAL RADICULOPATHY: ICD-10-CM

## 2024-11-19 PROCEDURE — 25000003 PHARM REV CODE 250: Performed by: ANESTHESIOLOGY

## 2024-11-19 PROCEDURE — 62321 NJX INTERLAMINAR CRV/THRC: CPT | Mod: ,,, | Performed by: ANESTHESIOLOGY

## 2024-11-19 PROCEDURE — 63600175 PHARM REV CODE 636 W HCPCS: Performed by: ANESTHESIOLOGY

## 2024-11-19 PROCEDURE — 25500020 PHARM REV CODE 255: Performed by: ANESTHESIOLOGY

## 2024-11-19 PROCEDURE — 62321 NJX INTERLAMINAR CRV/THRC: CPT | Performed by: ANESTHESIOLOGY

## 2024-11-19 PROCEDURE — A4216 STERILE WATER/SALINE, 10 ML: HCPCS | Performed by: ANESTHESIOLOGY

## 2024-11-19 RX ORDER — SODIUM CHLORIDE 9 MG/ML
INJECTION, SOLUTION INTRAMUSCULAR; INTRAVENOUS; SUBCUTANEOUS
Status: DISCONTINUED | OUTPATIENT
Start: 2024-11-19 | End: 2024-11-19 | Stop reason: HOSPADM

## 2024-11-19 RX ORDER — FENTANYL CITRATE 50 UG/ML
INJECTION, SOLUTION INTRAMUSCULAR; INTRAVENOUS
Status: DISCONTINUED | OUTPATIENT
Start: 2024-11-19 | End: 2024-11-19 | Stop reason: HOSPADM

## 2024-11-19 RX ORDER — MIDAZOLAM HYDROCHLORIDE 1 MG/ML
INJECTION INTRAMUSCULAR; INTRAVENOUS
Status: DISCONTINUED | OUTPATIENT
Start: 2024-11-19 | End: 2024-11-19 | Stop reason: HOSPADM

## 2024-11-19 RX ORDER — SODIUM CHLORIDE 9 MG/ML
INJECTION, SOLUTION INTRAVENOUS CONTINUOUS
Status: DISCONTINUED | OUTPATIENT
Start: 2024-11-19 | End: 2024-11-19 | Stop reason: HOSPADM

## 2024-11-19 RX ORDER — LIDOCAINE HYDROCHLORIDE 20 MG/ML
INJECTION, SOLUTION INFILTRATION; PERINEURAL
Status: DISCONTINUED | OUTPATIENT
Start: 2024-11-19 | End: 2024-11-19 | Stop reason: HOSPADM

## 2024-11-19 RX ORDER — DEXAMETHASONE SODIUM PHOSPHATE 10 MG/ML
INJECTION INTRAMUSCULAR; INTRAVENOUS
Status: DISCONTINUED | OUTPATIENT
Start: 2024-11-19 | End: 2024-11-19 | Stop reason: HOSPADM

## 2024-11-19 RX ORDER — LIDOCAINE HYDROCHLORIDE 10 MG/ML
INJECTION, SOLUTION EPIDURAL; INFILTRATION; INTRACAUDAL; PERINEURAL
Status: DISCONTINUED | OUTPATIENT
Start: 2024-11-19 | End: 2024-11-19 | Stop reason: HOSPADM

## 2024-11-19 NOTE — OP NOTE
Cervical Interlaminar Epidural Steroid Injection under Fluoroscopic Guidance    The procedure, risks, benefits, and options were discussed with the patient. There are no contraindications to the procedure. The patent expressed understanding and agreed to the procedure. Informed written consent was obtained prior to the start of the procedure and can be found in the patient's chart.     PATIENT NAME: Hillary Hughes   MRN: [id number]     DATE OF PROCEDURE: 11/19/2024    PROCEDURE: Cervical Interlaminar Epidural Steroid Injection C7/T1 under Fluoroscopic Guidance    PRE-OP DIAGNOSIS: Cervical radiculopathy [M54.12] Cervical radiculopathy [M54.12]    POST-OP DIAGNOSIS: Same    PHYSICIAN: Sharon Gale MD     ASSISTANTS: Rudy Diaz MD    MEDICATIONS INJECTED: Preservative-free Decadron 10mg with 1cc of Lidocaine 1% MPF and preservative free normal saline    LOCAL ANESTHETIC INJECTED: Xylocaine 2%     SEDATION: Versed 1.5mg and Fentanyl 50mcg                                                                                                                                                                                     Conscious sedation ordered by M.BETTY. Patient re-evaluation prior to administration of conscious sedation. No changes noted in patient's status from initial evaluation. The patient's vital signs were monitored by RN and patient remained hemodynamically stable throughout the procedure.    Event Time In   Sedation Start 1000   Sedation End 1007       ESTIMATED BLOOD LOSS: None    COMPLICATIONS: None    TECHNIQUE: Time-out was performed to identify the patient and procedure to be performed. With the patient laying in a prone position, the surgical area was prepped and draped in the usual sterile fashion using ChloraPrep and a fenestrated drape. The level was determined under fluoroscopy guidance. Skin anesthesia was achieved by injecting Lidocaine 2% over the injection site.  The interlaminar space was then  approached with a 20 gauge, 3.5 inch Tuohy needle that was introduced under fluoroscopic guidance with AP, lateral and/or contralateral oblique imaging. Once the Ligamentum flavum was encountered loss of resistance to air was used to enter the epidural space. With positive loss of resistance and negative aspiration for CSF or Blood, contrast dye  Omnipaque (300mg/mL) was injected to confirm placement and there was no vascular runoff. Then 2 mL of the medication mixture listed above was then injected slowly. Displacement of the radio opaque contrast after injection of the medication confirmed that the medication went into the area of the epidural space. The needles were removed, and bleeding was nil. A sterile dressing was applied. No specimens collected. The patient tolerated the procedure well.       The patient was monitored after the procedure in the recovery area. They were given post-procedure and discharge instructions to follow at home. The patient was discharged in a stable condition.    I reviewed and edited the fellow's note. I conducted my own interview and physical examination. I agree with the findings. I was present and supervising all critical portions of the procedure.    Rudy Diaz MD

## 2024-11-19 NOTE — DISCHARGE SUMMARY
Discharge Note  Short Stay      SUMMARY     Admit Date: 11/19/2024    Attending Physician: Rudy Diaz      Discharge Physician: Rudy Diaz      Discharge Date: 11/19/2024 10:08 AM    Procedure(s) (LRB):  CERVICAL C7/T1 LEIGH (TO THE RIGHT) (N/A)    Final Diagnosis: Cervical radiculopathy [M54.12]    Disposition: Home or self care    Patient Instructions:   Current Discharge Medication List        CONTINUE these medications which have NOT CHANGED    Details   albuterol (PROVENTIL/VENTOLIN HFA) 90 mcg/actuation inhaler Inhale 2 puffs into the lungs every 6 (six) hours as needed for Wheezing. Rescue  Qty: 18 g, Refills: 11    Associated Diagnoses: SOB (shortness of breath)      ibuprofen (ADVIL,MOTRIN) 800 MG tablet Take 1 tablet (800 mg total) by mouth 3 (three) times daily as needed for Pain.  Qty: 90 tablet, Refills: 3    Associated Diagnoses: Numbness of toes      LIDOcaine (LIDODERM) 5 % Place 1 patch onto the skin once daily. Remove & Discard patch within 12 hours or as directed by MD  Qty: 30 patch, Refills: 6    Associated Diagnoses: Neurogenic thoracic outlet syndrome of right brachial plexus      methocarbamoL (ROBAXIN) 500 MG Tab Take 1 tablet (500 mg total) by mouth 2 (two) times daily as needed (for muscle spasms).  Qty: 60 tablet, Refills: 1    Associated Diagnoses: Cervical radiculopathy; Neurogenic thoracic outlet syndrome of right brachial plexus      ondansetron (ZOFRAN-ODT) 4 MG TbDL Take 1 tablet (4 mg total) by mouth every 8 (eight) hours as needed.  Qty: 30 tablet, Refills: 0      pregabalin (LYRICA) 50 MG capsule Take 1 capsule (50 mg total) by mouth 2 (two) times daily.  Qty: 60 capsule, Refills: 1    Associated Diagnoses: Cervical radiculopathy                 Discharge Diagnosis: Cervical radiculopathy [M54.12]  Condition on Discharge: Stable with no complications to procedure   Diet on Discharge: Same as before.  Activity: as per instruction sheet.  Discharge to: Home with a responsible  adult.  Follow up: 2-4 weeks       Please call my office or pager at [phone] if experienced any weakness or loss of sensation, fever > 101.5, pain uncontrolled with oral medications, persistent nausea/vomiting/or diarrhea, redness or drainage from the incisions, or any other worrisome concerns. If physician on call was not reached or could not communicate with our office for any reason please go to the nearest emergency department

## 2024-11-19 NOTE — DISCHARGE INSTRUCTIONS

## 2024-11-23 ENCOUNTER — PATIENT MESSAGE (OUTPATIENT)
Dept: ADMINISTRATIVE | Facility: OTHER | Age: 39
End: 2024-11-23
Payer: COMMERCIAL

## 2024-12-04 ENCOUNTER — OFFICE VISIT (OUTPATIENT)
Dept: PAIN MEDICINE | Facility: CLINIC | Age: 39
End: 2024-12-04
Payer: COMMERCIAL

## 2024-12-04 VITALS
SYSTOLIC BLOOD PRESSURE: 104 MMHG | OXYGEN SATURATION: 98 % | RESPIRATION RATE: 18 BRPM | HEART RATE: 71 BPM | TEMPERATURE: 99 F | WEIGHT: 149.06 LBS | BODY MASS INDEX: 27.26 KG/M2 | DIASTOLIC BLOOD PRESSURE: 62 MMHG

## 2024-12-04 DIAGNOSIS — M54.12 CERVICAL RADICULOPATHY: Primary | ICD-10-CM

## 2024-12-04 DIAGNOSIS — M50.30 DDD (DEGENERATIVE DISC DISEASE), CERVICAL: ICD-10-CM

## 2024-12-04 PROCEDURE — 99214 OFFICE O/P EST MOD 30 MIN: CPT | Mod: S$GLB,,, | Performed by: ANESTHESIOLOGY

## 2024-12-04 PROCEDURE — 3074F SYST BP LT 130 MM HG: CPT | Mod: CPTII,S$GLB,, | Performed by: ANESTHESIOLOGY

## 2024-12-04 PROCEDURE — 3008F BODY MASS INDEX DOCD: CPT | Mod: CPTII,S$GLB,, | Performed by: ANESTHESIOLOGY

## 2024-12-04 PROCEDURE — 3044F HG A1C LEVEL LT 7.0%: CPT | Mod: CPTII,S$GLB,, | Performed by: ANESTHESIOLOGY

## 2024-12-04 PROCEDURE — 99999 PR PBB SHADOW E&M-EST. PATIENT-LVL III: CPT | Mod: PBBFAC,,, | Performed by: ANESTHESIOLOGY

## 2024-12-04 PROCEDURE — 3078F DIAST BP <80 MM HG: CPT | Mod: CPTII,S$GLB,, | Performed by: ANESTHESIOLOGY

## 2024-12-04 PROCEDURE — 1159F MED LIST DOCD IN RCRD: CPT | Mod: CPTII,S$GLB,, | Performed by: ANESTHESIOLOGY
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
## 2025-01-06 ENCOUNTER — PATIENT MESSAGE (OUTPATIENT)
Dept: PAIN MEDICINE | Facility: CLINIC | Age: 40
End: 2025-01-06
Payer: COMMERCIAL

## 2025-02-03 ENCOUNTER — OFFICE VISIT (OUTPATIENT)
Dept: VASCULAR SURGERY | Facility: CLINIC | Age: 40
End: 2025-02-03
Attending: SURGERY
Payer: COMMERCIAL

## 2025-02-03 VITALS
HEIGHT: 62 IN | TEMPERATURE: 98 F | DIASTOLIC BLOOD PRESSURE: 70 MMHG | SYSTOLIC BLOOD PRESSURE: 110 MMHG | BODY MASS INDEX: 27.18 KG/M2 | HEART RATE: 62 BPM | WEIGHT: 147.69 LBS

## 2025-02-03 DIAGNOSIS — G54.0 NEUROGENIC THORACIC OUTLET SYNDROME OF RIGHT BRACHIAL PLEXUS: Primary | ICD-10-CM

## 2025-02-03 PROCEDURE — 1159F MED LIST DOCD IN RCRD: CPT | Mod: CPTII,S$GLB,, | Performed by: SURGERY

## 2025-02-03 PROCEDURE — 3008F BODY MASS INDEX DOCD: CPT | Mod: CPTII,S$GLB,, | Performed by: SURGERY

## 2025-02-03 PROCEDURE — 3074F SYST BP LT 130 MM HG: CPT | Mod: CPTII,S$GLB,, | Performed by: SURGERY

## 2025-02-03 PROCEDURE — 99999 PR PBB SHADOW E&M-EST. PATIENT-LVL III: CPT | Mod: PBBFAC,,, | Performed by: SURGERY

## 2025-02-03 PROCEDURE — 99214 OFFICE O/P EST MOD 30 MIN: CPT | Mod: S$GLB,,, | Performed by: SURGERY

## 2025-02-03 PROCEDURE — 3078F DIAST BP <80 MM HG: CPT | Mod: CPTII,S$GLB,, | Performed by: SURGERY

## 2025-02-03 NOTE — PROGRESS NOTES
VASCULAR SURGERY NOTE    Patient ID: Hillary Hughes is a 39 y.o. female.    I. HISTORY     Chief Complaint: right arm/hand pain, concern for neurogenic TOS    HPI: Hillary Hughes is a 39 y.o. female who is here today for established patient appointment. At previous visits I wrote the followin: She has learned to manage her symptoms and her arm still goes numb when she over-exerts her arm. She often does yoga and pilates for exercise. She gets pain in her upper back/lower neck with jogging. She still gets some tingling and numbness when she over-exerts but in general she doesn't get numbness as long as she stops the inciting activity before it starts. She still can't smell and can't taste ever since getting COVID.    2/3/25: She started taking lyrica recently and it has helped her sleep. She was numbness and pain that would worsen whenever she would sleep. She still feels like the dull pain is there on a constant basis in her right arm. She has some pain in her right neck as well. Felt like someone frogged her in the neck and it was just worsening and that's when she started the lyrica. She has been doing some yoga for exercise but the neck pain prevents her from continuing to do it for very long. She can't box or jog because of the symptoms. She also has intermittent numbness in her fingers. She sometimes gets pain on the left side of her neck as well but that's only when symptoms are at their worse. She did have injection with Nj Machuca in 2024 which she says did help her symptoms some but did not fully resolve them.     Medications: reviewed in EMR    Past Medical History:   Diagnosis Date    Concussion     H/O concussion     History of urinary calculi     Kidney infection     kidney stones aand kidney infection 2012/and 13    Kidney stones     Thoracic outlet syndrome         Past Surgical History:   Procedure Laterality Date    BREAST AUGMENTATION  2011     SECTION  2004     Boy     SECTION  2010    Boy     SECTION  2015    Boy     SECTION  2018    Girl    CHOLECYSTECTOMY  2012    CYSTOSCOPY W/ RETROGRADES Bilateral 2019    Procedure: CYSTOSCOPY, WITH RETROGRADE PYELOGRAM;  Surgeon: Vipul Sharma MD;  Location: [facility] OR [address] [place] [address];  Service: Urology;  Laterality: Bilateral;    DILATION AND CURETTAGE OF UTERUS  2005    TAB    EPIDURAL STEROID INJECTION N/A 5/3/2022    Procedure: INJECTION, STEROID, EPIDURAL, C7-T1;  Surgeon: Sharon Gale MD;  Location: [facility] PAIN MGT;  Service: Pain Management;  Laterality: N/A;    EPIDURAL STEROID INJECTION N/A 2024    Procedure: CERVICAL C7/T1 IL LEIGH;  Surgeon: Sharon Gale MD;  Location: [facility] PAIN MGT;  Service: Pain Management;  Laterality: N/A;  [phone]  2 WK F/U JOSE    EPIDURAL STEROID INJECTION N/A 2024    Procedure: CERVICAL C7/T1 LEIGH (TO THE RIGHT);  Surgeon: Sharon Gale MD;  Location: [facility] PAIN MGT;  Service: Pain Management;  Laterality: N/A;  [phone]  2 WK F/U MINESH    URETEROSCOPY Right 2019    Procedure: URETEROSCOPY;  Surgeon: Vipul Sharma MD;  Location: [facility] OR [address] [place] [address];  Service: Urology;  Laterality: Right;  1hr     Social History     Occupational History    Not on file   Tobacco Use    Smoking status: Never    Smokeless tobacco: Never   Substance and Sexual Activity    Alcohol use: No    Drug use: No    Sexual activity: Yes     Partners: Male     Birth control/protection: None     Comment: :          Review of Systems   Constitutional: Negative for weight loss.   HENT:  Negative for ear pain and nosebleeds.    Eyes:  Negative for discharge and pain.   Cardiovascular:  Negative for chest pain and palpitations.   Respiratory:  Negative for cough, shortness of breath and wheezing.    Endocrine: Negative for cold intolerance, heat intolerance and polyphagia.   Hematologic/Lymphatic: Negative for adenopathy. Does not bruise/bleed  easily.   Skin:  Negative for itching and rash.   Musculoskeletal:  Negative for joint swelling and muscle cramps.   Gastrointestinal:  Negative for abdominal pain, diarrhea, nausea and vomiting.   Genitourinary:  Negative for dysuria and flank pain.   Neurological:  Negative for numbness and seizures.         II. PHYSICAL EXAM     Physical Exam  Constitutional:       General: She is not in acute distress.     Appearance: Normal appearance. She is not ill-appearing or diaphoretic.   HENT:      Head: Normocephalic and atraumatic.   Eyes:      General: No scleral icterus.        Right eye: No discharge.         Left eye: No discharge.      Extraocular Movements: Extraocular movements intact.      Conjunctiva/sclera: Conjunctivae normal.   Cardiovascular:      Rate and Rhythm: Normal rate and regular rhythm.      Pulses: Normal pulses.   Pulmonary:      Effort: Pulmonary effort is normal. No respiratory distress.   Musculoskeletal:         General: Normal range of motion.   Skin:     General: Skin is warm and dry.   Neurological:      General: No focal deficit present.      Mental Status: She is alert and oriented to person, place, and time.   Psychiatric:         Mood and Affect: Mood normal.         Behavior: Behavior normal.     Scalene triangle:  RIGHT: + local/peripheral symptoms  LEFT: asymptomatic    Pec Minor: negative    EAST: Right arm [id number]. Left arm 3min.-- decreased right hand coordination after completing    QuickDash Score 2/3/25: 70 (previously 45) (previously 61) (previously 56) (previously 54)      III. ASSESSMENT & PLAN (MEDICAL DECISION MAKING)     1. Neurogenic thoracic outlet syndrome of right brachial plexus          Imaging Results:  CXR: normal first ribs bilaterally      Assessment/Diagnosis and Plan:  39 y.o. female with concern for right neurogenic thoracic outlet syndrome. I had an extensive discussion with her regarding the symptoms, diagnosis and treatment options. Her symptoms have  worsened significantly based on history as well as her QuickDASH score today. She is having difficulty with prolonged seated tasks at work. She does have a standing desk and I encouraged her to use this as well. At this point I offered right 1st rib resection to help improve her symptoms. Although it may not completely resolve her symptoms, I think it would certainly improve them from where they are now. If patient would like to postpone this until at least the summer.  We did discuss the possibility that her symptoms could worsen. She expressed understanding and would like to return in the summer to discuss possible right first rib resection.    -RTC July 2025 to discuss possible right first rib resection -- sooner if symptoms worsen despite below measures  -Recommend stretching, PT/OT massage etc for continued medical treatment of NTOS    GNj Isaac II, MD, VI  Vascular Surgery  [facility]

## 2025-02-10 ENCOUNTER — PATIENT MESSAGE (OUTPATIENT)
Dept: PAIN MEDICINE | Facility: CLINIC | Age: 40
End: 2025-02-10
Payer: COMMERCIAL

## 2025-02-10 ENCOUNTER — PATIENT MESSAGE (OUTPATIENT)
Dept: VASCULAR SURGERY | Facility: CLINIC | Age: 40
End: 2025-02-10
Payer: COMMERCIAL

## 2025-02-14 ENCOUNTER — OFFICE VISIT (OUTPATIENT)
Dept: PAIN MEDICINE | Facility: CLINIC | Age: 40
End: 2025-02-14
Payer: COMMERCIAL

## 2025-02-14 VITALS
HEART RATE: 86 BPM | WEIGHT: 147.69 LBS | OXYGEN SATURATION: 98 % | DIASTOLIC BLOOD PRESSURE: 76 MMHG | BODY MASS INDEX: 27.02 KG/M2 | TEMPERATURE: 99 F | RESPIRATION RATE: 18 BRPM | SYSTOLIC BLOOD PRESSURE: 116 MMHG

## 2025-02-14 DIAGNOSIS — R29.898 WEAKNESS OF RIGHT ARM: ICD-10-CM

## 2025-02-14 DIAGNOSIS — M99.51 INTERVERTEBRAL DISC STENOSIS OF NEURAL CANAL OF CERVICAL REGION: ICD-10-CM

## 2025-02-14 DIAGNOSIS — M54.12 CERVICAL RADICULOPATHY: Primary | ICD-10-CM

## 2025-02-14 DIAGNOSIS — G54.0 NEUROGENIC THORACIC OUTLET SYNDROME OF RIGHT BRACHIAL PLEXUS: ICD-10-CM

## 2025-02-14 PROCEDURE — 99999 PR PBB SHADOW E&M-EST. PATIENT-LVL V: CPT | Mod: PBBFAC,,, | Performed by: ANESTHESIOLOGY

## 2025-02-14 RX ORDER — PREGABALIN 25 MG/1
25 CAPSULE ORAL DAILY
Qty: 30 CAPSULE | Refills: 1 | Status: SHIPPED | OUTPATIENT
Start: 2025-02-14
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
## 2025-02-17 ENCOUNTER — TELEPHONE (OUTPATIENT)
Facility: CLINIC | Age: 40
End: 2025-02-17
Payer: COMMERCIAL

## 2025-02-17 NOTE — TELEPHONE ENCOUNTER
Spoke to Pt about her virtual appt. Pt was scheduled for arm weakness. Pt was informed her appt scheduled incorrectly. Appt has been canceled. Pt self scheduled with Dr. Callejas in April. Appt has been added the wait list.

## 2025-02-27 ENCOUNTER — OFFICE VISIT (OUTPATIENT)
Dept: NEUROLOGY | Facility: CLINIC | Age: 40
End: 2025-02-27
Payer: COMMERCIAL

## 2025-02-27 DIAGNOSIS — R29.898 RIGHT ARM WEAKNESS: ICD-10-CM

## 2025-02-27 DIAGNOSIS — M79.601 RIGHT ARM PAIN: Primary | ICD-10-CM

## 2025-02-27 DIAGNOSIS — M54.2 NECK PAIN: ICD-10-CM
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
## 2025-02-28 ENCOUNTER — OFFICE VISIT (OUTPATIENT)
Dept: INTERNAL MEDICINE | Facility: CLINIC | Age: 40
End: 2025-02-28
Payer: COMMERCIAL

## 2025-02-28 VITALS
OXYGEN SATURATION: 99 % | HEART RATE: 72 BPM | BODY MASS INDEX: 27.83 KG/M2 | SYSTOLIC BLOOD PRESSURE: 102 MMHG | WEIGHT: 151.25 LBS | TEMPERATURE: 99 F | HEIGHT: 62 IN | RESPIRATION RATE: 18 BRPM | DIASTOLIC BLOOD PRESSURE: 62 MMHG

## 2025-02-28 DIAGNOSIS — Z00.00 WELL ADULT EXAM: Primary | ICD-10-CM

## 2025-02-28 DIAGNOSIS — Z23 NEED FOR VACCINATION: ICD-10-CM

## 2025-02-28 DIAGNOSIS — G54.0 NEUROGENIC THORACIC OUTLET SYNDROME OF RIGHT BRACHIAL PLEXUS: ICD-10-CM

## 2025-02-28 PROCEDURE — 99999 PR PBB SHADOW E&M-EST. PATIENT-LVL IV: CPT | Mod: PBBFAC,,, | Performed by: FAMILY MEDICINE

## 2025-02-28 NOTE — PROGRESS NOTES
Subjective     Patient ID: Hillary Hughes is a 39 y.o. female.    Chief Complaint: Annual Exam    HPI 39-year-old female presents to clinic today for annual physical exam.  She has been followed by sports Medicine, vascular Medicine, Neurology, and pain management secondary to chronic cervical pain with suspicion of neurologic thoracic outlet syndrome.  She is currently on Lyrica with stability of symptoms.  She reports a past surgical history of , cholecystectomy, D&C, breast augmentation, and cystoscopy.  She reports a family history of her father having diabetes and hypertension.  Her mother has anemia.  Flu vaccine has been discussed and given today.  Review of Systems   Constitutional:  Positive for activity change. Negative for appetite change, chills, fatigue, fever and unexpected weight change.   HENT:  Negative for nasal congestion, ear pain, hearing loss, postnasal drip, rhinorrhea, sinus pressure/congestion, sore throat, tinnitus and trouble swallowing.    Eyes:  Negative for discharge, redness, itching and visual disturbance.   Respiratory:  Negative for cough, chest tightness, shortness of breath and wheezing.    Cardiovascular:  Negative for chest pain and palpitations.   Gastrointestinal:  Negative for abdominal pain, blood in stool, constipation, diarrhea, nausea and vomiting.   Endocrine: Negative for polydipsia and polyuria.   Genitourinary:  Negative for decreased urine volume, difficulty urinating, dysuria, frequency, hematuria, menstrual problem and urgency.   Musculoskeletal:  Positive for neck pain. Negative for arthralgias, back pain, joint swelling, myalgias and neck stiffness.   Integumentary:  Negative for rash.   Neurological:  Negative for dizziness, weakness, light-headedness and headaches.   Psychiatric/Behavioral: Negative.  Negative for confusion and dysphoric mood.           Objective     Physical Exam  Vitals and nursing note reviewed.   Constitutional:       General: She  is not in acute distress.     Appearance: She is well-developed. She is not diaphoretic.   HENT:      Head: Normocephalic and atraumatic.      Right Ear: External ear normal.      Left Ear: External ear normal.      Nose: Nose normal.      Mouth/Throat:      Pharynx: No oropharyngeal exudate.   Eyes:      General: No scleral icterus.        Right eye: No discharge.         Left eye: No discharge.      Conjunctiva/sclera: Conjunctivae normal.      Pupils: Pupils are equal, round, and reactive to light.   Neck:      Thyroid: No thyromegaly.      Vascular: No JVD.      Trachea: No tracheal deviation.   Cardiovascular:      Rate and Rhythm: Normal rate and regular rhythm.      Heart sounds: Normal heart sounds. No murmur heard.     No friction rub. No gallop.   Pulmonary:      Effort: Pulmonary effort is normal. No respiratory distress.      Breath sounds: Normal breath sounds. No stridor. No wheezing or rales.   Abdominal:      General: Bowel sounds are normal. There is no distension.      Palpations: Abdomen is soft. There is no mass.      Tenderness: There is no abdominal tenderness. There is no guarding or rebound.   Musculoskeletal:         General: No tenderness. Normal range of motion.      Cervical back: Normal range of motion and neck supple.   Lymphadenopathy:      Cervical: No cervical adenopathy.   Skin:     General: Skin is warm and dry.      Coloration: Skin is not pale.      Findings: No erythema or rash.   Neurological:      Mental Status: She is alert and oriented to person, place, and time.   Psychiatric:         Behavior: Behavior normal.         Thought Content: Thought content normal.         Judgment: Judgment normal.            Assessment and Plan     1. Well adult exam  -     CBC Auto Differential; Future; Expected date: 02/28/2025  -     Comprehensive Metabolic Panel; Future; Expected date: 02/28/2025  -     Lipid Panel; Future; Expected date: 02/28/2025  -     T4, Free; Future; Expected date:  02/28/2025  -     TSH; Future; Expected date: 02/28/2025  -     Hemoglobin A1C; Future; Expected date: 02/28/2025  -     Urinalysis, Reflex to Urine Culture Urine, Clean Catch; Future; Expected date: 02/28/2025    2. Neurogenic thoracic outlet syndrome of right brachial plexus    3. Need for vaccination        1. CBC, CMP, UA, TSH, free T4, fasting lipids, and hemoglobin A1c.    2. Continue Lyrica as prescribed and continue follow-up with neurology, pain management, and vascular Medicine as scheduled.  Neurogenic thoracic outlet syndrome of the right brachial plexus remains stable.    3. Flu vaccine given.  4. Return to clinic as needed or in 1 year for annual physical exam.             No follow-ups on file.

## 2025-03-05 ENCOUNTER — PATIENT MESSAGE (OUTPATIENT)
Dept: NEUROLOGY | Facility: CLINIC | Age: 40
End: 2025-03-05
Payer: COMMERCIAL

## 2025-03-07 ENCOUNTER — TELEPHONE (OUTPATIENT)
Facility: CLINIC | Age: 40
End: 2025-03-07
Payer: COMMERCIAL

## 2025-03-07 NOTE — TELEPHONE ENCOUNTER
Left a message for patient informing they have been scheduled an appointment for NP on 5/26 @ 9am.  And asked to call back to reschedule if that day/time does not work for them

## 2025-03-10 ENCOUNTER — PATIENT MESSAGE (OUTPATIENT)
Dept: NEUROLOGY | Facility: CLINIC | Age: 40
End: 2025-03-10
Payer: COMMERCIAL

## 2025-03-12 ENCOUNTER — TELEPHONE (OUTPATIENT)
Dept: NEUROLOGY | Facility: CLINIC | Age: 40
End: 2025-03-12
Payer: COMMERCIAL

## 2025-03-12 ENCOUNTER — PATIENT MESSAGE (OUTPATIENT)
Dept: NEUROLOGY | Facility: CLINIC | Age: 40
End: 2025-03-12
Payer: COMMERCIAL

## 2025-03-12 DIAGNOSIS — G54.0 BRACHIAL PLEXUS DISORDERS: ICD-10-CM

## 2025-03-12 DIAGNOSIS — G54.0 NEUROGENIC THORACIC OUTLET SYNDROME OF RIGHT BRACHIAL PLEXUS: Primary | ICD-10-CM

## 2025-03-12 NOTE — TELEPHONE ENCOUNTER
Spoke with pt, around 3 pm re: MRI Brachial plexus denial. Assured her we'd work on getting approval and follow up by tomorrow late morning.    Reached out to pre service team Leonel Benedict and Agusto Garrett. CPT 56743 is pulling into chart when it should be [id number].     Attempted to unlink referral, place new order, and re-link new order. Still, [id number] automatically populates. Messaged operational admin for support. Will reach out to [employer] support in am. Message sent to colleague who may be able to assist.     Updated pt via portal

## 2025-03-12 NOTE — TELEPHONE ENCOUNTER
----- Message from Sherry sent at 3/12/2025  2:37 PM CDT -----  Regarding: MRI Order advise  CARLOS ALBERTO ULRICH calling regarding Patient Advice (message) for # pt is calling to speak with nurse regarding MRI update the procedure code

## 2025-03-13 ENCOUNTER — TELEPHONE (OUTPATIENT)
Dept: NEUROLOGY | Facility: CLINIC | Age: 40
End: 2025-03-13
Payer: COMMERCIAL

## 2025-03-14 DIAGNOSIS — G54.0 NEUROGENIC THORACIC OUTLET SYNDROME OF RIGHT BRACHIAL PLEXUS: Primary | ICD-10-CM

## 2025-03-14 DIAGNOSIS — R29.898 RIGHT ARM WEAKNESS: ICD-10-CM

## 2025-03-14 DIAGNOSIS — G54.0 BRACHIAL PLEXUS DISORDERS: ICD-10-CM

## 2025-03-14 DIAGNOSIS — M79.601 RIGHT ARM PAIN: ICD-10-CM

## 2025-03-15 ENCOUNTER — LAB VISIT (OUTPATIENT)
Dept: LAB | Facility: HOSPITAL | Age: 40
End: 2025-03-15
Attending: FAMILY MEDICINE
Payer: COMMERCIAL

## 2025-03-15 DIAGNOSIS — Z00.00 WELL ADULT EXAM: ICD-10-CM

## 2025-03-15 LAB
ALBUMIN SERPL BCP-MCNC: 3.6 G/DL (ref 3.5–5.2)
ALP SERPL-CCNC: 50 U/L (ref 40–150)
ALT SERPL W/O P-5'-P-CCNC: 11 U/L (ref 10–44)
ANION GAP SERPL CALC-SCNC: 8 MMOL/L (ref 8–16)
AST SERPL-CCNC: 17 U/L (ref 10–40)
BASOPHILS # BLD AUTO: 0.08 K/UL (ref 0–0.2)
BASOPHILS NFR BLD: 1.2 % (ref 0–1.9)
BILIRUB SERPL-MCNC: 0.4 MG/DL (ref 0.1–1)
BUN SERPL-MCNC: 9 MG/DL (ref 6–20)
CALCIUM SERPL-MCNC: 8.9 MG/DL (ref 8.7–10.5)
CHLORIDE SERPL-SCNC: 106 MMOL/L (ref 95–110)
CHOLEST SERPL-MCNC: 139 MG/DL (ref 120–199)
CHOLEST/HDLC SERPL: 2.5 {RATIO} (ref 2–5)
CO2 SERPL-SCNC: 24 MMOL/L (ref 23–29)
CREAT SERPL-MCNC: 0.7 MG/DL (ref 0.5–1.4)
DIFFERENTIAL METHOD BLD: ABNORMAL
EOSINOPHIL # BLD AUTO: 0.1 K/UL (ref 0–0.5)
EOSINOPHIL NFR BLD: 2.1 % (ref 0–8)
ERYTHROCYTE [DISTWIDTH] IN BLOOD BY AUTOMATED COUNT: 11.8 % (ref 11.5–14.5)
EST. GFR  (NO RACE VARIABLE): >60 ML/MIN/1.73 M^2
ESTIMATED AVG GLUCOSE: 103 MG/DL (ref 68–131)
GLUCOSE SERPL-MCNC: 88 MG/DL (ref 70–110)
HBA1C MFR BLD: 5.2 % (ref 4–5.6)
HCT VFR BLD AUTO: 36.1 % (ref 37–48.5)
HDLC SERPL-MCNC: 55 MG/DL (ref 40–75)
HDLC SERPL: 39.6 % (ref 20–50)
HGB BLD-MCNC: 11.9 G/DL (ref 12–16)
IMM GRANULOCYTES # BLD AUTO: 0.01 K/UL (ref 0–0.04)
IMM GRANULOCYTES NFR BLD AUTO: 0.2 % (ref 0–0.5)
LDLC SERPL CALC-MCNC: 70 MG/DL (ref 63–159)
LYMPHOCYTES # BLD AUTO: 1.9 K/UL (ref 1–4.8)
LYMPHOCYTES NFR BLD: 29 % (ref 18–48)
MCH RBC QN AUTO: 31.9 PG (ref 27–31)
MCHC RBC AUTO-ENTMCNC: 33 G/DL (ref 32–36)
MCV RBC AUTO: 97 FL (ref 82–98)
MONOCYTES # BLD AUTO: 0.5 K/UL (ref 0.3–1)
MONOCYTES NFR BLD: 7.4 % (ref 4–15)
NEUTROPHILS # BLD AUTO: 4 K/UL (ref 1.8–7.7)
NEUTROPHILS NFR BLD: 60.1 % (ref 38–73)
NONHDLC SERPL-MCNC: 84 MG/DL
NRBC BLD-RTO: 0 /100 WBC
PLATELET # BLD AUTO: 257 K/UL (ref 150–450)
PMV BLD AUTO: 10.8 FL (ref 9.2–12.9)
POTASSIUM SERPL-SCNC: 4 MMOL/L (ref 3.5–5.1)
PROT SERPL-MCNC: 6.7 G/DL (ref 6–8.4)
RBC # BLD AUTO: 3.73 M/UL (ref 4–5.4)
SODIUM SERPL-SCNC: 138 MMOL/L (ref 136–145)
T4 FREE SERPL-MCNC: 0.89 NG/DL (ref 0.71–1.51)
TRIGL SERPL-MCNC: 70 MG/DL (ref 30–150)
TSH SERPL DL<=0.005 MIU/L-ACNC: 1.06 UIU/ML (ref 0.4–4)
WBC # BLD AUTO: 6.63 K/UL (ref 3.9–12.7)

## 2025-03-15 PROCEDURE — 36415 COLL VENOUS BLD VENIPUNCTURE: CPT | Mod: PO | Performed by: FAMILY MEDICINE

## 2025-03-15 PROCEDURE — 80053 COMPREHEN METABOLIC PANEL: CPT | Performed by: FAMILY MEDICINE

## 2025-03-15 PROCEDURE — 84443 ASSAY THYROID STIM HORMONE: CPT | Performed by: FAMILY MEDICINE

## 2025-03-15 PROCEDURE — 83036 HEMOGLOBIN GLYCOSYLATED A1C: CPT | Performed by: FAMILY MEDICINE

## 2025-03-15 PROCEDURE — 84439 ASSAY OF FREE THYROXINE: CPT | Performed by: FAMILY MEDICINE

## 2025-03-15 PROCEDURE — 80061 LIPID PANEL: CPT | Performed by: FAMILY MEDICINE

## 2025-03-15 PROCEDURE — 85025 COMPLETE CBC W/AUTO DIFF WBC: CPT | Performed by: FAMILY MEDICINE

## 2025-03-16 ENCOUNTER — RESULTS FOLLOW-UP (OUTPATIENT)
Dept: INTERNAL MEDICINE | Facility: CLINIC | Age: 40
End: 2025-03-16

## 2025-03-19 ENCOUNTER — TELEPHONE (OUTPATIENT)
Dept: NEUROLOGY | Facility: CLINIC | Age: 40
End: 2025-03-19
Payer: COMMERCIAL

## 2025-03-19 DIAGNOSIS — M54.12 CERVICAL RADICULOPATHY: ICD-10-CM

## 2025-03-19 RX ORDER — PREGABALIN 25 MG/1
25 CAPSULE ORAL DAILY
Qty: 30 CAPSULE | Refills: 1 | Status: SHIPPED | OUTPATIENT
Start: 2025-03-19

## 2025-03-19 RX ORDER — PREGABALIN 50 MG/1
50 CAPSULE ORAL 2 TIMES DAILY
Qty: 60 CAPSULE | Refills: 1 | Status: SHIPPED | OUTPATIENT
Start: 2025-03-19

## 2025-03-19 NOTE — TELEPHONE ENCOUNTER
Spoke with [facility] rep and was given case #[id number] per Coby SOSA. 10:56 am 3/19/25  Since DOS is tomorrow, we began a new case and manually added the correct diagnosis code of G54.0, [place] [address], and NPI of [id number]   Clinic note requested fro addition to case file; message sent to pre-service via referral message AND email.

## 2025-03-25 ENCOUNTER — TELEPHONE (OUTPATIENT)
Dept: NEUROLOGY | Facility: CLINIC | Age: 40
End: 2025-03-25
Payer: COMMERCIAL

## 2025-03-25 NOTE — TELEPHONE ENCOUNTER
----- Message from Sherry sent at 3/20/2025  1:56 PM CDT -----  Regarding: MRI Advise  CARLOS ALBERTO ULRICH calling regarding Patient Advice (message) for # pt is calling to speak with someone in clinic regarding her MRI pls advise

## 2025-03-29 DIAGNOSIS — R29.898 RIGHT ARM WEAKNESS: ICD-10-CM

## 2025-03-29 DIAGNOSIS — M79.601 RIGHT ARM PAIN: ICD-10-CM

## 2025-03-29 DIAGNOSIS — G54.0 NEUROGENIC THORACIC OUTLET SYNDROME OF RIGHT BRACHIAL PLEXUS: Primary | ICD-10-CM

## 2025-04-04 ENCOUNTER — OFFICE VISIT (OUTPATIENT)
Dept: OBSTETRICS AND GYNECOLOGY | Facility: CLINIC | Age: 40
End: 2025-04-04
Payer: COMMERCIAL

## 2025-04-04 VITALS
BODY MASS INDEX: 27.55 KG/M2 | WEIGHT: 150.63 LBS | SYSTOLIC BLOOD PRESSURE: 104 MMHG | DIASTOLIC BLOOD PRESSURE: 70 MMHG

## 2025-04-04 DIAGNOSIS — N90.89 LESION OF LABIA: ICD-10-CM

## 2025-04-04 DIAGNOSIS — Z01.419 ENCOUNTER FOR ANNUAL ROUTINE GYNECOLOGICAL EXAMINATION: Primary | ICD-10-CM

## 2025-04-04 DIAGNOSIS — R30.0 BURNING WITH URINATION: ICD-10-CM

## 2025-04-04 DIAGNOSIS — Z30.431 SURVEILLANCE OF INTRAUTERINE CONTRACEPTION: ICD-10-CM

## 2025-04-04 DIAGNOSIS — R30.0 BURNING WITH URINATION: Primary | ICD-10-CM

## 2025-04-04 LAB
BILIRUB SERPL-MCNC: NEGATIVE MG/DL
BLOOD URINE, POC: NORMAL
CLARITY, POC UA: CLEAR
COLOR, POC UA: NORMAL
GLUCOSE UR QL STRIP: NORMAL
KETONES UR QL STRIP: NEGATIVE
LEUKOCYTE ESTERASE URINE, POC: NEGATIVE
NITRITE, POC UA: NEGATIVE
PH, POC UA: 5
PROTEIN, POC: NORMAL
SPECIFIC GRAVITY, POC UA: 1
UROBILINOGEN, POC UA: NORMAL

## 2025-04-04 PROCEDURE — 1159F MED LIST DOCD IN RCRD: CPT | Mod: CPTII,S$GLB,, | Performed by: OBSTETRICS & GYNECOLOGY

## 2025-04-04 PROCEDURE — 3078F DIAST BP <80 MM HG: CPT | Mod: CPTII,S$GLB,, | Performed by: OBSTETRICS & GYNECOLOGY

## 2025-04-04 PROCEDURE — 99395 PREV VISIT EST AGE 18-39: CPT | Mod: 25,S$GLB,, | Performed by: OBSTETRICS & GYNECOLOGY

## 2025-04-04 PROCEDURE — 88305 TISSUE EXAM BY PATHOLOGIST: CPT | Mod: TC | Performed by: OBSTETRICS & GYNECOLOGY

## 2025-04-04 PROCEDURE — 56605 BIOPSY OF VULVA/PERINEUM: CPT | Mod: S$GLB,,, | Performed by: OBSTETRICS & GYNECOLOGY

## 2025-04-04 PROCEDURE — 99999 PR PBB SHADOW E&M-EST. PATIENT-LVL III: CPT | Mod: PBBFAC,,, | Performed by: OBSTETRICS & GYNECOLOGY

## 2025-04-04 PROCEDURE — 81002 URINALYSIS NONAUTO W/O SCOPE: CPT | Mod: S$GLB,,, | Performed by: OBSTETRICS & GYNECOLOGY

## 2025-04-04 PROCEDURE — 87591 N.GONORRHOEAE DNA AMP PROB: CPT | Performed by: OBSTETRICS & GYNECOLOGY

## 2025-04-04 PROCEDURE — 3044F HG A1C LEVEL LT 7.0%: CPT | Mod: CPTII,S$GLB,, | Performed by: OBSTETRICS & GYNECOLOGY

## 2025-04-04 PROCEDURE — 1160F RVW MEDS BY RX/DR IN RCRD: CPT | Mod: CPTII,S$GLB,, | Performed by: OBSTETRICS & GYNECOLOGY

## 2025-04-04 PROCEDURE — 88305 TISSUE EXAM BY PATHOLOGIST: CPT | Mod: 26,,, | Performed by: PATHOLOGY

## 2025-04-04 PROCEDURE — 3008F BODY MASS INDEX DOCD: CPT | Mod: CPTII,S$GLB,, | Performed by: OBSTETRICS & GYNECOLOGY

## 2025-04-04 PROCEDURE — 3074F SYST BP LT 130 MM HG: CPT | Mod: CPTII,S$GLB,, | Performed by: OBSTETRICS & GYNECOLOGY

## 2025-04-06 ENCOUNTER — PATIENT MESSAGE (OUTPATIENT)
Dept: INTERNAL MEDICINE | Facility: CLINIC | Age: 40
End: 2025-04-06
Payer: COMMERCIAL

## 2025-04-06 DIAGNOSIS — E66.3 OVERWEIGHT (BMI 25.0-29.9): Primary | ICD-10-CM

## 2025-04-07 ENCOUNTER — PATIENT MESSAGE (OUTPATIENT)
Dept: OBSTETRICS AND GYNECOLOGY | Facility: CLINIC | Age: 40
End: 2025-04-07
Payer: COMMERCIAL

## 2025-04-08 LAB
ESTROGEN SERPL-MCNC: NORMAL PG/ML
INSULIN SERPL-ACNC: NORMAL U[IU]/ML
LAB AP DIAGNOSIS CATEGORY: NORMAL
LAB AP GROSS DESCRIPTION: NORMAL
LAB AP PERFORMING LOCATION(S): NORMAL
LAB AP REPORT FOOTNOTES: NORMAL

## 2025-04-09 ENCOUNTER — OFFICE VISIT (OUTPATIENT)
Dept: OBSTETRICS AND GYNECOLOGY | Facility: CLINIC | Age: 40
End: 2025-04-09
Payer: COMMERCIAL

## 2025-04-09 VITALS — BODY MASS INDEX: 27.78 KG/M2 | WEIGHT: 151.88 LBS

## 2025-04-09 DIAGNOSIS — R29.898 WEAKNESS OF RIGHT ARM: ICD-10-CM

## 2025-04-09 DIAGNOSIS — G54.0 NEUROGENIC THORACIC OUTLET SYNDROME OF RIGHT BRACHIAL PLEXUS: ICD-10-CM

## 2025-04-09 DIAGNOSIS — N90.89 VULVAR BLEEDING: Primary | ICD-10-CM

## 2025-04-09 DIAGNOSIS — M79.601 RIGHT ARM PAIN: Primary | ICD-10-CM

## 2025-04-09 DIAGNOSIS — G54.0 BRACHIAL PLEXUS DISORDERS: ICD-10-CM

## 2025-04-09 PROCEDURE — 99213 OFFICE O/P EST LOW 20 MIN: CPT | Mod: S$GLB,,, | Performed by: OBSTETRICS & GYNECOLOGY

## 2025-04-09 PROCEDURE — 99999 PR PBB SHADOW E&M-EST. PATIENT-LVL III: CPT | Mod: PBBFAC,,, | Performed by: OBSTETRICS & GYNECOLOGY

## 2025-04-09 PROCEDURE — 1159F MED LIST DOCD IN RCRD: CPT | Mod: CPTII,S$GLB,, | Performed by: OBSTETRICS & GYNECOLOGY

## 2025-04-09 PROCEDURE — 3008F BODY MASS INDEX DOCD: CPT | Mod: CPTII,S$GLB,, | Performed by: OBSTETRICS & GYNECOLOGY

## 2025-04-09 PROCEDURE — 1160F RVW MEDS BY RX/DR IN RCRD: CPT | Mod: CPTII,S$GLB,, | Performed by: OBSTETRICS & GYNECOLOGY

## 2025-04-09 PROCEDURE — 3044F HG A1C LEVEL LT 7.0%: CPT | Mod: CPTII,S$GLB,, | Performed by: OBSTETRICS & GYNECOLOGY

## 2025-04-09 NOTE — TELEPHONE ENCOUNTER
Pt thinks the sac and stitch came out earlier right before the bleeding started.      Will come in this afternoon.

## 2025-04-09 NOTE — TELEPHONE ENCOUNTER
Pt states biopsy site is dripping blood after wiping.  In 15 minutes changed dressing twice.  She has been holding pressure without improvement.  She was scheduled this AM for an appt but she woke up with vomiting/stomach bug so she canceled and r/s to Friday.      Do you want her to come in to the office now?  Do you have any recs to stop bleeding?

## 2025-04-10 ENCOUNTER — TELEPHONE (OUTPATIENT)
Dept: NEUROLOGY | Facility: CLINIC | Age: 40
End: 2025-04-10
Payer: COMMERCIAL

## 2025-04-10 NOTE — TELEPHONE ENCOUNTER
Spoke with the patient. Apologized, again, for delay in care. Offered next available MRI appointment but she has scheduling concerns, asked fro 4/17 or 4/21. [facility] is able to accommodate. Pt accepted next available 4/21 at 10 am, verbalized understanding, and repeated back date/time/location of scheduled appointment.

## 2025-04-14 LAB
C TRACH DNA SPEC QL NAA+PROBE: NOT DETECTED
CTGC SOURCE (OHS) ORD-325: NORMAL
N GONORRHOEA DNA UR QL NAA+PROBE: NOT DETECTED

## 2025-04-21 ENCOUNTER — HOSPITAL ENCOUNTER (OUTPATIENT)
Dept: RADIOLOGY | Facility: HOSPITAL | Age: 40
Discharge: HOME | End: 2025-04-21
Attending: PSYCHIATRY & NEUROLOGY
Payer: COMMERCIAL

## 2025-04-21 ENCOUNTER — TELEPHONE (OUTPATIENT)
Dept: BARIATRICS | Facility: CLINIC | Age: 40
End: 2025-04-21
Payer: COMMERCIAL

## 2025-04-21 DIAGNOSIS — R29.898 WEAKNESS OF RIGHT ARM: ICD-10-CM

## 2025-04-21 DIAGNOSIS — M79.601 RIGHT ARM PAIN: ICD-10-CM

## 2025-04-21 DIAGNOSIS — G54.0 BRACHIAL PLEXUS DISORDERS: ICD-10-CM

## 2025-04-21 DIAGNOSIS — G54.0 NEUROGENIC THORACIC OUTLET SYNDROME OF RIGHT BRACHIAL PLEXUS: ICD-10-CM

## 2025-04-21 PROCEDURE — 73218 MRI UPPER EXTREMITY W/O DYE: CPT | Mod: TC

## 2025-04-21 PROCEDURE — 73218 MRI UPPER EXTREMITY W/O DYE: CPT | Mod: 26,,, | Performed by: RADIOLOGY

## 2025-04-21 NOTE — TELEPHONE ENCOUNTER
----- Message from Viv sent at 4/21/2025  1:10 PM CDT -----  Type:  Patient Returning CallWho Called:pt Who Left Message for Patient:pt Does the patient know what this is regarding?:call to see when is the next appt  or what is the next step Would the patient rather a call back or a response via [facility]? [facility] Back Number:[phone] Information: call back

## 2025-04-22 ENCOUNTER — RESULTS FOLLOW-UP (OUTPATIENT)
Dept: NEUROLOGY | Facility: HOSPITAL | Age: 40
End: 2025-04-22

## 2025-04-26 ENCOUNTER — RESULTS FOLLOW-UP (OUTPATIENT)
Dept: OBSTETRICS AND GYNECOLOGY | Facility: CLINIC | Age: 40
End: 2025-04-26

## 2025-04-30 ENCOUNTER — TELEPHONE (OUTPATIENT)
Facility: CLINIC | Age: 40
End: 2025-04-30
Payer: COMMERCIAL

## 2025-04-30 NOTE — TELEPHONE ENCOUNTER
Spoke to patient to schedule appointment EMG on 5/20 @ 11am. And clinic appt at 2pm. Patient verbalized agreement

## 2025-05-20 ENCOUNTER — PROCEDURE VISIT (OUTPATIENT)
Facility: CLINIC | Age: 40
End: 2025-05-20
Payer: COMMERCIAL

## 2025-05-20 ENCOUNTER — OFFICE VISIT (OUTPATIENT)
Facility: CLINIC | Age: 40
End: 2025-05-20
Payer: COMMERCIAL

## 2025-05-20 VITALS
HEART RATE: 59 BPM | DIASTOLIC BLOOD PRESSURE: 69 MMHG | BODY MASS INDEX: 27.67 KG/M2 | SYSTOLIC BLOOD PRESSURE: 108 MMHG | HEIGHT: 62 IN | WEIGHT: 150.38 LBS

## 2025-05-20 DIAGNOSIS — M79.601 RIGHT ARM PAIN: ICD-10-CM

## 2025-05-20 DIAGNOSIS — G56.41 COMPLEX REGIONAL PAIN SYNDROME TYPE 2 OF RIGHT UPPER EXTREMITY: Primary | ICD-10-CM

## 2025-05-20 DIAGNOSIS — M54.2 NECK PAIN: ICD-10-CM

## 2025-05-20 DIAGNOSIS — G90.511 COMPLEX REGIONAL PAIN SYNDROME TYPE 1 OF RIGHT UPPER EXTREMITY: ICD-10-CM

## 2025-05-20 DIAGNOSIS — R29.898 RIGHT ARM WEAKNESS: ICD-10-CM

## 2025-05-20 PROCEDURE — 99999 PR PBB SHADOW E&M-EST. PATIENT-LVL III: CPT | Mod: PBBFAC,,, | Performed by: PSYCHIATRY & NEUROLOGY

## 2025-05-20 PROCEDURE — 1160F RVW MEDS BY RX/DR IN RCRD: CPT | Mod: CPTII,S$GLB,, | Performed by: PSYCHIATRY & NEUROLOGY

## 2025-05-20 PROCEDURE — 1159F MED LIST DOCD IN RCRD: CPT | Mod: CPTII,S$GLB,, | Performed by: PSYCHIATRY & NEUROLOGY

## 2025-05-20 PROCEDURE — 3074F SYST BP LT 130 MM HG: CPT | Mod: CPTII,S$GLB,, | Performed by: PSYCHIATRY & NEUROLOGY

## 2025-05-20 PROCEDURE — 95886 MUSC TEST DONE W/N TEST COMP: CPT | Mod: S$GLB,,, | Performed by: PSYCHIATRY & NEUROLOGY

## 2025-05-20 PROCEDURE — 99417 PROLNG OP E/M EACH 15 MIN: CPT | Mod: S$GLB,,, | Performed by: PSYCHIATRY & NEUROLOGY

## 2025-05-20 PROCEDURE — G2211 COMPLEX E/M VISIT ADD ON: HCPCS | Mod: S$GLB,,, | Performed by: PSYCHIATRY & NEUROLOGY

## 2025-05-20 PROCEDURE — 95910 NRV CNDJ TEST 7-8 STUDIES: CPT | Mod: S$GLB,,, | Performed by: PSYCHIATRY & NEUROLOGY

## 2025-05-20 PROCEDURE — 3044F HG A1C LEVEL LT 7.0%: CPT | Mod: CPTII,S$GLB,, | Performed by: PSYCHIATRY & NEUROLOGY

## 2025-05-20 PROCEDURE — 99215 OFFICE O/P EST HI 40 MIN: CPT | Mod: S$GLB,,, | Performed by: PSYCHIATRY & NEUROLOGY

## 2025-05-20 PROCEDURE — 99499 UNLISTED E&M SERVICE: CPT | Mod: S$GLB,,, | Performed by: PSYCHIATRY & NEUROLOGY

## 2025-05-20 PROCEDURE — 3008F BODY MASS INDEX DOCD: CPT | Mod: CPTII,S$GLB,, | Performed by: PSYCHIATRY & NEUROLOGY

## 2025-05-20 PROCEDURE — 3078F DIAST BP <80 MM HG: CPT | Mod: CPTII,S$GLB,, | Performed by: PSYCHIATRY & NEUROLOGY
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
## 2025-05-21 ENCOUNTER — PATIENT MESSAGE (OUTPATIENT)
Facility: CLINIC | Age: 40
End: 2025-05-21
Payer: COMMERCIAL

## 2025-05-22 NOTE — PROGRESS NOTES
Chief Complaint: Well Woman Exam     HPI:      Hillary Hughes is a 39 y.o.  who presents for annual exam. She is currently complaining of a cyst on her labia that won't heal and intermittent burning with urination in the past month. Denies pain with urination.    Ms. Hughes is currently sexually active with a single male partner. She declines STD screening today. Patient does not have regular monthly menses. No LMP recorded. Patient has had an implant. She is currently using IUD for contraception.     Previous Pap: no abnormalities (2023)    Gardasil:unsure if received and not received yet after counseling     OB History    This patient's OB History needs to be verified. Open OB History to review and resolve any issues.      5    Para   4    Term   4       0    AB   1    Living   4         SAB   0    IAB   0    Ectopic   0    Multiple   0    Live Births   4                 GYN History  Age of Menarche:14  Age at first pregnancy:18   Age at first live birth:18   Comments: No abnormal pap or STDs     ROS:     GENERAL: Denies unintentional weight gain or weight loss. Feeling well overall.   SKIN: Denies rash or lesions.   HEENT: Denies headaches, or vision changes.   CARDIOVASCULAR: Denies palpitations or chest pain.   RESPIRATORY: Denies shortness of breath or dyspnea on exertion.  BREASTS: Denies pain, lumps, or nipple discharge.   ABDOMEN: Denies abdominal pain, constipation, diarrhea, nausea, vomiting, or change in appetite.   URINARY: Denies frequency, dysuria, hematuria.  NEUROLOGIC: Denies syncope or weakness.   PSYCHIATRIC: Denies depression, anxiety or mood swings.    Physical Exam:      PHYSICAL EXAM:  /70   Wt 68.3 kg (150 lb 10.3 oz)   BMI 27.55 kg/m²   Body mass index is 27.55 kg/m².     APPEARANCE: Well nourished, well developed, in no acute distress.  PSYCH: Appropriate mood and affect.  SKIN: No acne or hirsutism  NECK: Neck symmetric without masses or  thyromegaly  NODES: No inguinal, axillary, or supraclavicular lymph node enlargement  CHEST: Normal respiratory effort.  ABDOMEN: Soft.  No tenderness or masses.   BREASTS: Symmetrical, no skin changes or visible lesions.  No palpable masses or nipple discharge bilaterally.  PELVIC: Normal external genitalia. 2-3 mm folliculitis on right labia minora  in middle with scarring and trapped hair. No erythema or abscess.  Normal hair distribution.  Adequate perineal body, normal urethral meatus.  Vagina moist and well rugated without lesions or discharge.  Cervix pink, without lesions, discharge or tenderness.  IUD string seen 3 cm outside of cervical os. No significant cystocele or rectocele.  Bimanual exam shows uterus to be normal size, regular, mobile and nontender.  Adnexa without masses or tenderness.    EXTREMITIES: No edema.        Results for orders placed or performed in visit on 04/04/25   POCT URINE DIPSTICK WITHOUT MICROSCOPE    Collection Time: 04/04/25  2:20 PM   Result Value Ref Range    Glucose, UA NORMAL     Bilirubin, POC NEGATIVE     Ketones, UA NEGATIVE     Spec Grav UA 1.005     Blood, UA TRACE     pH, UA 5     Protein, POC TRACE     Urobilinogen, UA NORMAL     Nitrite, UA NEGATIVE     WBC, UA NEGATIVE     Color, UA Light Yellow     Clarity, UA Clear        Assessment/Plan:     Encounter for annual routine gynecological examination    Burning with urination  -     POCT URINE DIPSTICK WITHOUT MICROSCOPE: negative  -     C. trachomatis/N. gonorrhoeae by AMP DNA [facility]; Cervicovaginal    Surveillance of intrauterine contraception  -     Device Authorization Order: Kyleena due for removal/replacement by 4/2026.    Lesion of labia  -     Specimen to Pathology Gynecology and Obstetrics: see procedure report        RTC 1 year    Counseling:     Patient was counseled today on current ASCCP pap guidelines, the recommendation for yearly pelvic exams, healthy diet and exercise routines, breast self awareness.  She is to see her PCP for other health maintenance.       Use of the [employer] Patient Portal discussed and encouraged during today's visit.       Nancy Silva MD    As of April 1, 2021, the Cures Act has been passed nationally. This new law requires that all doctors progress notes, lab results, pathology reports and radiology reports be released IMMEDIATELY to the patient in the patient portal. That means that the results are released to you at the EXACT same time they are released to me. Therefore, with all of the patients that I have I am not able to reply to each patient exactly when the results come in. So there will be a delay from when you see the results to when I see them and have time to come up with a response to send you. Also I only see these results when I am on the computer at work. So if the results come in over the weekend or after 5 pm of a work day, I will not see them until the next business day. As you can tell, this is a challenge as a physician to give every patient the quick response they hope for and deserve. So please be patient! Thanks for understanding, Dr. Silva

## 2025-05-22 NOTE — PROCEDURES
Biopsy (Gynecological)    Date/Time: 4/4/2025 2:15 PM    Performed by: Nancy Silva MD  Authorized by: Nancy Silva MD    Consent obtained:  Prior to procedure the appropriate consent was completed and verified   Patient was prepped and draped in the normal sterile fashion.  Local anesthesia used?: Yes    Anesthesia:  Local infiltration  Local anesthetic:  Lidocaine 1% without epinephrine  Anesthetic total (ml):  2    Biopsy Location:  Vulva  Vulva:     # of lesions:  1  Estimated blood loss (cc):  10   Patient tolerated the procedure well with no immediate complications.

## 2025-05-23 NOTE — PROGRESS NOTES
Subjective:       Patient ID: Hillary Hughes is a 39 y.o. female.    Chief Complaint:  Bleeding/Bruising (Bleeding at biopsy site )      History of Present Illness  38 yo  here today for bleeding of vulva at site of vulvar biopsy done last week. Reported having some minimal bleeding initially then increased with a dark clot that came out today which concerned her. Not painful. Minimal itching.  OB History    This patient's OB History needs to be verified. Open OB History to review and resolve any issues.      5    Para   4    Term   4       0    AB   1    Living   4         SAB   0    IAB   0    Ectopic   0    Multiple   0    Live Births   4                 GYN History  Age of Menarche:14  Age at first pregnancy:18   Age at first live birth:18  Number of months breastfeeding:    Age at Menopause:    Comments: No abnormal pap or STDs     Past Medical History:   Diagnosis Date    Concussion     H/O concussion     History of urinary calculi     Kidney infection     kidney stones aand kidney infection 2012/and 15    Kidney stones     Thoracic outlet syndrome        Past Surgical History:   Procedure Laterality Date    BREAST AUGMENTATION       SECTION  2004    Boy     SECTION  2010    Boy     SECTION  2015    Boy     SECTION  2018    Girl    CHOLECYSTECTOMY  2012    CYSTOSCOPY W/ RETROGRADES Bilateral 2019    Procedure: CYSTOSCOPY, WITH RETROGRADE PYELOGRAM;  Surgeon: Vipul Sharma MD;  Location: [address];  Service: Urology;  Laterality: Bilateral;    DILATION AND CURETTAGE OF UTERUS      TAB    EPIDURAL STEROID INJECTION N/A 5/3/2022    Procedure: INJECTION, STEROID, EPIDURAL, C7-T1;  Surgeon: Sharon Gale MD;  Location: [facility] [place] [facility];  Service: Pain Management;  Laterality: N/A;    EPIDURAL STEROID INJECTION N/A 2024    Procedure: CERVICAL C7/T1 IL LEIGH;  Surgeon: Sharon Gale MD;  Location: [facility] [place] [facility]  [facility];  Service: Pain Management;  Laterality: N/A;  [phone]  2 WK F/U JOSE    EPIDURAL STEROID INJECTION N/A 11/19/2024    Procedure: CERVICAL C7/T1 LEIGH (TO THE RIGHT);  Surgeon: Sharon Gale MD;  Location: [facility]IN [facility]GT;  Service: Pain Management;  Laterality: N/A;  [phone]  2 WK F/U MINESH    URETEROSCOPY Right 6/7/2019    Procedure: URETEROSCOPY;  Surgeon: Vipul Sharma MD;  Location: [facility] OR [address] [place] [address];  Service: Urology;  Laterality: Right;  1hr        Family History   Problem Relation Name Age of Onset    Diabetes Paternal Grandfather      Dementia Paternal Grandmother      Kidney cancer Maternal Grandmother      Diabetes Maternal Grandfather      Diabetes Father      Hypertension Father      Anemia Mother      No Known Problems Sister      No Known Problems Sister      Breast cancer Neg Hx      Colon cancer Neg Hx      Ovarian cancer Neg Hx      Stroke Neg Hx      Cancer Neg Hx      Amblyopia Neg Hx      Blindness Neg Hx      Cataracts Neg Hx      Glaucoma Neg Hx      Macular degeneration Neg Hx      Retinal detachment Neg Hx      Strabismus Neg Hx          Social History[1]     Review of Systems  Review of Systems   Constitutional:  Negative for chills, fever and unexpected weight change.   Respiratory:  Negative for chest tightness and shortness of breath.    Cardiovascular:  Negative for chest pain.   Gastrointestinal:  Positive for diarrhea and nausea. Negative for abdominal distention, abdominal pain, constipation and vomiting.   Endocrine: Negative for cold intolerance and heat intolerance.   Genitourinary:  Positive for genital sores (bleeding from biopsy site). Negative for dyspareunia, frequency, pelvic pain, urgency and vaginal discharge.   Musculoskeletal:  Positive for back pain.   Skin:  Negative for rash.   Hematological:  Does not bruise/bleed easily.        Objective:     Vitals:    04/09/25 1613   Weight: 68.9 kg (151 lb 14.4 oz)       Physical Exam:   Constitutional: She is  oriented to person, place, and time. She appears well-developed and well-nourished. No distress.               Genitourinary:                     Neurological: She is alert and oriented to person, place, and time.          Assessment/ Plan:          Hillary was seen today for bleeding/bruising.    Diagnoses and all orders for this visit:    Vulvar bleeding  Site healing well. Entrapped old clot removed and site healing well with only minimal bloody discharge. Encourage to continue Mupirocin and will call at end of week to make sure no further concerns.    RTC as needed      Nancy Silva MD      As of April 1, 2021, the Cures Act has been passed nationally. This new law requires that all doctors progress notes, lab results, pathology reports and radiology reports be released IMMEDIATELY to the patient in the patient portal. That means that the results are released to you at the EXACT same time they are released to me. Therefore, with all of the patients that I have I am not able to reply to each patient exactly when the results come in. So there will be a delay from when you see the results to when I see them and have time to come up with a response to send you. Also I only see these results when I am on the computer at work. So if the results come in over the weekend or after 5 pm of a work day, I will not see them until the next business day. As you can tell, this is a challenge as a physician to give every patient the quick response they hope for and deserve. So please be patient!   Thanks for your understanding and patience.    Answers submitted by the patient for this visit:  Gynecologic Exam Questionnaire  (Submitted on 4/9/2025)  Chief Complaint: Gynecologic exam  Onset: today  Progression since onset: waxing and waning  Affected side: right  Pregnant now?: No  anorexia: Yes  discolored urine: No  painful intercourse: No  Passing clots?: No  Passing tissue?: Yes  Aggravated by: tactile  pressure  treatments tried: nothing  Improvement on treatment: mild  Sexual activity: not sexually active  Partner with STD symptoms: no  Birth control: an IUD  STD: No  abdominal surgery: No   section: Yes  Ectopic pregnancy: Yes  Endometriosis: No  herpes simplex: No  gynecological surgery: No  menorrhagia: No  metrorrhagia: No  miscarriage: No  ovarian cysts: Yes  perineal abscess: No  PID: No  terminated pregnancy: No  vaginosis: No         [1]   Social History  Socioeconomic History    Marital status:    Tobacco Use    Smoking status: Never    Smokeless tobacco: Never   Substance and Sexual Activity    Alcohol use: No    Drug use: No    Sexual activity: Yes     Partners: Male     Birth control/protection: None     Comment: :      Social Drivers of Health     Financial Resource Strain: Low Risk  (2025)    Overall Financial Resource Strain (CARDIA)     Difficulty of Paying Living Expenses: Not hard at all   Food Insecurity: No Food Insecurity (2025)    Hunger Vital Sign     Worried About Running Out of Food in the Last Year: Never true     Ran Out of Food in the Last Year: Never true   Transportation Needs: Patient Declined (2024)    PRAPARE - Transportation     Lack of Transportation (Medical): Patient declined     Lack of Transportation (Non-Medical): Patient declined   Physical Activity: Insufficiently Active (2025)    Exercise Vital Sign     Days of Exercise per Week: 3 days     Minutes of Exercise per Session: 30 min   Stress: Stress Concern Present (2025)    [employer] [employer] - Occupational Stress Questionnaire     Feeling of Stress : Very much   Housing Stability: Low Risk  (2024)    Housing Stability Vital Sign     Unable to Pay for Housing in the Last Year: No     Number of Places Lived in the Last Year: 1     Unstable Housing in the Last Year: No

## 2025-06-30 ENCOUNTER — OFFICE VISIT (OUTPATIENT)
Dept: PRIMARY CARE CLINIC | Facility: CLINIC | Age: 40
End: 2025-06-30
Payer: COMMERCIAL

## 2025-06-30 VITALS
SYSTOLIC BLOOD PRESSURE: 120 MMHG | BODY MASS INDEX: 28.39 KG/M2 | OXYGEN SATURATION: 99 % | WEIGHT: 154.31 LBS | DIASTOLIC BLOOD PRESSURE: 80 MMHG | RESPIRATION RATE: 18 BRPM | HEIGHT: 62 IN | HEART RATE: 82 BPM

## 2025-06-30 DIAGNOSIS — H66.93 BILATERAL OTITIS MEDIA, UNSPECIFIED OTITIS MEDIA TYPE: Primary | ICD-10-CM

## 2025-06-30 PROCEDURE — 1160F RVW MEDS BY RX/DR IN RCRD: CPT | Mod: CPTII,S$GLB,, | Performed by: INTERNAL MEDICINE

## 2025-06-30 PROCEDURE — 99999 PR PBB SHADOW E&M-EST. PATIENT-LVL IV: CPT | Mod: PBBFAC,,, | Performed by: INTERNAL MEDICINE

## 2025-06-30 PROCEDURE — 99214 OFFICE O/P EST MOD 30 MIN: CPT | Mod: S$GLB,,, | Performed by: INTERNAL MEDICINE

## 2025-06-30 PROCEDURE — 3079F DIAST BP 80-89 MM HG: CPT | Mod: CPTII,S$GLB,, | Performed by: INTERNAL MEDICINE

## 2025-06-30 PROCEDURE — 3074F SYST BP LT 130 MM HG: CPT | Mod: CPTII,S$GLB,, | Performed by: INTERNAL MEDICINE

## 2025-06-30 PROCEDURE — 3044F HG A1C LEVEL LT 7.0%: CPT | Mod: CPTII,S$GLB,, | Performed by: INTERNAL MEDICINE

## 2025-06-30 PROCEDURE — 1159F MED LIST DOCD IN RCRD: CPT | Mod: CPTII,S$GLB,, | Performed by: INTERNAL MEDICINE

## 2025-06-30 PROCEDURE — 3008F BODY MASS INDEX DOCD: CPT | Mod: CPTII,S$GLB,, | Performed by: INTERNAL MEDICINE

## 2025-06-30 RX ORDER — AMOXICILLIN AND CLAVULANATE POTASSIUM 875; 125 MG/1; MG/1
1 TABLET, FILM COATED ORAL EVERY 12 HOURS
Qty: 14 TABLET | Refills: 0 | Status: SHIPPED | OUTPATIENT
Start: 2025-06-30

## 2025-06-30 NOTE — PROGRESS NOTES
[facility] Note    Chief Complaint      Chief Complaint   Patient presents with    Tinnitus    Nasal Congestion    Generalized Body Aches    Insect Bite       History of Present Illness      Hillary Hughes is a 39 y.o. female who presents today for   Chief Complaint   Patient presents with    Tinnitus    Nasal Congestion    Generalized Body Aches    Insect Bite   .  Patient comes to appointment here for acute visit related  to above . She states she has zulma in [facility] the last few weeks . She states in the last 2 days wihth frequent flight changes has notye sinus pressure , subjective fever , cough and ear pain . Yesterday fever to 101.4     Problem List Items Addressed This Visit       Bilateral otitis media - Primary    Overview   Augmentin 875 mg po bid disp 14               Past Medical History:  Past Medical History:   Diagnosis Date    Concussion     H/O concussion     History of urinary calculi     Kidney infection     kidney stones aand kidney infection 2012/and 13    Kidney stones     Thoracic outlet syndrome        Past Surgical History:  Past Surgical History:   Procedure Laterality Date    BREAST AUGMENTATION       SECTION  2004    Boy     SECTION  2010    Boy     SECTION  2015    Boy     SECTION  2018    Girl    CHOLECYSTECTOMY  2012    COSMETIC SURGERY  2011    Breast Augmentation    CYSTOSCOPY W/ RETROGRADES Bilateral 2019    Procedure: CYSTOSCOPY, WITH RETROGRADE PYELOGRAM;  Surgeon: Vipul Sharma MD;  Location: [address];  Service: Urology;  Laterality: Bilateral;    DILATION AND CURETTAGE OF UTERUS      TAB    EPIDURAL STEROID INJECTION N/A 2022    Procedure: INJECTION, STEROID, EPIDURAL, C7-T1;  Surgeon: Sharon Gale MD;  Location: [facility];  Service: Pain Management;  Laterality: N/A;    EPIDURAL STEROID INJECTION N/A 2024    Procedure: CERVICAL C7/T1 IL  LEIGH;  Surgeon: Sharon Gale MD;  Location: [facility];  Service: Pain Management;  Laterality: N/A;  [phone]  2 WK F/U JOSE    EPIDURAL STEROID INJECTION N/A 11/19/2024    Procedure: CERVICAL C7/T1 LEIGH (TO THE RIGHT);  Surgeon: Sharon Glae MD;  [facility];  Service: Pain Management;  Laterality: N/A;  [phone]  2 WK F/U MINESH    URETEROSCOPY Right 06/07/2019    Procedure: URETEROSCOPY;  Surgeon: Vipul Sharma MD;  Location: [facility] OR [address] [place] [address];  Service: Urology;  Laterality: Right;  1hr       Family History:  family history includes Anemia in her mother; Dementia in her paternal grandmother; Diabetes in her father, maternal grandfather, and paternal grandfather; Hypertension in her father; Kidney cancer in her maternal grandmother; No Known Problems in her sister and sister.    Social History:  Social History[1]    Review of Systems:   Review of Systems   Constitutional:  Positive for fever. Negative for weight loss.   HENT:  Positive for ear pain and sinus pain. Negative for congestion, hearing loss and sore throat.    Eyes:  Negative for blurred vision.   Respiratory:  Negative for cough and shortness of breath.    Cardiovascular:  Negative for chest pain, palpitations, claudication and leg swelling.   Gastrointestinal:  Negative for abdominal pain, constipation, diarrhea and heartburn.   Genitourinary:  Negative for dysuria.   Musculoskeletal:  Negative for back pain and myalgias.   Skin:  Negative for rash.   Neurological:  Negative for focal weakness and headaches.   Psychiatric/Behavioral:  Negative for depression, memory loss and suicidal ideas. The patient is not nervous/anxious.          Medications:  Encounter Medications[2]     Allergies:  Review of patient's allergies indicates:   Allergen Reactions    Lactose     Ciprofloxacin Nausea Only and Other (See Comments)     Patient has joint pain.         Physical Exam         Vitals:    06/30/25 1040   BP: 120/80  "  Pulse: 82   Resp: 18         Physical Exam  Constitutional:       Appearance: She is well-developed.   HENT:      Right Ear: Tympanic membrane is erythematous.      Left Ear: Tympanic membrane is erythematous.   Eyes:      Pupils: Pupils are equal, round, and reactive to light.   Neck:      Thyroid: No thyromegaly.   Cardiovascular:      Rate and Rhythm: Normal rate.      Heart sounds: Normal heart sounds. No murmur heard.     No friction rub. No gallop.   Pulmonary:      Breath sounds: Normal breath sounds.   Abdominal:      General: Bowel sounds are normal.      Palpations: Abdomen is soft.   Musculoskeletal:         General: Normal range of motion.      Cervical back: Normal range of motion.   Lymphadenopathy:      Cervical: No cervical adenopathy.   Skin:     General: Skin is warm.      Findings: No rash.   Neurological:      Mental Status: She is alert and oriented to person, place, and time.      Cranial Nerves: No cranial nerve deficit.   Psychiatric:         Behavior: Behavior normal.          Laboratory:  CBC:  No results for input(s): "WBC", "RBC", "HGB", "HCT", "PLT", "MCV", "MCH", "MCHC" in the last 2160 hours.  CMP:  No results for input(s): "GLU", "CALCIUM", "ALBUMIN", "PROT", "NA", "K", "CO2", "CL", "BUN", "ALKPHOS", "ALT", "AST", "BILITOT" in the last 2160 hours.    Invalid input(s): "CREATININ"  URINALYSIS:  Recent Labs   Lab Result Units 04/04/25  1420   Color, UA  Light Yellow   Clarity, UA  Clear   Spec Grav UA  1.005   pH, UA  5   Nitrite, UA  NEGATIVE   Urobilinogen, UA  NORMAL      LIPIDS:  No results for input(s): "TSH", "HDL", "CHOL", "TRIG", "LDLCALC", "CHOLHDL", "NONHDLCHOL", "TOTALCHOLEST" in the last 2160 hours.  TSH:  No results for input(s): "TSH" in the last 2160 hours.  A1C:  No results for input(s): "HGBA1C" in the last 2160 hours.    Radiology:        Assessment:     Hillary Hughes is a 39 y.o.female with:    Bilateral otitis media, unspecified otitis media type  -     " amoxicillin-clavulanate 875-125mg (AUGMENTIN) 875-125 mg per tablet; Take 1 tablet by mouth every 12 (twelve) hours.  Dispense: 14 tablet; Refill: 0          Plan:     Problem List Items Addressed This Visit       Bilateral otitis media - Primary    Overview   Augmentin 875 mg po bid disp 14             As above, continue current medications and maintain follow up with specialists.  Return to clinic as scheduled       [facility]er[facility] Dantagnan [facility]                       [1]   Social History  Socioeconomic History    Marital status:    Tobacco Use    Smoking status: Never    Smokeless tobacco: Never   Substance and Sexual Activity    Alcohol use: No    Drug use: No    Sexual activity: Not Currently     Partners: Male     Birth control/protection: I.U.D.     Comment: :      Social Drivers of Health     Financial Resource Strain: Low Risk  (1/29/2025)    Overall Financial Resource Strain (CARDIA)     Difficulty of Paying Living Expenses: Not hard at all   Food Insecurity: No Food Insecurity (1/29/2025)    Hunger Vital Sign     Worried About Running Out of Food in the Last Year: Never true     Ran Out of Food in the Last Year: Never true   Transportation Needs: Patient Declined (1/26/2024)    PRAPARE - Transportation     Lack of Transportation (Medical): Patient declined     Lack of Transportation (Non-Medical): Patient declined   Physical Activity: Insufficiently Active (1/29/2025)    Exercise Vital Sign     Days of Exercise per Week: 3 days     Minutes of Exercise per Session: 30 min   Stress: Stress Concern Present (1/29/2025)    [employer] [place] [employer] - Occupational Stress Questionnaire     Feeling of Stress : Very much   Housing Stability: Low Risk  (1/26/2024)    Housing Stability Vital Sign     Unable to Pay for Housing in the Last Year: No     Number of Places Lived in the Last Year: 1     Unstable Housing in the Last Year: No   [2]    Outpatient Encounter Medications as of 6/30/2025   Medication Sig Dispense Refill    ibuprofen (ADVIL,MOTRIN) 800 MG tablet Take 1 tablet (800 mg total) by mouth 3 (three) times daily as needed for Pain. 90 tablet 3    LIDOcaine (LIDODERM) 5 % Place 1 patch onto the skin once daily. Remove & Discard patch within 12 hours or as directed by MD 30 patch 6    methocarbamoL (ROBAXIN) 500 MG Tab Take 1 tablet (500 mg total) by mouth 2 (two) times daily as needed (for muscle spasms). 60 tablet 1    ondansetron (ZOFRAN-ODT) 4 MG TbDL Take 1 tablet (4 mg total) by mouth every 8 (eight) hours as needed. 30 tablet 0    pregabalin (LYRICA) 25 MG capsule Take 1 capsule (25 mg total) by mouth once daily. 30 capsule 1    pregabalin (LYRICA) 50 MG capsule Take 1 capsule (50 mg total) by mouth 2 (two) times daily. 60 capsule 1    albuterol (PROVENTIL/VENTOLIN HFA) 90 mcg/actuation inhaler Inhale 2 puffs into the lungs every 6 (six) hours as needed for Wheezing. Rescue 18 g 11    amoxicillin-clavulanate 875-125mg (AUGMENTIN) 875-125 mg per tablet Take 1 tablet by mouth every 12 (twelve) hours. 14 tablet 0     Facility-Administered Encounter Medications as of 6/30/2025   Medication Dose Route Frequency Provider Last Rate Last Admin    0.9%  NaCl infusion   Intravenous Continuous Orlando Medel MD

## 2025-06-30 NOTE — LETTER
June 30, 2025      [facility]  [address]  [place] [address]  Phone: [phone]       Patient: Hillary Hughes   YOB: 1985  Date of Visit: 06/30/2025    To Whom It May Concern:    Devora Hughes  was at [facility] on 06/30/2025. The patient may return to work/school on 07/02/2025  with no restrictions. If you have any questions or concerns, or if I can be of further assistance, please do not hesitate to contact me.    Sincerely,    Jonathan Day MD
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
## 2025-07-03 ENCOUNTER — TELEPHONE (OUTPATIENT)
Dept: BARIATRICS | Facility: CLINIC | Age: 40
End: 2025-07-03
Payer: COMMERCIAL

## 2025-07-03 ENCOUNTER — OFFICE VISIT (OUTPATIENT)
Dept: PHYSICAL MEDICINE AND REHAB | Facility: CLINIC | Age: 40
End: 2025-07-03
Payer: COMMERCIAL

## 2025-07-03 VITALS
HEIGHT: 62 IN | HEART RATE: 80 BPM | DIASTOLIC BLOOD PRESSURE: 70 MMHG | BODY MASS INDEX: 27.99 KG/M2 | SYSTOLIC BLOOD PRESSURE: 111 MMHG | WEIGHT: 152.13 LBS

## 2025-07-03 DIAGNOSIS — M54.2 NECK PAIN ON RIGHT SIDE: ICD-10-CM

## 2025-07-03 DIAGNOSIS — M79.601 RIGHT ARM PAIN: Primary | ICD-10-CM

## 2025-07-03 DIAGNOSIS — G56.41 COMPLEX REGIONAL PAIN SYNDROME TYPE 2 OF RIGHT UPPER EXTREMITY: ICD-10-CM

## 2025-07-03 PROCEDURE — 1159F MED LIST DOCD IN RCRD: CPT | Mod: CPTII,S$GLB,, | Performed by: STUDENT IN AN ORGANIZED HEALTH CARE EDUCATION/TRAINING PROGRAM

## 2025-07-03 PROCEDURE — 99999 PR PBB SHADOW E&M-EST. PATIENT-LVL III: CPT | Mod: PBBFAC,,, | Performed by: STUDENT IN AN ORGANIZED HEALTH CARE EDUCATION/TRAINING PROGRAM

## 2025-07-03 PROCEDURE — 3008F BODY MASS INDEX DOCD: CPT | Mod: CPTII,S$GLB,, | Performed by: STUDENT IN AN ORGANIZED HEALTH CARE EDUCATION/TRAINING PROGRAM

## 2025-07-03 PROCEDURE — 3044F HG A1C LEVEL LT 7.0%: CPT | Mod: CPTII,S$GLB,, | Performed by: STUDENT IN AN ORGANIZED HEALTH CARE EDUCATION/TRAINING PROGRAM

## 2025-07-03 PROCEDURE — 3074F SYST BP LT 130 MM HG: CPT | Mod: CPTII,S$GLB,, | Performed by: STUDENT IN AN ORGANIZED HEALTH CARE EDUCATION/TRAINING PROGRAM

## 2025-07-03 PROCEDURE — 99204 OFFICE O/P NEW MOD 45 MIN: CPT | Mod: S$GLB,,, | Performed by: STUDENT IN AN ORGANIZED HEALTH CARE EDUCATION/TRAINING PROGRAM

## 2025-07-03 PROCEDURE — 3078F DIAST BP <80 MM HG: CPT | Mod: CPTII,S$GLB,, | Performed by: STUDENT IN AN ORGANIZED HEALTH CARE EDUCATION/TRAINING PROGRAM

## 2025-07-03 RX ORDER — LIDOCAINE AND PRILOCAINE 25; 25 MG/G; MG/G
CREAM TOPICAL
Qty: 30 G | Refills: 0 | Status: SHIPPED | OUTPATIENT
Start: 2025-07-03

## 2025-07-03 RX ORDER — CYCLOBENZAPRINE HCL 5 MG
5 TABLET ORAL NIGHTLY
Qty: 30 TABLET | Refills: 2 | Status: SHIPPED | OUTPATIENT
Start: 2025-07-03 | End: 2025-10-01

## 2025-07-03 NOTE — TELEPHONE ENCOUNTER
Spoke with the pt in regard to her interest in scheduling a medical consult. After reviewing the pt's chart/BMI, I advised her that her BMI was too low to receive medications from the providers. Pt verbalized understanding and the call was disconnected.

## 2025-08-07 ENCOUNTER — TELEPHONE (OUTPATIENT)
Dept: PAIN MEDICINE | Facility: CLINIC | Age: 40
End: 2025-08-07
Payer: COMMERCIAL

## 2025-08-07 DIAGNOSIS — M54.12 CERVICAL RADICULOPATHY: ICD-10-CM

## 2025-08-07 RX ORDER — PREGABALIN 50 MG/1
50 CAPSULE ORAL 2 TIMES DAILY
Qty: 60 CAPSULE | Refills: 2 | Status: SHIPPED | OUTPATIENT
Start: 2025-08-07

## (undated) DEVICE — DRESSING LEUKOPLAST FLEX 1X3IN

## (undated) DEVICE — SYR ONLY LUER LOCK 20CC

## (undated) DEVICE — SOL IRR WATER STRL 3000 ML

## (undated) DEVICE — GUIDE WIRE MOTION .035 X 150CM

## (undated) DEVICE — PACK CYSTO

## (undated) DEVICE — EXTRACTOR TIPLESS 2.4FRX1115CM

## (undated) DEVICE — TRAY CYSTO BASIN

## (undated) DEVICE — CATH 5FR OPEN END URETERAL

## (undated) DEVICE — SET IRR URLGY 2LINE UNIV SPIKE

## (undated) DEVICE — SYR 10CC LUER LOCK

## (undated) DEVICE — SOL IRR NACL .9% 3000ML

## (undated) DEVICE — GOWN X-LG STERILE BACK

## (undated) DEVICE — FIBER MOSES 200 DFL